# Patient Record
Sex: MALE | Race: WHITE | NOT HISPANIC OR LATINO | Employment: UNEMPLOYED | URBAN - NONMETROPOLITAN AREA
[De-identification: names, ages, dates, MRNs, and addresses within clinical notes are randomized per-mention and may not be internally consistent; named-entity substitution may affect disease eponyms.]

---

## 2019-10-23 ENCOUNTER — TRANSCRIBE ORDERS (OUTPATIENT)
Dept: ADMINISTRATIVE | Facility: HOSPITAL | Age: 34
End: 2019-10-23

## 2021-07-19 ENCOUNTER — APPOINTMENT (OUTPATIENT)
Dept: GENERAL RADIOLOGY | Facility: HOSPITAL | Age: 36
End: 2021-07-19

## 2021-07-19 ENCOUNTER — HOSPITAL ENCOUNTER (EMERGENCY)
Facility: HOSPITAL | Age: 36
Discharge: HOME | End: 2021-07-19
Attending: EMERGENCY MEDICINE | Admitting: EMERGENCY MEDICINE

## 2021-07-19 VITALS
TEMPERATURE: 99.2 F | HEART RATE: 89 BPM | WEIGHT: 165 LBS | RESPIRATION RATE: 18 BRPM | HEIGHT: 72 IN | DIASTOLIC BLOOD PRESSURE: 80 MMHG | OXYGEN SATURATION: 99 % | BODY MASS INDEX: 22.35 KG/M2 | SYSTOLIC BLOOD PRESSURE: 119 MMHG

## 2021-07-19 DIAGNOSIS — S81.011A KNEE LACERATION, RIGHT, INITIAL ENCOUNTER: ICD-10-CM

## 2021-07-19 DIAGNOSIS — S99.911A INJURY OF RIGHT ANKLE, INITIAL ENCOUNTER: ICD-10-CM

## 2021-07-19 DIAGNOSIS — S89.91XA INJURY OF RIGHT KNEE, INITIAL ENCOUNTER: Primary | ICD-10-CM

## 2021-07-19 DIAGNOSIS — S79.911A INJURY OF RIGHT HIP, INITIAL ENCOUNTER: ICD-10-CM

## 2021-07-19 PROCEDURE — 25010000002 FENTANYL CITRATE (PF) 50 MCG/ML SOLUTION: Performed by: EMERGENCY MEDICINE

## 2021-07-19 PROCEDURE — 73562 X-RAY EXAM OF KNEE 3: CPT

## 2021-07-19 PROCEDURE — 25010000002 MORPHINE PER 10 MG: Performed by: EMERGENCY MEDICINE

## 2021-07-19 PROCEDURE — 99284 EMERGENCY DEPT VISIT MOD MDM: CPT

## 2021-07-19 PROCEDURE — 99283 EMERGENCY DEPT VISIT LOW MDM: CPT

## 2021-07-19 PROCEDURE — 96374 THER/PROPH/DIAG INJ IV PUSH: CPT

## 2021-07-19 PROCEDURE — 25010000003 LIDOCAINE 1 % SOLUTION: Performed by: EMERGENCY MEDICINE

## 2021-07-19 PROCEDURE — 73610 X-RAY EXAM OF ANKLE: CPT

## 2021-07-19 PROCEDURE — 96375 TX/PRO/DX INJ NEW DRUG ADDON: CPT

## 2021-07-19 PROCEDURE — 73502 X-RAY EXAM HIP UNI 2-3 VIEWS: CPT

## 2021-07-19 RX ORDER — FENTANYL CITRATE 50 UG/ML
75 INJECTION, SOLUTION INTRAMUSCULAR; INTRAVENOUS
Status: DISCONTINUED | OUTPATIENT
Start: 2021-07-19 | End: 2021-07-19 | Stop reason: HOSPADM

## 2021-07-19 RX ORDER — OXYCODONE HYDROCHLORIDE AND ACETAMINOPHEN 5; 325 MG/1; MG/1
1 TABLET ORAL EVERY 4 HOURS PRN
Qty: 12 TABLET | Refills: 0 | Status: SHIPPED | OUTPATIENT
Start: 2021-07-19

## 2021-07-19 RX ORDER — IBUPROFEN 200 MG
1 TABLET ORAL ONCE
Status: COMPLETED | OUTPATIENT
Start: 2021-07-19 | End: 2021-07-19

## 2021-07-19 RX ORDER — OXYCODONE HYDROCHLORIDE AND ACETAMINOPHEN 5; 325 MG/1; MG/1
1 TABLET ORAL ONCE
Status: COMPLETED | OUTPATIENT
Start: 2021-07-19 | End: 2021-07-19

## 2021-07-19 RX ORDER — LIDOCAINE HYDROCHLORIDE 10 MG/ML
10 INJECTION, SOLUTION INFILTRATION; PERINEURAL ONCE
Status: COMPLETED | OUTPATIENT
Start: 2021-07-19 | End: 2021-07-19

## 2021-07-19 RX ORDER — MORPHINE SULFATE 4 MG/ML
4 INJECTION, SOLUTION INTRAMUSCULAR; INTRAVENOUS ONCE
Status: COMPLETED | OUTPATIENT
Start: 2021-07-19 | End: 2021-07-19

## 2021-07-19 RX ADMIN — OXYCODONE HYDROCHLORIDE AND ACETAMINOPHEN 1 TABLET: 5; 325 TABLET ORAL at 17:39

## 2021-07-19 RX ADMIN — MORPHINE SULFATE 4 MG: 4 INJECTION, SOLUTION INTRAMUSCULAR; INTRAVENOUS at 15:34

## 2021-07-19 RX ADMIN — FENTANYL CITRATE 75 MCG: 50 INJECTION, SOLUTION INTRAMUSCULAR; INTRAVENOUS at 16:23

## 2021-07-19 RX ADMIN — Medication 1 APPLICATION: at 17:40

## 2021-07-19 RX ADMIN — LIDOCAINE HYDROCHLORIDE 10 ML: 10 INJECTION, SOLUTION INFILTRATION; PERINEURAL at 17:00

## 2021-07-19 NOTE — DISCHARGE INSTRUCTIONS
Sutures out in 12 to 14 days.  This can be done here in the ER or at orthopedics or family doctor's office.
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
## 2023-05-01 ENCOUNTER — OFFICE VISIT (OUTPATIENT)
Dept: PSYCHIATRY | Facility: CLINIC | Age: 38
End: 2023-05-01
Payer: MEDICAID

## 2023-05-01 DIAGNOSIS — F11.20 OPIOID USE DISORDER, SEVERE, DEPENDENCE: Primary | ICD-10-CM

## 2023-05-01 NOTE — PROGRESS NOTES
IOP Initial Assessment   Assessment completed on 5/1/2023.  Date: 05/01/2023  Name: Taye Ahmadi    PCP: Unknown to clinician.   Referred by: Self.     Identifying Information:  Assessment completed on 5/1/2023.     Taye Ahmadi, is a 37 y.o. male presents for an initial IOP assessment with Ta Ambrose LCSW at [facility].     Patient reports that he currently lives in [facility]. Patient reports that he lives with his wife and his 4 children (ages 11, 9, 7, and 3). Patient reports that he just started a new job at [employer] which will be full time. Patient reports that he has license. Patient reports that he has transportation. Patient reports motivation for treatment is “get better for myself and kids”. Patient reports that his sober supports are his wife, mother, and father. Patient is seeking IOP treatment on his own accord.     History Present Illness, Onset, Duration, Course:   Patient during assessment discussed substance use history. Patient reports history of nicotine use. Patient reports age at first use was age 19. Patient reports he smokes 0.5 packs per day. Patient reports last nicotine use was today.     Patient reports history of alcohol use. Patient reports age at first use was age 19. Patient reports he would drink 1-2 times per month. Patient reports last alcohol use was last year.     Patient reports history of pain pill use. Patient reports he would use Percocet 10 mg. Patient reports age at first use was age 22. Patient reports he broke [employer] and was prescribed initially, but then began buying off the street. Patient reports he used daily for 15 years. Patient reports he would use 10-15 pills per day. Patient reports last pain pill use was March 17th.     Patient denied heroin, methamphetamine, methadone, cocaine, benzo, marijuana, or suboxone use.     Previous Psychiatric History:    History of prior treatment or hospitalization: Patient reports that he just  completed rehab program at [facility] on April 14th. Patient reports that this was his first rehab. Patient reports that he does not have a therapist. Patient reports he is not on any medications. Patient denied history of inpatient mental health hospitalizations.     History of use of psychotropics: Patient reports not on any medications currently.     History of suicide attempts: Patient during assessment denied current suicidal thoughts. Patient during assessment denied current plan or intent to hurt self. Patient during assessment denied current death wishes. Patient during assessment denied history of suicidal thoughts or plan or intent to hurt self. Patient during assessment denied history of suicide attempts or self-harm.     History of violence: Patient during assessment denied current or history of homicidal thoughts or plan or intent to hurt others. Patient during assessment denied history of violence.     Personal Assessment: 1-10 Scale (10 worst)    Anxiety:  2.    Depression:  1.      Suicidal Ideation: Patient during assessment denied current suicidal thoughts. Patient during assessment denied current plan or intent to hurt self. Patient during assessment denied current death wishes. Patient during assessment denied history of suicidal thoughts or plan or intent to hurt self. Patient during assessment denied history of suicide attempts or self-harm.     Patient during assessment denied current or history of homicidal thoughts or plan or intent to hurt others. Patient during assessment denied history of violence.     Patient during assessment denied history of hallucinations.       Family Psychiatric History:  Family history is significant for psychiatric issues: Patient denied.     Family history is significant for substance abuse issues:  Patient reports grandfather used alcohol and cousins used substances.     Life Events/Trauma History: Patient during assessment denied history of trauma or abuse.  "    Medical History:   I have reviewed this patient's past medical history.    Are there any significant health issues (current or past): In discussing medical history, patient reports no medical history other than having ADHD as a child.     History of seizures: Patient denied history of seizures.     No family history on file.    Current Medications:   Current Outpatient Medications   Medication Sig Dispense Refill   • nicotine (NICODERM CQ) 14 MG/24HR patch Place 1 patch on the skin as directed by provider Daily. 30 patch 2     No current facility-administered medications for this visit.       Relational History:  Difficulty getting along with peers: Patient reports \"not really\".   Difficulty making new friendships: Patient reports \"not really\".   Difficulty maintaining friendships: no  Close with family members: yes    Legal History:  Patient during assessment denied legal history.     Substance Abuse History: Patient during assessment discussed substance use history. Patient reports history of nicotine use. Patient reports age at first use was age 19. Patient reports he smokes 0.5 packs per day. Patient reports last nicotine use was today.     Patient reports history of alcohol use. Patient reports age at first use was age 19. Patient reports he would drink 1-2 times per month. Patient reports last alcohol use was last year.     Patient reports history of pain pill use. Patient reports he would use Percocet 10 mg. Patient reports age at first use was age 22. Patient reports he broke [employer] and was prescribed initially, but then began buying off the street. Patient reports he used daily for 15 years. Patient reports he would use 10-15 pills per day. Patient reports last pain pill use was March 17th.     Patient denied heroin, methamphetamine, methadone, cocaine, benzo, marijuana, or suboxone use.     History of DT's: Unknown to clinician.                       History of Seizures: Patient denied history of " seizures.     Withdrawal Symptoms: Patient reported historically having upset stomach and a little pain.       Cravings: Patient during assessment rated cravings as a 1 on a 1:10 scale (1-low).     Urine drug screen today was presumptively positive for: N/A.     Mental Status Exam:   Affect: Appropriate  Cooperation: Cooperative  Delusion: None  Eye Contact: Good  Hallucinations: None  Homicidal: None  Suicidal: None  Cognition: Good  Memory: Intact  Orientation: Person, Place, Time and Situation  Psychomotor Behaviors: Appropriate  Speech: Normal  Though Content: Normal  Thought Progress: Linear  Hopelessness: Patient during assessment rated feelings of hopelessness as a 1 on a 1:10 scale (1-low).   Reliability: fair  Insight: Good  Judgement: Fair  Impulse Control: Fair  Physical/Medical Issues: In discussing medical history, patient reports no medical history other than having ADHD as a child.     Patient resources/assets: Patient reported sober supports, has license, has transportation, and reported just starting a new job.     ASAM Dimensions:  I.    Intoxication/Withdrawal:  1  (Patient reported last substance use was on March 17th. Patient reports historic withdrawal symptoms included upset stomach and a little pain).  II.   Medical Conditions/Complications:  1 (Patient reports ADHD as a child but denied other medical conditions).  III.  Behavioral/Emotional/Cognitive: 1 (Patient during assessment reported anxiety was a 2, depression was a 1, and feelings of hopelessness as a 1 on a 1:10 scale (1-low). Patient reported having ADHD as a child).  IV.  Readiness to Change: 1 (Patient identified motivation for treatment).  V.   Relapse Risk: 2 (Due to patient reported substance use history).  VI:  Recovery Environment: 0 (Patient reported he has license, transportation, and identified sober supports).  Total ASAM Score = 06     Baseline Scores: 05/01/2023  CHECO-7   (03)                  PHQ-9   (02)        Impression/Formulation: Substance use disorder diagnostic criteria verbally reviewed with patient during assessment for opioid and alcohol use. Based on patient self-report during assessment, patient met 11 of 11 criteria for opioid use and 0 of 11 criteria for alcohol use. Therefore, diagnosis of opioid use disorder, severe, dependence listed.       DSM 5 Substance Use Disorder Checklist     Diagnostic Criteria   (Substance use disorder requires at least 2 criteria be met within 12 month period)   Meets Criteria          Yes / No  Notes/Supporting Information    1. Substance often taken in larger amounts or over a longer period than intended.  yes Opioid use.   2.  There is a persistent desire or unsuccessful effort to cut        down or control th substance use.  yes Opioid use.   3.  A great deal of time is spent in activities necessary to        obtain the substance, use the substance, or recover        from its effects.  yes Opioid use.   4.  Craving or a strong desire to use the substance.  yes Opioid use.   5.  Recurrent substance use resulting in failure to fulfill        major role obligations at work, school, or home.  yes Opioid use.   6.  Continued substance use despite having persistent or         recurrent social or interpersonal problems caused or         exacerbated by the effects of the substance.  yes Opioid use.   7.   Important social, occupational or recreational activities        are given up or reduced because of substance use.  yes Opioid use.   8.   Recurrent substance use in situations in which it is        physically hazardous.  yes Opioid use.   9.  Continued use despite knowledge of having a         persistent or recurrent physical or psychological         problem that is likely to have been caused or        exacerbated by the substance.  yes Opioid use.   10. * Tolerance, as defined by either of the following:          a. A need for markedly increased amounts of the               Substance to achieve intoxication or desired effect.          b. Markedly diminished effect with continued use of              The same substance.  yes Opioid use.   11.  * Withdrawal, as manifested by either of the following:         a. The characteristic withdrawal for the substance.          b. The same (or closely related) substance is taken to               Relieve or avoid withdrawal symptoms.  yes Opioid use.   **This criterion is not considered to be met for those individuals taking prescriptions opiates solely under medical supervision. **   Severity: Mild: 2-3 symptoms, Moderate: 4-5 symptoms, Severe: 6 or more symptoms.        VISIT DIAGNOSIS:     ICD-10-CM ICD-9-CM   1. Opioid use disorder, severe, dependence  F11.20 304.00        Patient appeared alert and oriented.     Plan:  Confidentiality and reporting requirements verbally explained during assessment and patient verbally agreed.     Safety plan of report to police or hospital, or call 911 if feeling unsafe, if having suicidal or homicidal thoughts, or if in emergency need of medications verbally reviewed with patient during assessment and suicide prevention hotline number verbally provided to patient during assessment, patient during assessment verbally agreed to safety plan. Patient signed safety plan which has been scanned into chart.     Patient agreed to CD-IOP start date of Wednesday 5/3/2023.    Ta Ambrose LCSW  5/3/2023  08:15 EDT

## 2023-05-02 ENCOUNTER — OFFICE VISIT (OUTPATIENT)
Dept: PSYCHIATRY | Facility: CLINIC | Age: 38
End: 2023-05-02
Payer: MEDICAID

## 2023-05-02 ENCOUNTER — LAB (OUTPATIENT)
Dept: LAB | Facility: HOSPITAL | Age: 38
End: 2023-05-02
Payer: MEDICAID

## 2023-05-02 VITALS
WEIGHT: 169 LBS | DIASTOLIC BLOOD PRESSURE: 80 MMHG | HEIGHT: 72 IN | SYSTOLIC BLOOD PRESSURE: 122 MMHG | HEART RATE: 73 BPM | BODY MASS INDEX: 22.89 KG/M2

## 2023-05-02 DIAGNOSIS — F17.210 CIGARETTE NICOTINE DEPENDENCE WITHOUT COMPLICATION: ICD-10-CM

## 2023-05-02 DIAGNOSIS — F11.20 OPIOID USE DISORDER, SEVERE, DEPENDENCE: ICD-10-CM

## 2023-05-02 DIAGNOSIS — F11.20 OPIOID USE DISORDER, SEVERE, DEPENDENCE: Primary | ICD-10-CM

## 2023-05-02 PROBLEM — G89.4 CHRONIC PAIN DISORDER: Status: ACTIVE | Noted: 2021-03-26

## 2023-05-02 PROBLEM — M46.1 INFLAMMATION OF SACROILIAC JOINT: Status: ACTIVE | Noted: 2021-03-26

## 2023-05-02 PROBLEM — M54.16 LUMBAR RADICULOPATHY: Status: ACTIVE | Noted: 2021-03-26

## 2023-05-02 LAB
AMPHET+METHAMPHET UR QL: NEGATIVE
AMPHETAMINES UR QL: NEGATIVE
BARBITURATES UR QL SCN: NEGATIVE
BENZODIAZ UR QL SCN: NEGATIVE
BUPRENORPHINE SERPL-MCNC: NEGATIVE NG/ML
CANNABINOIDS SERPL QL: NEGATIVE
COCAINE UR QL: NEGATIVE
METHADONE UR QL SCN: NEGATIVE
OPIATES UR QL: NEGATIVE
OXYCODONE UR QL SCN: NEGATIVE
PCP UR QL SCN: NEGATIVE
PROPOXYPH UR QL: NEGATIVE
TRICYCLICS UR QL SCN: NEGATIVE

## 2023-05-02 PROCEDURE — 80306 DRUG TEST PRSMV INSTRMNT: CPT

## 2023-05-02 PROCEDURE — 90792 PSYCH DIAG EVAL W/MED SRVCS: CPT | Performed by: NURSE PRACTITIONER

## 2023-05-02 PROCEDURE — 1160F RVW MEDS BY RX/DR IN RCRD: CPT | Performed by: NURSE PRACTITIONER

## 2023-05-02 PROCEDURE — 1159F MED LIST DOCD IN RCRD: CPT | Performed by: NURSE PRACTITIONER

## 2023-05-02 RX ORDER — NALOXONE HYDROCHLORIDE 4 MG/.1ML
SPRAY NASAL
COMMUNITY
Start: 2023-03-20 | End: 2023-05-02

## 2023-05-02 RX ORDER — NICOTINE 21 MG/24HR
1 PATCH, TRANSDERMAL 24 HOURS TRANSDERMAL EVERY 24 HOURS
Qty: 30 PATCH | Refills: 2 | Status: SHIPPED | OUTPATIENT
Start: 2023-05-02

## 2023-05-02 NOTE — PROGRESS NOTES
New Patient Office Visit        Patient Name: Taye Ahmadi  : 1985   MRN: [id number]     Referring Provider: Merry Fuentes MD    Chief Complaint: Substance use    History of Present Illness:   Taye Ahmadi is a 37 y.o. male who is here today for initial evaluation with provider related to substance use. Initial substance at age 19 with alcohol and used about 1-2 times per month with last use about a year ago.  At age 22 use of opioid pain medication began. Started with rx for broken collarbone and began buying off the street when rx ended. Used multiple times daily for about 15 year with last use 3/17/2023. Denies cravings or recent triggering events. Previous [facility] treatment includes recent residential stay at [facility] and participated in [facility] Device study.  Continues to meet with them for weekly drug screens.  Completed program 2023.  Reviewed DSM-V criteria with patient and meets requirements for opioid use disorder, severe.     Currently lives in [facility] with with his wife and his 4 children (ages 11, 9, 7, and 3). Identifies sober support system of in-laws, wife, mother, and father. Currently employed full-time by [employer]. License is active and has a vehicle.  Denies legal issues related to use.  Has psych history of ADHD diagnosed as a child.  Feels he is doing well without any pharmacological intervention.  Has some situational anxiety due to daily stressors but feels he malik well overall.  Uses meditation often. Denies prior psychiatric hospitalizations. Denies SI/HI, AVH. +FH of [facility] in uncle.  Denies significant past medical history.  Recently tested for HIV and hep C and believes these test were negative. Currently has a PCP ([facility]).    Triggers: Denies recent triggering events    Cravings: Denies    Relapse Prevention: IOP, peer support, recovery meetings 2 to 3 days a week encouraged    Urine Drug Screen (today's visit) discussed: ordered    UDS  Confirmation: n/a    KRUPA (PDMP) Reviewed for Current/Active Medications: No active medications (KRUPA [id number])    Past Surgical History:  Past Surgical History:   Procedure Laterality Date   • APPENDECTOMY     • KNEE ARTHROSCOPY W/ ACL RECONSTRUCTION Right    • KNEE SURGERY Right        Problem List:  Patient Active Problem List   Diagnosis   • Lumbar radiculopathy   • Inflammation of sacroiliac joint   • Chronic pain disorder       Allergy:   Allergies   Allergen Reactions   • Ibuprofen GI Bleeding        Current Medications:   Current Outpatient Medications   Medication Sig Dispense Refill   • nicotine (NICODERM CQ) 14 MG/24HR patch Place 1 patch on the skin as directed by provider Daily. 30 patch 2     No current facility-administered medications for this visit.       Past Medical History:  History reviewed. No pertinent past medical history.    Social History:  Social History     Socioeconomic History   • Marital status:    Tobacco Use   • Smoking status: Every Day     Packs/day: 0.50     Types: Cigarettes   • Smokeless tobacco: Never   Vaping Use   • Vaping Use: Never used   Substance and Sexual Activity   • Alcohol use: Not Currently   • Drug use: Not Currently   • Sexual activity: Defer       Family History:  History reviewed. No pertinent family history.      Subjective      Review of Systems:   Review of Systems   Constitutional: Negative for chills, fatigue and fever.   Respiratory: Negative for shortness of breath.    Cardiovascular: Negative for chest pain.   Gastrointestinal: Negative for abdominal pain.   Skin: Negative for skin lesions.   Neurological: Negative for seizures and confusion.   Psychiatric/Behavioral: Positive for stress. Negative for hallucinations, sleep disturbance, suicidal ideas and depressed mood. The patient is not nervous/anxious.        PHQ-9 Depression Screening  Little interest or pleasure in doing things? 0-->not at all   Feeling down, depressed, or hopeless?  0-->not at all   Trouble falling or staying asleep, or sleeping too much? 0-->not at all   Feeling tired or having little energy? 0-->not at all   Poor appetite or overeating? 1-->several days   Feeling bad about yourself - or that you are a failure or have let yourself or your family down? 1-->several days   Trouble concentrating on things, such as reading the newspaper or watching television? 0-->not at all   Moving or speaking so slowly that other people could have noticed? Or the opposite - being so fidgety or restless that you have been moving around a lot more than usual? 0-->not at all   Thoughts that you would be better off dead, or of hurting yourself in some way? 0-->not at all   PHQ-9 Total Score 2   If you checked off any problems, how difficult have these problems made it for you to do your work, take care of things at home, or get along with other people? somewhat difficult       CHECO-7 Score:   Feeling nervous, anxious or on edge: Several days  Not being able to stop or control worrying: Not at all  Worrying too much about different things: Several days  Trouble Relaxing: Not at all  Being so restless that it is hard to sit still: Several days  Feeling afraid as if something awful might happen: Not at all  Becoming easily annoyed or irritable: Several days  CHECO 7 Total Score: 4  If you checked any problems, how difficult have these problems made it for you to do your work, take care of things at home, or get along with other people: Not difficult at all    Patient History:   The following portions of the patient's history were reviewed and updated as appropriate: allergies, current medications, past family history, past medical history, past social history, past surgical history and problem list.     Social:  Social History     Socioeconomic History   • Marital status:    Tobacco Use   • Smoking status: Every Day     Packs/day: 0.50     Types: Cigarettes   • Smokeless tobacco: Never   Vaping Use  "  • Vaping Use: Never used   Substance and Sexual Activity   • Alcohol use: Not Currently   • Drug use: Not Currently   • Sexual activity: Defer       Medications:     Current Outpatient Medications:   •  nicotine (NICODERM CQ) 14 MG/24HR patch, Place 1 patch on the skin as directed by provider Daily., Disp: 30 patch, Rfl: 2    Objective     Physical Exam:  Physical Exam  Vitals reviewed.   Constitutional:       General: He is not in acute distress.     Appearance: He is well-developed. He is not ill-appearing.   Pulmonary:      Effort: No respiratory distress.   Neurological:      Mental Status: He is alert and oriented to person, place, and time.      Gait: Gait normal.   Psychiatric:         Attention and Perception: Attention normal.         Mood and Affect: Mood and affect normal.         Speech: Speech normal.         Behavior: Behavior normal. Behavior is cooperative.         Thought Content: Thought content is not paranoid or delusional. Thought content does not include homicidal or suicidal ideation. Thought content does not include homicidal or suicidal plan.         Cognition and Memory: Cognition and memory normal.         Judgment: Judgment normal.         Vital Signs:   Vitals:    05/02/23 1529   BP: 122/80   Pulse: 73   Weight: 76.7 kg (169 lb)   Height: 182.9 cm (72\")     Body mass index is 22.92 kg/m².     Mental Status Exam:   Hygiene:   good  Cooperation:  Cooperative  Eye Contact:  Good  Psychomotor Behavior:  Appropriate  Affect:  Full range  Mood: normal  Speech:  Normal  Thought Process:  Goal directed  Thought Content:  Normal  Suicidal:  None  Homicidal:  None  Hallucinations:  None  Delusion:  None  Memory:  Intact  Orientation:  Person, Place, Time and Situation  Reliability:  good  Insight:  Good  Judgement:  Fair  Impulse Control:  Fair    Assessment / Plan      -IOP screener completed and is agreeable to start tomorrow.  Will go to for UDS today.  -Advisement to take part in and remain " active in 12 Step Recovery Meetings, IOP, and/or 1:1 therapy/counseling and to establish/maintain an active relationship with a recovery sponsor.   -Continued monitoring for illicit substances for patient safety, medication compliance and future care.  -Narcan sample given to patient and OD education previously provided  -Discussed services available through behavioral health should he need them in the future  -Start nicotine 14 mg per 24-hour patch.  Do not smoke with patch on.    Assessment & Plan   Problems Addressed this Visit    None  Visit Diagnoses     Opioid use disorder, severe, dependence    -  Primary    Relevant Orders    Urine Drug Screen - Urine, Clean Catch    [facility] Tox - Urine, Clean Catch    Cigarette nicotine dependence without complication        Relevant Medications    nicotine (NICODERM CQ) 14 MG/24HR patch      Diagnoses       Codes Comments    Opioid use disorder, severe, dependence    -  Primary ICD-10-CM: F11.20  ICD-9-CM: 304.00     Cigarette nicotine dependence without complication     ICD-10-CM: F17.210  ICD-9-CM: 305.1           Visit Diagnoses:    ICD-10-CM ICD-9-CM   1. Opioid use disorder, severe, dependence  F11.20 304.00   2. Cigarette nicotine dependence without complication  F17.210 305.1       PLAN:  1. Safety: No acute safety concerns  2. Risk Assessment: Risk of self-harm acutely is low. Risk of self-harm chronically is also low, but could be further elevated in the event of treatment noncompliance and/or AODA.    TREATMENT PLAN: Continue supportive psychotherapy efforts and medications as indicated. Treatment and medication options discussed during today's visit. Patient acknowledged and verbally consented to continue with current treatment plan and was educated on the importance of compliance with treatment and follow-up appointments.    GOALS:  Short Term Goals: Patient will be compliant with medication, and patient will have no significant  medication related side effects.  Patient will be engaged in psychotherapy as indicated.  Patient will report subjective improvement of symptoms.  Long term goals: To stabilize mood and treat/improve subjective symptoms, the patient will stay out of the hospital, the patient will be at an optimal level of functioning, and the patient will take all medications as prescribed.  The patient/guardian verbalized understanding and agreement with goals that were mutually set.    MEDICATION ISSUES:  KRUPA reviewed as expected.  Discussed medication options and treatment plan of prescribed medication as well as the risks, benefits, and side effects including potential falls, possible impaired driving and metabolic adversities among others. Patient is agreeable to call the office with any worsening of symptoms or onset of side effects. Patient is agreeable to call 911 or go to the nearest ER should he/she begin having SI/HI. No medication side effects or related complaints today.       TOBACCO USE:  Current every day smoker less than 3 minutes spent counseling Will try to cut down    I advised Taye Ahmadi of the risks of tobacco use.     MEDS ORDERED DURING VISIT:  New Medications Ordered This Visit   Medications   • nicotine (NICODERM CQ) 14 MG/24HR patch     Sig: Place 1 patch on the skin as directed by provider Daily.     Dispense:  30 patch     Refill:  2       Return if symptoms worsen or fail to improve.           This document has been electronically signed by ELLIS Mane  May 2, 2023 15:55 EDT      Part of this note may be an electronic transcription/translation of spoken language to printed text using the Dragon Dictation System.

## 2023-05-03 ENCOUNTER — OFFICE VISIT (OUTPATIENT)
Dept: PSYCHIATRY | Facility: HOSPITAL | Age: 38
End: 2023-05-03
Payer: MEDICAID

## 2023-05-03 DIAGNOSIS — F11.20 OPIOID USE DISORDER, SEVERE, DEPENDENCE: Primary | ICD-10-CM

## 2023-05-03 PROCEDURE — H0015 ALCOHOL AND/OR DRUG SERVICES: HCPCS | Performed by: NURSE PRACTITIONER

## 2023-05-04 ENCOUNTER — OFFICE VISIT (OUTPATIENT)
Dept: PSYCHIATRY | Facility: HOSPITAL | Age: 38
End: 2023-05-04
Payer: MEDICAID

## 2023-05-04 DIAGNOSIS — F11.20 OPIOID USE DISORDER, SEVERE, DEPENDENCE: Primary | ICD-10-CM

## 2023-05-04 PROCEDURE — H0015 ALCOHOL AND/OR DRUG SERVICES: HCPCS | Performed by: NURSE PRACTITIONER

## 2023-05-08 ENCOUNTER — OFFICE VISIT (OUTPATIENT)
Dept: PSYCHIATRY | Facility: HOSPITAL | Age: 38
End: 2023-05-08
Payer: MEDICAID

## 2023-05-08 DIAGNOSIS — F11.20 OPIOID USE DISORDER, SEVERE, DEPENDENCE: Primary | ICD-10-CM

## 2023-05-08 LAB
1OH-MIDAZOLAM UR CFM-MCNC: NEGATIVE NG/ML
6MAM UR CFM-MCNC: NEGATIVE NG/ML
7AMINOCLONAZEPAM UR CFM-MCNC: NEGATIVE NG/ML
7AMINOCLONAZEPAM UR CFM-MCNC: NEGATIVE NG/ML
A-OH ALPRAZ UR CFM-MCNC: NEGATIVE NG/ML
ALPRAZ UR CFM-MCNC: NEGATIVE NG/ML
AMOBARBITAL UR CFM-MCNC: NEGATIVE NG/ML
AMPHET UR CFM-MCNC: NEGATIVE NG/ML
BUPRENORPHINE UR-MCNC: NEGATIVE NG/ML
BUTABARBITAL UR CFM-MCNC: NEGATIVE NG/ML
BUTALBITAL UR CFM-MCNC: NEGATIVE NG/ML
BZE UR CFM-MCNC: NEGATIVE NG/ML
CARBOXYTHC UR CFM-MCNC: NEGATIVE NG/ML
CARISOPRODOL UR CFM-MCNC: NEGATIVE NG/ML
CODEINE UR CFM-MCNC: NEGATIVE NG/ML
CREATININE: 185.7 MG/DL
DIAZEPAM UR CFM-MCNC: NEGATIVE NG/ML
EDDP UR CFM-MCNC: NEGATIVE NG/ML
ETHYL GLUCURONIDE UR CFM-MCNC: NEGATIVE NG/ML
FENTANYL UR-MCNC: NEGATIVE NG/ML
GABAPENTIN UR CFM-MCNC: NEGATIVE NG/ML
HYDROCODONE UR CFM-MCNC: NEGATIVE NG/ML
HYDROMORPHONE UR CFM-MCNC: NEGATIVE NG/ML
LORAZEPAM UR CFM-MCNC: NEGATIVE NG/ML
MDMA UR CFM-MCNC: NEGATIVE NG/ML
ME-PHENIDATE UR CFM-MCNC: NEGATIVE NG/ML
METHADONE UR CFM-MCNC: NEGATIVE NG/ML
METHAMPHET UR CFM-MCNC: NEGATIVE NG/ML
MIDAZOLAM UR CFM-MCNC: NEGATIVE NG/ML
MORPHINE UR CFM-MCNC: NEGATIVE NG/ML
NORBUPRENORPHINE UR CFM-MCNC: NEGATIVE NG/ML
NORDIAZEPAM UR CFM-MCNC: NEGATIVE NG/ML
NORFENTANYL UR CFM-MCNC: NEGATIVE NG/ML
NORHYDROCODONE UR CFM-MCNC: NEGATIVE NG/ML
NOROXYCODONE UR CFM-MCNC: NEGATIVE NG/ML
OXAZEPAM CTO UR CFM-MCNC: NEGATIVE NG/ML
OXYCODONE SERPL-MCNC: NEGATIVE NG/ML
OXYMORPHONE SERPL-MCNC: NEGATIVE NG/ML
PCP UR CFM-MCNC: NEGATIVE NG/ML
PH: 8.8 (ref 4.5–9)
PHENOBARB UR CFM-MCNC: NEGATIVE NG/ML
PHENTERMINE: NEGATIVE NG/ML
PPAA UR CFM-MCNC: NEGATIVE NG/ML
PREGABALIN UR CFM-MCNC: NEGATIVE NG/ML
SECOBARBITAL UR CFM-MCNC: NEGATIVE NG/ML
SPECIFIC GRAVITY: 1.02 (ref 1–1.03)
TAPENTADOL UR CFM-MCNC: NEGATIVE NG/ML
TEMAZEPAM UR CFM-MCNC: NEGATIVE NG/ML
TRAMADOL: NEGATIVE NG/ML
ZOLPIDEM PHENYL-4-CARB UR CFM-MCNC: NEGATIVE NG/ML
ZOLPIDEM UR CFM-MCNC: NEGATIVE NG/ML

## 2023-05-08 PROCEDURE — H0015 ALCOHOL AND/OR DRUG SERVICES: HCPCS | Performed by: NURSE PRACTITIONER

## 2023-05-09 ENCOUNTER — LAB (OUTPATIENT)
Dept: LAB | Facility: HOSPITAL | Age: 38
End: 2023-05-09
Payer: MEDICAID

## 2023-05-09 DIAGNOSIS — F11.20 OPIOID USE DISORDER, SEVERE, DEPENDENCE: ICD-10-CM

## 2023-05-09 PROCEDURE — 80306 DRUG TEST PRSMV INSTRMNT: CPT

## 2023-05-10 ENCOUNTER — OFFICE VISIT (OUTPATIENT)
Dept: PSYCHIATRY | Facility: HOSPITAL | Age: 38
End: 2023-05-10
Payer: MEDICAID

## 2023-05-10 DIAGNOSIS — F11.20 OPIOID USE DISORDER, SEVERE, DEPENDENCE: Primary | ICD-10-CM

## 2023-05-10 PROCEDURE — H0015 ALCOHOL AND/OR DRUG SERVICES: HCPCS | Performed by: NURSE PRACTITIONER

## 2023-05-11 ENCOUNTER — OFFICE VISIT (OUTPATIENT)
Dept: PSYCHIATRY | Facility: HOSPITAL | Age: 38
End: 2023-05-11
Payer: MEDICAID

## 2023-05-11 DIAGNOSIS — F11.20 OPIOID USE DISORDER, SEVERE, DEPENDENCE: Primary | ICD-10-CM

## 2023-05-11 PROCEDURE — H0015 ALCOHOL AND/OR DRUG SERVICES: HCPCS | Performed by: NURSE PRACTITIONER

## 2023-05-15 ENCOUNTER — OFFICE VISIT (OUTPATIENT)
Dept: PSYCHIATRY | Facility: HOSPITAL | Age: 38
End: 2023-05-15
Payer: MEDICAID

## 2023-05-15 DIAGNOSIS — F11.20 OPIOID USE DISORDER, SEVERE, DEPENDENCE: Primary | ICD-10-CM

## 2023-05-15 LAB
1OH-MIDAZOLAM UR CFM-MCNC: NEGATIVE NG/ML
6MAM UR CFM-MCNC: NEGATIVE NG/ML
7AMINOCLONAZEPAM UR CFM-MCNC: NEGATIVE NG/ML
7AMINOCLONAZEPAM UR CFM-MCNC: NEGATIVE NG/ML
A-OH ALPRAZ UR CFM-MCNC: NEGATIVE NG/ML
ALPRAZ UR CFM-MCNC: NEGATIVE NG/ML
AMOBARBITAL UR CFM-MCNC: NEGATIVE NG/ML
AMPHET UR CFM-MCNC: NEGATIVE NG/ML
BUPRENORPHINE UR-MCNC: NEGATIVE NG/ML
BUTABARBITAL UR CFM-MCNC: NEGATIVE NG/ML
BUTALBITAL UR CFM-MCNC: NEGATIVE NG/ML
BZE UR CFM-MCNC: NEGATIVE NG/ML
CARBOXYTHC UR CFM-MCNC: NEGATIVE NG/ML
CARISOPRODOL UR CFM-MCNC: NEGATIVE NG/ML
CODEINE UR CFM-MCNC: NEGATIVE NG/ML
CREATININE: 21.7 MG/DL
DIAZEPAM UR CFM-MCNC: NEGATIVE NG/ML
EDDP UR CFM-MCNC: NEGATIVE NG/ML
ETHYL GLUCURONIDE UR CFM-MCNC: NEGATIVE NG/ML
FENTANYL UR-MCNC: NEGATIVE NG/ML
GABAPENTIN UR CFM-MCNC: NEGATIVE NG/ML
HYDROCODONE UR CFM-MCNC: NEGATIVE NG/ML
HYDROMORPHONE UR CFM-MCNC: NEGATIVE NG/ML
LORAZEPAM UR CFM-MCNC: NEGATIVE NG/ML
MDMA UR CFM-MCNC: NEGATIVE NG/ML
ME-PHENIDATE UR CFM-MCNC: NEGATIVE NG/ML
METHADONE UR CFM-MCNC: NEGATIVE NG/ML
METHAMPHET UR CFM-MCNC: NEGATIVE NG/ML
MIDAZOLAM UR CFM-MCNC: NEGATIVE NG/ML
MORPHINE UR CFM-MCNC: NEGATIVE NG/ML
NORBUPRENORPHINE UR CFM-MCNC: NEGATIVE NG/ML
NORDIAZEPAM UR CFM-MCNC: NEGATIVE NG/ML
NORFENTANYL UR CFM-MCNC: NEGATIVE NG/ML
NORHYDROCODONE UR CFM-MCNC: NEGATIVE NG/ML
NOROXYCODONE UR CFM-MCNC: NEGATIVE NG/ML
OXAZEPAM CTO UR CFM-MCNC: NEGATIVE NG/ML
OXYCODONE SERPL-MCNC: NEGATIVE NG/ML
OXYMORPHONE SERPL-MCNC: NEGATIVE NG/ML
PCP UR CFM-MCNC: NEGATIVE NG/ML
PH: 8.3 (ref 4.5–9)
PHENOBARB UR CFM-MCNC: NEGATIVE NG/ML
PHENTERMINE: NEGATIVE NG/ML
PPAA UR CFM-MCNC: NEGATIVE NG/ML
PREGABALIN UR CFM-MCNC: NEGATIVE NG/ML
SECOBARBITAL UR CFM-MCNC: NEGATIVE NG/ML
SPECIFIC GRAVITY: 1 (ref 1–1.03)
TAPENTADOL UR CFM-MCNC: NEGATIVE NG/ML
TEMAZEPAM UR CFM-MCNC: NEGATIVE NG/ML
TRAMADOL: NEGATIVE NG/ML
ZOLPIDEM PHENYL-4-CARB UR CFM-MCNC: NEGATIVE NG/ML
ZOLPIDEM UR CFM-MCNC: NEGATIVE NG/ML

## 2023-05-15 PROCEDURE — H0015 ALCOHOL AND/OR DRUG SERVICES: HCPCS | Performed by: NURSE PRACTITIONER

## 2023-05-16 ENCOUNTER — LAB (OUTPATIENT)
Dept: LAB | Facility: HOSPITAL | Age: 38
End: 2023-05-16
Payer: MEDICAID

## 2023-05-16 DIAGNOSIS — F11.20 OPIOID USE DISORDER, SEVERE, DEPENDENCE: ICD-10-CM

## 2023-05-16 PROCEDURE — 80306 DRUG TEST PRSMV INSTRMNT: CPT

## 2023-05-17 ENCOUNTER — OFFICE VISIT (OUTPATIENT)
Dept: PSYCHIATRY | Facility: HOSPITAL | Age: 38
End: 2023-05-17
Payer: MEDICAID

## 2023-05-17 DIAGNOSIS — F11.20 OPIOID USE DISORDER, SEVERE, DEPENDENCE: Primary | ICD-10-CM

## 2023-05-17 PROCEDURE — H0015 ALCOHOL AND/OR DRUG SERVICES: HCPCS | Performed by: NURSE PRACTITIONER

## 2023-05-17 NOTE — PROGRESS NOTES
"[facility] IOP GROUP     Date: 05/03/2023  Name: Taye Ahmadi    Time In: 1800   Time Out: 2055     Number of participants: 8.    IOP GROUP NOTE     Data: 3 hour IOP group therapy session (Check-ins, Coping Skills, Relapse Prevention)     Check Ins: Therapist continued facilitation of rapport building strategies between group members. Therapist asked that each patient check in with home life and recovery efforts and identify triggers, cravings, and high risk situations that arise between group sessions. Therapist provided empathy and support during group session.     Session Content/Coping Skills: Darian from pharmacy presented on Narcan education. Check ins completed by group members. Living in Balance- Session 1 finished. YouTube video “Neurobiology of Addiction, Addiction 101 in [place]” viewed and discussed.      Response: Patient attended class in person. Patient participated in completion of check in form. Patient on check in form answered no to question \"currently or in the past 7 days, have you had any suicidal thoughts or plan or intent to hurt yourself”, and patient also answered no on check in form to question of \"currently or in the past 7 days, have you had any homicidal thoughts or plan or intent to hurt others\". Patient on check in form reported that he had utilized new supports in the past 7 days. Patient on check in form also reported a meeting with Brianna Spicer.     Personal Assessment 0-10 Scale (0-none, 10-high)    Anxiety:  1   Depression:  0   Cravings: 0     Assessment:     ..  Lab on 05/02/2023   Component Date Value Ref Range Status   • THC, Screen, Urine 05/02/2023 Negative  Negative Final   • Phencyclidine (PCP), Urine 05/02/2023 Negative  Negative Final   • Cocaine Screen, Urine 05/02/2023 Negative  Negative Final   • Methamphetamine, Ur 05/02/2023 Negative  Negative Final   • Opiate Screen 05/02/2023 Negative  Negative Final   • Amphetamine Screen, Urine 05/02/2023 Negative  Negative Final "   • Benzodiazepine Screen, Urine 05/02/2023 Negative  Negative Final   • Tricyclic Antidepressants Screen 05/02/2023 Negative  Negative Final   • Methadone Screen, Urine 05/02/2023 Negative  Negative Final   • Barbiturates Screen, Urine 05/02/2023 Negative  Negative Final   • Oxycodone Screen, Urine 05/02/2023 Negative  Negative Final   • Propoxyphene Screen 05/02/2023 Negative  Negative Final   • Buprenorphine, Screen, Urine 05/02/2023 Negative  Negative Final   • CODEINE 05/02/2023 Negative  50 ng/ml ng/ml Final   • HYDROCODONE 05/02/2023 Negative  50 ng/ml ng/ml Final   • NORHYDROCODONE 05/02/2023 Negative  50 ng/ml ng/ml Final   • HYDROMORPHONE 05/02/2023 Negative  50 ng/ml ng/ml Final   • MORPHINE 05/02/2023 Negative  50 ng/ml ng/ml Final   • FENTANYL 05/02/2023 Negative  2 ng/ml ng/ml Final   • NORFENTANYL 05/02/2023 Negative  10 ng/ml ng/ml Final   • METHADONE 05/02/2023 Negative  25 ng/ml ng/ml Final   • EDDP 05/02/2023 Negative  50 ng/ml ng/ml Final   • OXYCODONE 05/02/2023 Negative  50 ng/ml ng/ml Final   • NOROXYCODONE 05/02/2023 Negative  50 ng/ml ng/ml Final   • OXYMORPHONE 05/02/2023 Negative  50 ng/ml ng/ml Final   • TAPENTADOL 05/02/2023 Negative  50 ng/ml ng/ml Final   • TRAMADOL 05/02/2023 Negative  50 ng/ml ng/ml Final   • BUPRENORPHINE 05/02/2023 Negative  7.5 ng/ml ng/ml Final   • NORBUPRENORPHINE 05/02/2023 Negative  10 ng/ml ng/ml Final   • AMOBARBITAL 05/02/2023 Negative  100 ng/ml ng/ml Final   • BUTABARBITAL 05/02/2023 Negative  100 ng/ml ng/ml Final   • BUTALBITAL 05/02/2023 Negative  100 ng/ml ng/ml Final   • PHENOBARBITAL 05/02/2023 Negative  100 ng/ml ng/ml Final   • SECOBARBITAL 05/02/2023 Negative  100 ng/ml ng/ml Final   • ALPRAZOLAM 05/02/2023 Negative  50 ng/ml ng/ml Final   • ALPHA-HYDROXYALPRAZOLAM 05/02/2023 Negative  50 ng/ml ng/ml Final   • CLONAZEPAM 05/02/2023 Negative  50 ng/ml ng/ml Final   • 7- AMINOCLONAZEPAM 05/02/2023 Negative  50 ng/ml ng/ml Final   • DIAZEPAM  05/02/2023 Negative  50 ng/ml ng/ml Final   • NORDIAZEPAM 05/02/2023 Negative  50 ng/ml ng/ml Final   • LORAZEPAM 05/02/2023 Negative  100 ng/ml ng/ml Final   • MIDAZOLAM 05/02/2023 Negative  50 ng/ml ng/ml Final   • ALPHA-HYDROXYMIDAZOLAM 05/02/2023 Negative  50 ng/ml ng/ml Final   • OXAZEPAM 05/02/2023 Negative  50 ng/ml ng/ml Final   • TEMAZEPAM 05/02/2023 Negative  50 ng/ml ng/ml Final   • ETG 05/02/2023 Negative  200 ng/ml ng/ml Final   • BENZOYLECGONINE 05/02/2023 Negative  100 ng/ml ng/ml Final   • 6-JEREMIAS 05/02/2023 Negative  10 ng/ml ng/ml Final   • MDMA 05/02/2023 Negative  100 ng/ml ng/ml Final   • PCP 05/02/2023 Negative  20 ng/ml ng/ml Final   • DELTA-9-THC 05/02/2023 Negative  20 ng/ml ng/ml Final   • AMPHETAMINE 05/02/2023 Negative  250 ng/ml ng/ml Final   • METHAMPHETAMINE 05/02/2023 Negative  100 ng/ml ng/ml Final   • METHYLPHENIDATE 05/02/2023 Negative  10 ng/ml ng/ml Final   • PHENTERMINE 05/02/2023 Negative  100 ng/ml ng/ml Final   • RITALINIC ACID 05/02/2023 Negative  50 ng/ml ng/ml Final   • CARISOPRODOL 05/02/2023 Negative  100 ng/ml ng/ml Final   • GABAPENTIN 05/02/2023 Negative  500 ng/ml ng/ml Final   • PREGABALIN 05/02/2023 Negative  250 ng/ml ng/ml Final   • ZOLPIDEM 05/02/2023 Negative  2 ng/ml ng/ml Final   • CARBOXYZOLPIDEM 05/02/2023 Negative  10 ng/ml ng/ml Final   • PH 05/02/2023 8.8  4.5 - 9 Final   • CREATININE 05/02/2023 185.7  20 - 300 mg/dL mg/dL Final   • SPECIFIC GRAVITY 05/02/2023 1.021  1.003 - 1.035 Final       Mental Status Exam  Hygiene:  good  Dress: casual  Attitude: cooperative and agreeable   Motor Activity: appropriate  Eye Contact:  good  Speech: regular rate and rhythm   Mood:  calm and cooperative  Affect:  Appropriate  Thought Processes:  Linear  Thought Content:  Normal  Suicidal Thoughts:  denies  Homicidal Thoughts:  denies  Crisis Safety Plan: Safety plan has been discussed.   Hallucinations:  Unknown to clinician.   Reliability: fair  Insight:  fair  Judgement: fair  Impulse Control: fair    Recovery/spiritual support group attendance: Patient reported meeting with Brianna Spicer.      Progress toward goal: Not at goal    Prognosis: Fair with Ongoing Treatment     Self-reported number of days sober: Patient reported 47 days on check in form.     Patient will contact this office, call 911 or present to the nearest emergency room should suicidal or homicidal ideations occur.    Impression/Formulation:    ICD-10-CM ICD-9-CM   1. Opioid use disorder, severe, dependence  F11.20 304.00       Clinical Maneuvering/Interventions: Therapist utilized a person-centered approach to build rapport with group member. Therapist implemented motivational interviewing techniques to assist client with exploring and resolving ambivalence associated with commitment to change behaviors related to substance use and addiction. Therapist applied cognitive behavioral strategies to facilitate identification of maladaptive patterns of thinking and behavior that contribute to client's risk for continued substance use and relapse. Therapist employed group interaction activities to build rapport among group members, promote sobriety, and emphasize relapse prevention. Therapist promoted safe nonjudgmental environment by providing group members with unconditional positive regard and encouraging group members to comply with group rules and guidelines. Therapist assisted group member with identifying and implementing healthier coping strategies.      Plan:  Continue [facility] IOP Phase I   Aftercare:  [facility] Phase II  Program Assignments:  Personal recovery plan, relapse prevention plan, attendance of recovery support group meetings, exploration of sponsorship, drug/alcohol screens.     Ta Ambrose LCSW  5/17/2023  14:22 EDT

## 2023-05-17 NOTE — PROGRESS NOTES
"[facility] GROUP     Date: 05/04/2023  Name: Taye Ahmadi    Time In: 1800   Time Out: 2100     Number of participants: 4.    IOP GROUP NOTE     Data: 3 hour IOP group therapy session (Check-ins, Coping Skills, Relapse Prevention)     Check Ins: Therapist continued facilitation of rapport building strategies between group members. Therapist asked that each patient check in with home life and recovery efforts and identify triggers, cravings, and high risk situations that arise between group sessions. Therapist provided empathy and support during group session.     Session Content/Coping Skills: Check ins completed by group members. Individual check ins completed with group members. [place] Social Skills psychoeducational material reviewed and discussed. Clinician discussed with group members passive, aggressive, and assertive communication. [place] group activity completed by group members and communication during activity was discussed.      Response: Patient attended class in person. Patient participated in completion of check in form. Patient on check in form answered no to question \"currently or since last group meeting have you had any suicidal thoughts or plan or intent to hurt yourself”, and patient also answered no on check in form to question of \"currently or since your last group meeting, have you had any homicidal thoughts or plan or intent to hurt others\". Patient on check in form reported that he had utilized new supports since last group meeting.     Personal Assessment 0-10 Scale (0-none, 10-high)    Anxiety:  1   Depression:  0   Cravings: 0     Assessment:     ..  Lab on 05/02/2023   Component Date Value Ref Range Status   • THC, Screen, Urine 05/02/2023 Negative  Negative Final   • Phencyclidine (PCP), Urine 05/02/2023 Negative  Negative Final   • Cocaine Screen, Urine 05/02/2023 Negative  Negative Final   • Methamphetamine, Ur 05/02/2023 Negative  Negative Final   • Opiate Screen 05/02/2023 " Negative  Negative Final   • Amphetamine Screen, Urine 05/02/2023 Negative  Negative Final   • Benzodiazepine Screen, Urine 05/02/2023 Negative  Negative Final   • Tricyclic Antidepressants Screen 05/02/2023 Negative  Negative Final   • Methadone Screen, Urine 05/02/2023 Negative  Negative Final   • Barbiturates Screen, Urine 05/02/2023 Negative  Negative Final   • Oxycodone Screen, Urine 05/02/2023 Negative  Negative Final   • Propoxyphene Screen 05/02/2023 Negative  Negative Final   • Buprenorphine, Screen, Urine 05/02/2023 Negative  Negative Final   • CODEINE 05/02/2023 Negative  50 ng/ml ng/ml Final   • HYDROCODONE 05/02/2023 Negative  50 ng/ml ng/ml Final   • NORHYDROCODONE 05/02/2023 Negative  50 ng/ml ng/ml Final   • HYDROMORPHONE 05/02/2023 Negative  50 ng/ml ng/ml Final   • MORPHINE 05/02/2023 Negative  50 ng/ml ng/ml Final   • FENTANYL 05/02/2023 Negative  2 ng/ml ng/ml Final   • NORFENTANYL 05/02/2023 Negative  10 ng/ml ng/ml Final   • METHADONE 05/02/2023 Negative  25 ng/ml ng/ml Final   • EDDP 05/02/2023 Negative  50 ng/ml ng/ml Final   • OXYCODONE 05/02/2023 Negative  50 ng/ml ng/ml Final   • NOROXYCODONE 05/02/2023 Negative  50 ng/ml ng/ml Final   • OXYMORPHONE 05/02/2023 Negative  50 ng/ml ng/ml Final   • TAPENTADOL 05/02/2023 Negative  50 ng/ml ng/ml Final   • TRAMADOL 05/02/2023 Negative  50 ng/ml ng/ml Final   • BUPRENORPHINE 05/02/2023 Negative  7.5 ng/ml ng/ml Final   • NORBUPRENORPHINE 05/02/2023 Negative  10 ng/ml ng/ml Final   • AMOBARBITAL 05/02/2023 Negative  100 ng/ml ng/ml Final   • BUTABARBITAL 05/02/2023 Negative  100 ng/ml ng/ml Final   • BUTALBITAL 05/02/2023 Negative  100 ng/ml ng/ml Final   • PHENOBARBITAL 05/02/2023 Negative  100 ng/ml ng/ml Final   • SECOBARBITAL 05/02/2023 Negative  100 ng/ml ng/ml Final   • ALPRAZOLAM 05/02/2023 Negative  50 ng/ml ng/ml Final   • ALPHA-HYDROXYALPRAZOLAM 05/02/2023 Negative  50 ng/ml ng/ml Final   • CLONAZEPAM 05/02/2023 Negative  50 ng/ml ng/ml  Final   • 7- AMINOCLONAZEPAM 05/02/2023 Negative  50 ng/ml ng/ml Final   • DIAZEPAM 05/02/2023 Negative  50 ng/ml ng/ml Final   • NORDIAZEPAM 05/02/2023 Negative  50 ng/ml ng/ml Final   • LORAZEPAM 05/02/2023 Negative  100 ng/ml ng/ml Final   • MIDAZOLAM 05/02/2023 Negative  50 ng/ml ng/ml Final   • ALPHA-HYDROXYMIDAZOLAM 05/02/2023 Negative  50 ng/ml ng/ml Final   • OXAZEPAM 05/02/2023 Negative  50 ng/ml ng/ml Final   • TEMAZEPAM 05/02/2023 Negative  50 ng/ml ng/ml Final   • ETG 05/02/2023 Negative  200 ng/ml ng/ml Final   • BENZOYLECGONINE 05/02/2023 Negative  100 ng/ml ng/ml Final   • 6-JEREMIAS 05/02/2023 Negative  10 ng/ml ng/ml Final   • MDMA 05/02/2023 Negative  100 ng/ml ng/ml Final   • PCP 05/02/2023 Negative  20 ng/ml ng/ml Final   • DELTA-9-THC 05/02/2023 Negative  20 ng/ml ng/ml Final   • AMPHETAMINE 05/02/2023 Negative  250 ng/ml ng/ml Final   • METHAMPHETAMINE 05/02/2023 Negative  100 ng/ml ng/ml Final   • METHYLPHENIDATE 05/02/2023 Negative  10 ng/ml ng/ml Final   • PHENTERMINE 05/02/2023 Negative  100 ng/ml ng/ml Final   • RITALINIC ACID 05/02/2023 Negative  50 ng/ml ng/ml Final   • CARISOPRODOL 05/02/2023 Negative  100 ng/ml ng/ml Final   • GABAPENTIN 05/02/2023 Negative  500 ng/ml ng/ml Final   • PREGABALIN 05/02/2023 Negative  250 ng/ml ng/ml Final   • ZOLPIDEM 05/02/2023 Negative  2 ng/ml ng/ml Final   • CARBOXYZOLPIDEM 05/02/2023 Negative  10 ng/ml ng/ml Final   • PH 05/02/2023 8.8  4.5 - 9 Final   • CREATININE 05/02/2023 185.7  20 - 300 mg/dL mg/dL Final   • SPECIFIC GRAVITY 05/02/2023 1.021  1.003 - 1.035 Final       Mental Status Exam  Hygiene:  good  Dress: casual  Attitude: cooperative and agreeable   Motor Activity: appropriate  Eye Contact:  good  Speech: regular rate and rhythm   Mood:  calm and cooperative  Affect:  Appropriate  Thought Processes:  Linear  Thought Content:  Normal  Suicidal Thoughts:  denies  Homicidal Thoughts:  denies  Crisis Safety Plan: Safety plan has been discussed.    Hallucinations:  Unknown to clinician.   Reliability: fair  Insight: fair  Judgement: fair  Impulse Control: fair    Recovery/spiritual support group attendance: No.      Progress toward goal: Not at goal    Prognosis: Fair with Ongoing Treatment     Self-reported number of days sober: Patient reported 48 days on check in form.     Patient will contact this office, call 911 or present to the nearest emergency room should suicidal or homicidal ideations occur.    Impression/Formulation:    ICD-10-CM ICD-9-CM   1. Opioid use disorder, severe, dependence  F11.20 304.00       Clinical Maneuvering/Interventions: Therapist utilized a person-centered approach to build rapport with group member. Therapist implemented motivational interviewing techniques to assist client with exploring and resolving ambivalence associated with commitment to change behaviors related to substance use and addiction. Therapist applied cognitive behavioral strategies to facilitate identification of maladaptive patterns of thinking and behavior that contribute to client's risk for continued substance use and relapse. Therapist employed group interaction activities to build rapport among group members, promote sobriety, and emphasize relapse prevention. Therapist promoted safe nonjudgmental environment by providing group members with unconditional positive regard and encouraging group members to comply with group rules and guidelines. Therapist assisted group member with identifying and implementing healthier coping strategies.      Plan:  Continue [facility] IOP Phase I   Aftercare:  [facility] Phase II  Program Assignments:  Personal recovery plan, relapse prevention plan, attendance of recovery support group meetings, exploration of sponsorship, drug/alcohol screens.     Ta Ambrose LCSW  5/17/2023  14:39 EDT

## 2023-05-17 NOTE — PROGRESS NOTES
"[facility] GROUP     Date: 05/08/2023  Name: Taye Ahmadi    Time In: 1800   Time Out: 2100     Number of participants: 7.    IOP GROUP NOTE     Data: 3 hour IOP group therapy session (Check-ins, Coping Skills, Relapse Prevention)     Check Ins: Therapist continued facilitation of rapport building strategies between group members. Therapist asked that each patient check in with home life and recovery efforts and identify triggers, cravings, and high risk situations that arise between group sessions. Therapist provided empathy and support during group session.     Session Content/Coping Skills: Check ins completed by group members. , Annemarie, and  Gian joined for first part of session and Gian shared Just for Today reading and Annemarie also shared a reading. 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 3 Facts icebreaker activity completed. Living in Balance, Session 3 began. Group members began identifying their triggers.      Response: Patient attended class in person. Patient participated in completion of check in form. Patient on check in form answered no to question \"currently or since last group meeting have you had any suicidal thoughts or plan or intent to hurt yourself”, and patient also answered no on check in form to question of \"currently or since your last group meeting, have you had any homicidal thoughts or plan or intent to hurt others\". Patient on check in form reported that he had utilized new supports since last group meeting.     Personal Assessment 0-10 Scale (0-none, 10-high)    Anxiety:  1   Depression:  0   Cravings: 0     Assessment:     ..  Lab on 05/02/2023   Component Date Value Ref Range Status   • THC, Screen, Urine 05/02/2023 Negative  Negative Final   • Phencyclidine (PCP), Urine 05/02/2023 Negative  Negative Final   • Cocaine Screen, Urine 05/02/2023 Negative  Negative " Final   • Methamphetamine, Ur 05/02/2023 Negative  Negative Final   • Opiate Screen 05/02/2023 Negative  Negative Final   • Amphetamine Screen, Urine 05/02/2023 Negative  Negative Final   • Benzodiazepine Screen, Urine 05/02/2023 Negative  Negative Final   • Tricyclic Antidepressants Screen 05/02/2023 Negative  Negative Final   • Methadone Screen, Urine 05/02/2023 Negative  Negative Final   • Barbiturates Screen, Urine 05/02/2023 Negative  Negative Final   • Oxycodone Screen, Urine 05/02/2023 Negative  Negative Final   • Propoxyphene Screen 05/02/2023 Negative  Negative Final   • Buprenorphine, Screen, Urine 05/02/2023 Negative  Negative Final   • CODEINE 05/02/2023 Negative  50 ng/ml ng/ml Final   • HYDROCODONE 05/02/2023 Negative  50 ng/ml ng/ml Final   • NORHYDROCODONE 05/02/2023 Negative  50 ng/ml ng/ml Final   • HYDROMORPHONE 05/02/2023 Negative  50 ng/ml ng/ml Final   • MORPHINE 05/02/2023 Negative  50 ng/ml ng/ml Final   • FENTANYL 05/02/2023 Negative  2 ng/ml ng/ml Final   • NORFENTANYL 05/02/2023 Negative  10 ng/ml ng/ml Final   • METHADONE 05/02/2023 Negative  25 ng/ml ng/ml Final   • EDDP 05/02/2023 Negative  50 ng/ml ng/ml Final   • OXYCODONE 05/02/2023 Negative  50 ng/ml ng/ml Final   • NOROXYCODONE 05/02/2023 Negative  50 ng/ml ng/ml Final   • OXYMORPHONE 05/02/2023 Negative  50 ng/ml ng/ml Final   • TAPENTADOL 05/02/2023 Negative  50 ng/ml ng/ml Final   • TRAMADOL 05/02/2023 Negative  50 ng/ml ng/ml Final   • BUPRENORPHINE 05/02/2023 Negative  7.5 ng/ml ng/ml Final   • NORBUPRENORPHINE 05/02/2023 Negative  10 ng/ml ng/ml Final   • AMOBARBITAL 05/02/2023 Negative  100 ng/ml ng/ml Final   • BUTABARBITAL 05/02/2023 Negative  100 ng/ml ng/ml Final   • BUTALBITAL 05/02/2023 Negative  100 ng/ml ng/ml Final   • PHENOBARBITAL 05/02/2023 Negative  100 ng/ml ng/ml Final   • SECOBARBITAL 05/02/2023 Negative  100 ng/ml ng/ml Final   • ALPRAZOLAM 05/02/2023 Negative  50 ng/ml ng/ml Final   •  ALPHA-HYDROXYALPRAZOLAM 05/02/2023 Negative  50 ng/ml ng/ml Final   • CLONAZEPAM 05/02/2023 Negative  50 ng/ml ng/ml Final   • 7- AMINOCLONAZEPAM 05/02/2023 Negative  50 ng/ml ng/ml Final   • DIAZEPAM 05/02/2023 Negative  50 ng/ml ng/ml Final   • NORDIAZEPAM 05/02/2023 Negative  50 ng/ml ng/ml Final   • LORAZEPAM 05/02/2023 Negative  100 ng/ml ng/ml Final   • MIDAZOLAM 05/02/2023 Negative  50 ng/ml ng/ml Final   • ALPHA-HYDROXYMIDAZOLAM 05/02/2023 Negative  50 ng/ml ng/ml Final   • OXAZEPAM 05/02/2023 Negative  50 ng/ml ng/ml Final   • TEMAZEPAM 05/02/2023 Negative  50 ng/ml ng/ml Final   • ETG 05/02/2023 Negative  200 ng/ml ng/ml Final   • BENZOYLECGONINE 05/02/2023 Negative  100 ng/ml ng/ml Final   • 6-JEREMIAS 05/02/2023 Negative  10 ng/ml ng/ml Final   • MDMA 05/02/2023 Negative  100 ng/ml ng/ml Final   • PCP 05/02/2023 Negative  20 ng/ml ng/ml Final   • DELTA-9-THC 05/02/2023 Negative  20 ng/ml ng/ml Final   • AMPHETAMINE 05/02/2023 Negative  250 ng/ml ng/ml Final   • METHAMPHETAMINE 05/02/2023 Negative  100 ng/ml ng/ml Final   • METHYLPHENIDATE 05/02/2023 Negative  10 ng/ml ng/ml Final   • PHENTERMINE 05/02/2023 Negative  100 ng/ml ng/ml Final   • RITALINIC ACID 05/02/2023 Negative  50 ng/ml ng/ml Final   • CARISOPRODOL 05/02/2023 Negative  100 ng/ml ng/ml Final   • GABAPENTIN 05/02/2023 Negative  500 ng/ml ng/ml Final   • PREGABALIN 05/02/2023 Negative  250 ng/ml ng/ml Final   • ZOLPIDEM 05/02/2023 Negative  2 ng/ml ng/ml Final   • CARBOXYZOLPIDEM 05/02/2023 Negative  10 ng/ml ng/ml Final   • PH 05/02/2023 8.8  4.5 - 9 Final   • CREATININE 05/02/2023 185.7  20 - 300 mg/dL mg/dL Final   • SPECIFIC GRAVITY 05/02/2023 1.021  1.003 - 1.035 Final       Mental Status Exam  Hygiene:  good  Dress: casual  Attitude: cooperative and agreeable   Motor Activity: appropriate  Eye Contact:  good  Speech: regular rate and rhythm   Mood:  calm and cooperative  Affect:  Appropriate  Thought Processes:  Linear  Thought Content:   Normal  Suicidal Thoughts:  denies  Homicidal Thoughts:  denies  Crisis Safety Plan: Safety plan has been discussed.   Hallucinations:  Unknown to clinician.   Reliability: fair  Insight: fair  Judgement: fair  Impulse Control: fair    Recovery/spiritual support group attendance: No.      Progress toward goal: Not at goal    Prognosis: Fair with Ongoing Treatment     Self-reported number of days sober: 51.    Patient will contact this office, call 911 or present to the nearest emergency room should suicidal or homicidal ideations occur.    Impression/Formulation:    ICD-10-CM ICD-9-CM   1. Opioid use disorder, severe, dependence  F11.20 304.00       Clinical Maneuvering/Interventions: Therapist utilized a person-centered approach to build rapport with group member. Therapist implemented motivational interviewing techniques to assist client with exploring and resolving ambivalence associated with commitment to change behaviors related to substance use and addiction. Therapist applied cognitive behavioral strategies to facilitate identification of maladaptive patterns of thinking and behavior that contribute to client's risk for continued substance use and relapse. Therapist employed group interaction activities to build rapport among group members, promote sobriety, and emphasize relapse prevention. Therapist promoted safe nonjudgmental environment by providing group members with unconditional positive regard and encouraging group members to comply with group rules and guidelines. Therapist assisted group member with identifying and implementing healthier coping strategies.      Plan:  Continue [facility] IOP Phase I   Aftercare:  [facility] Phase II  Program Assignments:  Personal recovery plan, relapse prevention plan, attendance of recovery support group meetings, exploration of sponsorship, drug/alcohol screens.     Ta Ambrose LCSW  5/17/2023  16:18 EDT

## 2023-05-18 ENCOUNTER — OFFICE VISIT (OUTPATIENT)
Dept: PSYCHIATRY | Facility: HOSPITAL | Age: 38
End: 2023-05-18
Payer: MEDICAID

## 2023-05-18 DIAGNOSIS — F11.20 OPIOID USE DISORDER, SEVERE, DEPENDENCE: Primary | ICD-10-CM

## 2023-05-18 PROCEDURE — H0015 ALCOHOL AND/OR DRUG SERVICES: HCPCS | Performed by: NURSE PRACTITIONER

## 2023-05-18 NOTE — PROGRESS NOTES
"[facility] IOP GROUP     Date: 05/10/2023  Name: Taye Ahmadi    Time In: 1800   Time Out: 2055     Number of participants: 7.    IOP GROUP NOTE     Data: 3 hour IOP group therapy session (Check-ins, Coping Skills, Relapse Prevention)     Check Ins: Therapist continued facilitation of rapport building strategies between group members. Therapist asked that each patient check in with home life and recovery efforts and identify triggers, cravings, and high risk situations that arise between group sessions. Therapist provided empathy and support during group session.     Session Content/Coping Skills: Check ins completed by group members. Tiffany Strickland “Autobiography in Five Chapters” reviewed and discussed. Living in Balance, Session 3 finished. Cravings Basic Principals and Coping with Cravings Skills List psychoeducational material reviewed with group members (Retrieved from [employer] website). [address] Grounding Technique Psychoeducational Material reviewed. Coping Skills Addiction and Distress Tolerance psychoeducational material reviewed and discussed (Retrieved from [employer]). Relapse Prevention Plans assigned as homework. Outdoor 5 senses grounding activity completed by group members.      Response: Patient attended class in person. Patient participated in completion of check in form. Patient on check in form answered no to question \"currently or since last group meeting have you had any suicidal thoughts or plan or intent to hurt yourself”, and patient also answered no on check in form to question of \"currently or since your last group meeting, have you had any homicidal thoughts or plan or intent to hurt others\". Patient on check in form reported that he had utilized new supports since last group meeting.     Personal Assessment 0-10 Scale (0-none, 10-high)    Anxiety:  2   Depression:  0   Cravings: 0     Assessment:     ..  Lab on 05/09/2023   Component Date Value Ref Range Status   • THC, " Screen, Urine 05/09/2023 Negative  Negative Final   • Phencyclidine (PCP), Urine 05/09/2023 Negative  Negative Final   • Cocaine Screen, Urine 05/09/2023 Negative  Negative Final   • Methamphetamine, Ur 05/09/2023 Negative  Negative Final   • Opiate Screen 05/09/2023 Negative  Negative Final   • Amphetamine Screen, Urine 05/09/2023 Negative  Negative Final   • Benzodiazepine Screen, Urine 05/09/2023 Negative  Negative Final   • Tricyclic Antidepressants Screen 05/09/2023 Negative  Negative Final   • Methadone Screen, Urine 05/09/2023 Negative  Negative Final   • Barbiturates Screen, Urine 05/09/2023 Negative  Negative Final   • Oxycodone Screen, Urine 05/09/2023 Negative  Negative Final   • Propoxyphene Screen 05/09/2023 Negative  Negative Final   • Buprenorphine, Screen, Urine 05/09/2023 Negative  Negative Final   • CODEINE 05/09/2023 Negative  50 ng/ml ng/ml Final   • HYDROCODONE 05/09/2023 Negative  50 ng/ml ng/ml Final   • NORHYDROCODONE 05/09/2023 Negative  50 ng/ml ng/ml Final   • HYDROMORPHONE 05/09/2023 Negative  50 ng/ml ng/ml Final   • MORPHINE 05/09/2023 Negative  50 ng/ml ng/ml Final   • FENTANYL 05/09/2023 Negative  2 ng/ml ng/ml Final   • NORFENTANYL 05/09/2023 Negative  10 ng/ml ng/ml Final   • METHADONE 05/09/2023 Negative  25 ng/ml ng/ml Final   • EDDP 05/09/2023 Negative  50 ng/ml ng/ml Final   • OXYCODONE 05/09/2023 Negative  50 ng/ml ng/ml Final   • NOROXYCODONE 05/09/2023 Negative  50 ng/ml ng/ml Final   • OXYMORPHONE 05/09/2023 Negative  50 ng/ml ng/ml Final   • TAPENTADOL 05/09/2023 Negative  50 ng/ml ng/ml Final   • TRAMADOL 05/09/2023 Negative  50 ng/ml ng/ml Final   • BUPRENORPHINE 05/09/2023 Negative  7.5 ng/ml ng/ml Final   • NORBUPRENORPHINE 05/09/2023 Negative  10 ng/ml ng/ml Final   • AMOBARBITAL 05/09/2023 Negative  100 ng/ml ng/ml Final   • BUTABARBITAL 05/09/2023 Negative  100 ng/ml ng/ml Final   • BUTALBITAL 05/09/2023 Negative  100 ng/ml ng/ml Final   • PHENOBARBITAL 05/09/2023  Negative  100 ng/ml ng/ml Final   • SECOBARBITAL 05/09/2023 Negative  100 ng/ml ng/ml Final   • ALPRAZOLAM 05/09/2023 Negative  50 ng/ml ng/ml Final   • ALPHA-HYDROXYALPRAZOLAM 05/09/2023 Negative  50 ng/ml ng/ml Final   • CLONAZEPAM 05/09/2023 Negative  50 ng/ml ng/ml Final   • 7- AMINOCLONAZEPAM 05/09/2023 Negative  50 ng/ml ng/ml Final   • DIAZEPAM 05/09/2023 Negative  50 ng/ml ng/ml Final   • NORDIAZEPAM 05/09/2023 Negative  50 ng/ml ng/ml Final   • LORAZEPAM 05/09/2023 Negative  100 ng/ml ng/ml Final   • MIDAZOLAM 05/09/2023 Negative  50 ng/ml ng/ml Final   • ALPHA-HYDROXYMIDAZOLAM 05/09/2023 Negative  50 ng/ml ng/ml Final   • OXAZEPAM 05/09/2023 Negative  50 ng/ml ng/ml Final   • TEMAZEPAM 05/09/2023 Negative  50 ng/ml ng/ml Final   • ETG 05/09/2023 Negative  200 ng/ml ng/ml Final   • BENZOYLECGONINE 05/09/2023 Negative  100 ng/ml ng/ml Final   • 6-JEREMIAS 05/09/2023 Negative  10 ng/ml ng/ml Final   • MDMA 05/09/2023 Negative  100 ng/ml ng/ml Final   • PCP 05/09/2023 Negative  20 ng/ml ng/ml Final   • DELTA-9-THC 05/09/2023 Negative  20 ng/ml ng/ml Final   • AMPHETAMINE 05/09/2023 Negative  250 ng/ml ng/ml Final   • METHAMPHETAMINE 05/09/2023 Negative  100 ng/ml ng/ml Final   • METHYLPHENIDATE 05/09/2023 Negative  10 ng/ml ng/ml Final   • PHENTERMINE 05/09/2023 Negative  100 ng/ml ng/ml Final   • RITALINIC ACID 05/09/2023 Negative  50 ng/ml ng/ml Final   • CARISOPRODOL 05/09/2023 Negative  100 ng/ml ng/ml Final   • GABAPENTIN 05/09/2023 Negative  500 ng/ml ng/ml Final   • PREGABALIN 05/09/2023 Negative  250 ng/ml ng/ml Final   • ZOLPIDEM 05/09/2023 Negative  2 ng/ml ng/ml Final   • CARBOXYZOLPIDEM 05/09/2023 Negative  10 ng/ml ng/ml Final   • PH 05/09/2023 8.3  4.5 - 9 Final   • CREATININE 05/09/2023 21.7  20 - 300 mg/dL mg/dL Final   • SPECIFIC GRAVITY 05/09/2023 1.002  1.003 - 1.035 Final   Lab on 05/02/2023   Component Date Value Ref Range Status   • THC, Screen, Urine 05/02/2023 Negative  Negative Final   •  Phencyclidine (PCP), Urine 05/02/2023 Negative  Negative Final   • Cocaine Screen, Urine 05/02/2023 Negative  Negative Final   • Methamphetamine, Ur 05/02/2023 Negative  Negative Final   • Opiate Screen 05/02/2023 Negative  Negative Final   • Amphetamine Screen, Urine 05/02/2023 Negative  Negative Final   • Benzodiazepine Screen, Urine 05/02/2023 Negative  Negative Final   • Tricyclic Antidepressants Screen 05/02/2023 Negative  Negative Final   • Methadone Screen, Urine 05/02/2023 Negative  Negative Final   • Barbiturates Screen, Urine 05/02/2023 Negative  Negative Final   • Oxycodone Screen, Urine 05/02/2023 Negative  Negative Final   • Propoxyphene Screen 05/02/2023 Negative  Negative Final   • Buprenorphine, Screen, Urine 05/02/2023 Negative  Negative Final   • CODEINE 05/02/2023 Negative  50 ng/ml ng/ml Final   • HYDROCODONE 05/02/2023 Negative  50 ng/ml ng/ml Final   • NORHYDROCODONE 05/02/2023 Negative  50 ng/ml ng/ml Final   • HYDROMORPHONE 05/02/2023 Negative  50 ng/ml ng/ml Final   • MORPHINE 05/02/2023 Negative  50 ng/ml ng/ml Final   • FENTANYL 05/02/2023 Negative  2 ng/ml ng/ml Final   • NORFENTANYL 05/02/2023 Negative  10 ng/ml ng/ml Final   • METHADONE 05/02/2023 Negative  25 ng/ml ng/ml Final   • EDDP 05/02/2023 Negative  50 ng/ml ng/ml Final   • OXYCODONE 05/02/2023 Negative  50 ng/ml ng/ml Final   • NOROXYCODONE 05/02/2023 Negative  50 ng/ml ng/ml Final   • OXYMORPHONE 05/02/2023 Negative  50 ng/ml ng/ml Final   • TAPENTADOL 05/02/2023 Negative  50 ng/ml ng/ml Final   • TRAMADOL 05/02/2023 Negative  50 ng/ml ng/ml Final   • BUPRENORPHINE 05/02/2023 Negative  7.5 ng/ml ng/ml Final   • NORBUPRENORPHINE 05/02/2023 Negative  10 ng/ml ng/ml Final   • AMOBARBITAL 05/02/2023 Negative  100 ng/ml ng/ml Final   • BUTABARBITAL 05/02/2023 Negative  100 ng/ml ng/ml Final   • BUTALBITAL 05/02/2023 Negative  100 ng/ml ng/ml Final   • PHENOBARBITAL 05/02/2023 Negative  100 ng/ml ng/ml Final   • SECOBARBITAL  05/02/2023 Negative  100 ng/ml ng/ml Final   • ALPRAZOLAM 05/02/2023 Negative  50 ng/ml ng/ml Final   • ALPHA-HYDROXYALPRAZOLAM 05/02/2023 Negative  50 ng/ml ng/ml Final   • CLONAZEPAM 05/02/2023 Negative  50 ng/ml ng/ml Final   • 7- AMINOCLONAZEPAM 05/02/2023 Negative  50 ng/ml ng/ml Final   • DIAZEPAM 05/02/2023 Negative  50 ng/ml ng/ml Final   • NORDIAZEPAM 05/02/2023 Negative  50 ng/ml ng/ml Final   • LORAZEPAM 05/02/2023 Negative  100 ng/ml ng/ml Final   • MIDAZOLAM 05/02/2023 Negative  50 ng/ml ng/ml Final   • ALPHA-HYDROXYMIDAZOLAM 05/02/2023 Negative  50 ng/ml ng/ml Final   • OXAZEPAM 05/02/2023 Negative  50 ng/ml ng/ml Final   • TEMAZEPAM 05/02/2023 Negative  50 ng/ml ng/ml Final   • ETG 05/02/2023 Negative  200 ng/ml ng/ml Final   • BENZOYLECGONINE 05/02/2023 Negative  100 ng/ml ng/ml Final   • 6-JEREMIAS 05/02/2023 Negative  10 ng/ml ng/ml Final   • MDMA 05/02/2023 Negative  100 ng/ml ng/ml Final   • PCP 05/02/2023 Negative  20 ng/ml ng/ml Final   • DELTA-9-THC 05/02/2023 Negative  20 ng/ml ng/ml Final   • AMPHETAMINE 05/02/2023 Negative  250 ng/ml ng/ml Final   • METHAMPHETAMINE 05/02/2023 Negative  100 ng/ml ng/ml Final   • METHYLPHENIDATE 05/02/2023 Negative  10 ng/ml ng/ml Final   • PHENTERMINE 05/02/2023 Negative  100 ng/ml ng/ml Final   • RITALINIC ACID 05/02/2023 Negative  50 ng/ml ng/ml Final   • CARISOPRODOL 05/02/2023 Negative  100 ng/ml ng/ml Final   • GABAPENTIN 05/02/2023 Negative  500 ng/ml ng/ml Final   • PREGABALIN 05/02/2023 Negative  250 ng/ml ng/ml Final   • ZOLPIDEM 05/02/2023 Negative  2 ng/ml ng/ml Final   • CARBOXYZOLPIDEM 05/02/2023 Negative  10 ng/ml ng/ml Final   • PH 05/02/2023 8.8  4.5 - 9 Final   • CREATININE 05/02/2023 185.7  20 - 300 mg/dL mg/dL Final   • SPECIFIC GRAVITY 05/02/2023 1.021  1.003 - 1.035 Final       Mental Status Exam  Hygiene:  good  Dress: casual  Attitude: cooperative and agreeable   Motor Activity: appropriate  Eye Contact:  good  Speech: regular rate and  rhythm   Mood:  calm and cooperative  Affect:  Appropriate  Thought Processes:  Linear  Thought Content:  Normal  Suicidal Thoughts:  denies  Homicidal Thoughts:  denies  Crisis Safety Plan: Safety plan has been discussed.   Hallucinations:  Unknown to clinician.   Reliability: fair  Insight: fair  Judgement: fair  Impulse Control: fair    Recovery/spiritual support group attendance: No.      Progress toward goal: Not at goal    Prognosis: Fair with Ongoing Treatment     Self-reported number of days sober: Patient reported 53 on check in form.     Patient will contact this office, call 911 or present to the nearest emergency room should suicidal or homicidal ideations occur.    Impression/Formulation:    ICD-10-CM ICD-9-CM   1. Opioid use disorder, severe, dependence  F11.20 304.00       Clinical Maneuvering/Interventions: Therapist utilized a person-centered approach to build rapport with group member. Therapist implemented motivational interviewing techniques to assist client with exploring and resolving ambivalence associated with commitment to change behaviors related to substance use and addiction. Therapist applied cognitive behavioral strategies to facilitate identification of maladaptive patterns of thinking and behavior that contribute to client's risk for continued substance use and relapse. Therapist employed group interaction activities to build rapport among group members, promote sobriety, and emphasize relapse prevention. Therapist promoted safe nonjudgmental environment by providing group members with unconditional positive regard and encouraging group members to comply with group rules and guidelines. Therapist assisted group member with identifying and implementing healthier coping strategies.      Plan:  Continue [facility] IOP Phase I   Aftercare:  [facility] Phase II  Program Assignments:  Personal recovery plan, relapse prevention plan,  attendance of recovery support group meetings, exploration of sponsorship, drug/alcohol screens.     Ta Ambrose LCSW  5/18/2023  10:35 EDT

## 2023-05-18 NOTE — PROGRESS NOTES
"[facility] IOP GROUP     Date: 05/11/2023  Name: Taye Ahmadi    Time In: 1800   Time Out: 2100     Number of participants: 7.    IOP GROUP NOTE     Data: 3 hour IOP group therapy session (Check-ins, Coping Skills, Relapse Prevention)     Check Ins: Therapist continued facilitation of rapport building strategies between group members. Therapist asked that each patient check in with home life and recovery efforts and identify triggers, cravings, and high risk situations that arise between group sessions. Therapist provided empathy and support during group session.     Session Content/Coping Skills: Check ins completed by group members. Clinician discussed motivations for treatment with group members. Individual check ins completed with group members. Relapse Prevention Plans finalized and shared by group members. Coping Skills Bingo activity completed. Group members were assigned homework of completing one random act of kindness and to prepare to discuss the following group session.      Response: Patient attended class in person. Patient participated in completion of check in form. Patient on check in form answered no to question \"currently or since last group meeting have you had any suicidal thoughts or plan or intent to hurt yourself”, and patient also answered no on check in form to question of \"currently or since your last group meeting, have you had any homicidal thoughts or plan or intent to hurt others\".     Personal Assessment 0-10 Scale (0-none, 10-high)    Anxiety:  3   Depression:  1   Cravings: 0     Assessment:     ..  Lab on 05/09/2023   Component Date Value Ref Range Status   • THC, Screen, Urine 05/09/2023 Negative  Negative Final   • Phencyclidine (PCP), Urine 05/09/2023 Negative  Negative Final   • Cocaine Screen, Urine 05/09/2023 Negative  Negative Final   • Methamphetamine, Ur 05/09/2023 Negative  Negative Final   • Opiate Screen 05/09/2023 Negative  Negative Final   • Amphetamine Screen, Urine " 05/09/2023 Negative  Negative Final   • Benzodiazepine Screen, Urine 05/09/2023 Negative  Negative Final   • Tricyclic Antidepressants Screen 05/09/2023 Negative  Negative Final   • Methadone Screen, Urine 05/09/2023 Negative  Negative Final   • Barbiturates Screen, Urine 05/09/2023 Negative  Negative Final   • Oxycodone Screen, Urine 05/09/2023 Negative  Negative Final   • Propoxyphene Screen 05/09/2023 Negative  Negative Final   • Buprenorphine, Screen, Urine 05/09/2023 Negative  Negative Final   • CODEINE 05/09/2023 Negative  50 ng/ml ng/ml Final   • HYDROCODONE 05/09/2023 Negative  50 ng/ml ng/ml Final   • NORHYDROCODONE 05/09/2023 Negative  50 ng/ml ng/ml Final   • HYDROMORPHONE 05/09/2023 Negative  50 ng/ml ng/ml Final   • MORPHINE 05/09/2023 Negative  50 ng/ml ng/ml Final   • FENTANYL 05/09/2023 Negative  2 ng/ml ng/ml Final   • NORFENTANYL 05/09/2023 Negative  10 ng/ml ng/ml Final   • METHADONE 05/09/2023 Negative  25 ng/ml ng/ml Final   • EDDP 05/09/2023 Negative  50 ng/ml ng/ml Final   • OXYCODONE 05/09/2023 Negative  50 ng/ml ng/ml Final   • NOROXYCODONE 05/09/2023 Negative  50 ng/ml ng/ml Final   • OXYMORPHONE 05/09/2023 Negative  50 ng/ml ng/ml Final   • TAPENTADOL 05/09/2023 Negative  50 ng/ml ng/ml Final   • TRAMADOL 05/09/2023 Negative  50 ng/ml ng/ml Final   • BUPRENORPHINE 05/09/2023 Negative  7.5 ng/ml ng/ml Final   • NORBUPRENORPHINE 05/09/2023 Negative  10 ng/ml ng/ml Final   • AMOBARBITAL 05/09/2023 Negative  100 ng/ml ng/ml Final   • BUTABARBITAL 05/09/2023 Negative  100 ng/ml ng/ml Final   • BUTALBITAL 05/09/2023 Negative  100 ng/ml ng/ml Final   • PHENOBARBITAL 05/09/2023 Negative  100 ng/ml ng/ml Final   • SECOBARBITAL 05/09/2023 Negative  100 ng/ml ng/ml Final   • ALPRAZOLAM 05/09/2023 Negative  50 ng/ml ng/ml Final   • ALPHA-HYDROXYALPRAZOLAM 05/09/2023 Negative  50 ng/ml ng/ml Final   • CLONAZEPAM 05/09/2023 Negative  50 ng/ml ng/ml Final   • 7- AMINOCLONAZEPAM 05/09/2023 Negative  50  ng/ml ng/ml Final   • DIAZEPAM 05/09/2023 Negative  50 ng/ml ng/ml Final   • NORDIAZEPAM 05/09/2023 Negative  50 ng/ml ng/ml Final   • LORAZEPAM 05/09/2023 Negative  100 ng/ml ng/ml Final   • MIDAZOLAM 05/09/2023 Negative  50 ng/ml ng/ml Final   • ALPHA-HYDROXYMIDAZOLAM 05/09/2023 Negative  50 ng/ml ng/ml Final   • OXAZEPAM 05/09/2023 Negative  50 ng/ml ng/ml Final   • TEMAZEPAM 05/09/2023 Negative  50 ng/ml ng/ml Final   • ETG 05/09/2023 Negative  200 ng/ml ng/ml Final   • BENZOYLECGONINE 05/09/2023 Negative  100 ng/ml ng/ml Final   • 6-JEREMIAS 05/09/2023 Negative  10 ng/ml ng/ml Final   • MDMA 05/09/2023 Negative  100 ng/ml ng/ml Final   • PCP 05/09/2023 Negative  20 ng/ml ng/ml Final   • DELTA-9-THC 05/09/2023 Negative  20 ng/ml ng/ml Final   • AMPHETAMINE 05/09/2023 Negative  250 ng/ml ng/ml Final   • METHAMPHETAMINE 05/09/2023 Negative  100 ng/ml ng/ml Final   • METHYLPHENIDATE 05/09/2023 Negative  10 ng/ml ng/ml Final   • PHENTERMINE 05/09/2023 Negative  100 ng/ml ng/ml Final   • RITALINIC ACID 05/09/2023 Negative  50 ng/ml ng/ml Final   • CARISOPRODOL 05/09/2023 Negative  100 ng/ml ng/ml Final   • GABAPENTIN 05/09/2023 Negative  500 ng/ml ng/ml Final   • PREGABALIN 05/09/2023 Negative  250 ng/ml ng/ml Final   • ZOLPIDEM 05/09/2023 Negative  2 ng/ml ng/ml Final   • CARBOXYZOLPIDEM 05/09/2023 Negative  10 ng/ml ng/ml Final   • PH 05/09/2023 8.3  4.5 - 9 Final   • CREATININE 05/09/2023 21.7  20 - 300 mg/dL mg/dL Final   • SPECIFIC GRAVITY 05/09/2023 1.002  1.003 - 1.035 Final   Lab on 05/02/2023   Component Date Value Ref Range Status   • THC, Screen, Urine 05/02/2023 Negative  Negative Final   • Phencyclidine (PCP), Urine 05/02/2023 Negative  Negative Final   • Cocaine Screen, Urine 05/02/2023 Negative  Negative Final   • Methamphetamine, Ur 05/02/2023 Negative  Negative Final   • Opiate Screen 05/02/2023 Negative  Negative Final   • Amphetamine Screen, Urine 05/02/2023 Negative  Negative Final   • Benzodiazepine  Screen, Urine 05/02/2023 Negative  Negative Final   • Tricyclic Antidepressants Screen 05/02/2023 Negative  Negative Final   • Methadone Screen, Urine 05/02/2023 Negative  Negative Final   • Barbiturates Screen, Urine 05/02/2023 Negative  Negative Final   • Oxycodone Screen, Urine 05/02/2023 Negative  Negative Final   • Propoxyphene Screen 05/02/2023 Negative  Negative Final   • Buprenorphine, Screen, Urine 05/02/2023 Negative  Negative Final   • CODEINE 05/02/2023 Negative  50 ng/ml ng/ml Final   • HYDROCODONE 05/02/2023 Negative  50 ng/ml ng/ml Final   • NORHYDROCODONE 05/02/2023 Negative  50 ng/ml ng/ml Final   • HYDROMORPHONE 05/02/2023 Negative  50 ng/ml ng/ml Final   • MORPHINE 05/02/2023 Negative  50 ng/ml ng/ml Final   • FENTANYL 05/02/2023 Negative  2 ng/ml ng/ml Final   • NORFENTANYL 05/02/2023 Negative  10 ng/ml ng/ml Final   • METHADONE 05/02/2023 Negative  25 ng/ml ng/ml Final   • EDDP 05/02/2023 Negative  50 ng/ml ng/ml Final   • OXYCODONE 05/02/2023 Negative  50 ng/ml ng/ml Final   • NOROXYCODONE 05/02/2023 Negative  50 ng/ml ng/ml Final   • OXYMORPHONE 05/02/2023 Negative  50 ng/ml ng/ml Final   • TAPENTADOL 05/02/2023 Negative  50 ng/ml ng/ml Final   • TRAMADOL 05/02/2023 Negative  50 ng/ml ng/ml Final   • BUPRENORPHINE 05/02/2023 Negative  7.5 ng/ml ng/ml Final   • NORBUPRENORPHINE 05/02/2023 Negative  10 ng/ml ng/ml Final   • AMOBARBITAL 05/02/2023 Negative  100 ng/ml ng/ml Final   • BUTABARBITAL 05/02/2023 Negative  100 ng/ml ng/ml Final   • BUTALBITAL 05/02/2023 Negative  100 ng/ml ng/ml Final   • PHENOBARBITAL 05/02/2023 Negative  100 ng/ml ng/ml Final   • SECOBARBITAL 05/02/2023 Negative  100 ng/ml ng/ml Final   • ALPRAZOLAM 05/02/2023 Negative  50 ng/ml ng/ml Final   • ALPHA-HYDROXYALPRAZOLAM 05/02/2023 Negative  50 ng/ml ng/ml Final   • CLONAZEPAM 05/02/2023 Negative  50 ng/ml ng/ml Final   • 7- AMINOCLONAZEPAM 05/02/2023 Negative  50 ng/ml ng/ml Final   • DIAZEPAM 05/02/2023 Negative  50  ng/ml ng/ml Final   • NORDIAZEPAM 05/02/2023 Negative  50 ng/ml ng/ml Final   • LORAZEPAM 05/02/2023 Negative  100 ng/ml ng/ml Final   • MIDAZOLAM 05/02/2023 Negative  50 ng/ml ng/ml Final   • ALPHA-HYDROXYMIDAZOLAM 05/02/2023 Negative  50 ng/ml ng/ml Final   • OXAZEPAM 05/02/2023 Negative  50 ng/ml ng/ml Final   • TEMAZEPAM 05/02/2023 Negative  50 ng/ml ng/ml Final   • ETG 05/02/2023 Negative  200 ng/ml ng/ml Final   • BENZOYLECGONINE 05/02/2023 Negative  100 ng/ml ng/ml Final   • 6-JEREMIAS 05/02/2023 Negative  10 ng/ml ng/ml Final   • MDMA 05/02/2023 Negative  100 ng/ml ng/ml Final   • PCP 05/02/2023 Negative  20 ng/ml ng/ml Final   • DELTA-9-THC 05/02/2023 Negative  20 ng/ml ng/ml Final   • AMPHETAMINE 05/02/2023 Negative  250 ng/ml ng/ml Final   • METHAMPHETAMINE 05/02/2023 Negative  100 ng/ml ng/ml Final   • METHYLPHENIDATE 05/02/2023 Negative  10 ng/ml ng/ml Final   • PHENTERMINE 05/02/2023 Negative  100 ng/ml ng/ml Final   • RITALINIC ACID 05/02/2023 Negative  50 ng/ml ng/ml Final   • CARISOPRODOL 05/02/2023 Negative  100 ng/ml ng/ml Final   • GABAPENTIN 05/02/2023 Negative  500 ng/ml ng/ml Final   • PREGABALIN 05/02/2023 Negative  250 ng/ml ng/ml Final   • ZOLPIDEM 05/02/2023 Negative  2 ng/ml ng/ml Final   • CARBOXYZOLPIDEM 05/02/2023 Negative  10 ng/ml ng/ml Final   • PH 05/02/2023 8.8  4.5 - 9 Final   • CREATININE 05/02/2023 185.7  20 - 300 mg/dL mg/dL Final   • SPECIFIC GRAVITY 05/02/2023 1.021  1.003 - 1.035 Final       Mental Status Exam  Hygiene:  good  Dress: casual  Attitude: cooperative and agreeable   Motor Activity: appropriate  Eye Contact:  good  Speech: regular rate and rhythm   Mood:  calm and cooperative  Affect:  Appropriate  Thought Processes:  Linear  Thought Content:  Normal  Suicidal Thoughts:  denies  Homicidal Thoughts:  denies  Crisis Safety Plan: Safety plan has been discussed.   Hallucinations:  Unknown to clinician.   Reliability: fair  Insight: fair  Judgement: fair  Impulse Control:  fair    Recovery/spiritual support group attendance: No.      Progress toward goal: Not at goal    Prognosis: Fair with Ongoing Treatment     Self-reported number of days sober: Patient reported 54 days on check in form.     Patient will contact this office, call 911 or present to the nearest emergency room should suicidal or homicidal ideations occur.    Impression/Formulation:    ICD-10-CM ICD-9-CM   1. Opioid use disorder, severe, dependence  F11.20 304.00       Clinical Maneuvering/Interventions: Therapist utilized a person-centered approach to build rapport with group member. Therapist implemented motivational interviewing techniques to assist client with exploring and resolving ambivalence associated with commitment to change behaviors related to substance use and addiction. Therapist applied cognitive behavioral strategies to facilitate identification of maladaptive patterns of thinking and behavior that contribute to client's risk for continued substance use and relapse. Therapist employed group interaction activities to build rapport among group members, promote sobriety, and emphasize relapse prevention. Therapist promoted safe nonjudgmental environment by providing group members with unconditional positive regard and encouraging group members to comply with group rules and guidelines. Therapist assisted group member with identifying and implementing healthier coping strategies.      Plan:  Continue [facility] IOP Phase I   Aftercare:  [facility] Phase II  Program Assignments:  Personal recovery plan, relapse prevention plan, attendance of recovery support group meetings, exploration of sponsorship, drug/alcohol screens.     Ta Ambrose LCSW  5/18/2023  16:35 EDT

## 2023-05-22 ENCOUNTER — OFFICE VISIT (OUTPATIENT)
Dept: PSYCHIATRY | Facility: HOSPITAL | Age: 38
End: 2023-05-22
Payer: MEDICAID

## 2023-05-22 DIAGNOSIS — F11.20 OPIOID USE DISORDER, SEVERE, DEPENDENCE: Primary | ICD-10-CM

## 2023-05-22 PROCEDURE — H0015 ALCOHOL AND/OR DRUG SERVICES: HCPCS | Performed by: NURSE PRACTITIONER

## 2023-05-22 NOTE — PROGRESS NOTES
"[facility]     Date: 05/15/2023  Name: Taye Ahmadi    Time In: 1800   Time Out: 2040     Number of participants: 7.    IOP GROUP NOTE     Data: 3 hour IOP group therapy session (Check-ins, Coping Skills, Relapse Prevention)     Check Ins: Therapist continued facilitation of rapport building strategies between group members. Therapist asked that each patient check in with home life and recovery efforts and identify triggers, cravings, and high risk situations that arise between group sessions. Therapist provided empathy and support during group session.     Session Content/Coping Skills: Check ins completed by group members. Clinician discussed with group internal and external risk factors as well as internal and external strengths. Group members identified their top strengths and listed on their “recovery shield”. Group members shared their recovery shield with the group. Article “The 5 Rules of Recovery” began (retrieved from www.addictionsandrecovery.org/five-rules-of-recovery.htm).      Response: Patient attended class in person. Patient participated in completion of check in form. Patient on check in form answered no to question \"currently or since last group meeting have you had any suicidal thoughts or plan or intent to hurt yourself”, and patient also answered no on check in form to question of \"currently or since your last group meeting, have you had any homicidal thoughts or plan or intent to hurt others\".     Personal Assessment 0-10 Scale (0-none, 10-high)    Anxiety:  2   Depression:  1   Cravings: 1     Assessment:     ..  Lab on 05/09/2023   Component Date Value Ref Range Status   • THC, Screen, Urine 05/09/2023 Negative  Negative Final   • Phencyclidine (PCP), Urine 05/09/2023 Negative  Negative Final   • Cocaine Screen, Urine 05/09/2023 Negative  Negative Final   • Methamphetamine, Ur 05/09/2023 Negative  Negative Final   • Opiate Screen 05/09/2023 Negative  Negative Final   • Amphetamine " Screen, Urine 05/09/2023 Negative  Negative Final   • Benzodiazepine Screen, Urine 05/09/2023 Negative  Negative Final   • Tricyclic Antidepressants Screen 05/09/2023 Negative  Negative Final   • Methadone Screen, Urine 05/09/2023 Negative  Negative Final   • Barbiturates Screen, Urine 05/09/2023 Negative  Negative Final   • Oxycodone Screen, Urine 05/09/2023 Negative  Negative Final   • Propoxyphene Screen 05/09/2023 Negative  Negative Final   • Buprenorphine, Screen, Urine 05/09/2023 Negative  Negative Final   • CODEINE 05/09/2023 Negative  50 ng/ml ng/ml Final   • HYDROCODONE 05/09/2023 Negative  50 ng/ml ng/ml Final   • NORHYDROCODONE 05/09/2023 Negative  50 ng/ml ng/ml Final   • HYDROMORPHONE 05/09/2023 Negative  50 ng/ml ng/ml Final   • MORPHINE 05/09/2023 Negative  50 ng/ml ng/ml Final   • FENTANYL 05/09/2023 Negative  2 ng/ml ng/ml Final   • NORFENTANYL 05/09/2023 Negative  10 ng/ml ng/ml Final   • METHADONE 05/09/2023 Negative  25 ng/ml ng/ml Final   • EDDP 05/09/2023 Negative  50 ng/ml ng/ml Final   • OXYCODONE 05/09/2023 Negative  50 ng/ml ng/ml Final   • NOROXYCODONE 05/09/2023 Negative  50 ng/ml ng/ml Final   • OXYMORPHONE 05/09/2023 Negative  50 ng/ml ng/ml Final   • TAPENTADOL 05/09/2023 Negative  50 ng/ml ng/ml Final   • TRAMADOL 05/09/2023 Negative  50 ng/ml ng/ml Final   • BUPRENORPHINE 05/09/2023 Negative  7.5 ng/ml ng/ml Final   • NORBUPRENORPHINE 05/09/2023 Negative  10 ng/ml ng/ml Final   • AMOBARBITAL 05/09/2023 Negative  100 ng/ml ng/ml Final   • BUTABARBITAL 05/09/2023 Negative  100 ng/ml ng/ml Final   • BUTALBITAL 05/09/2023 Negative  100 ng/ml ng/ml Final   • PHENOBARBITAL 05/09/2023 Negative  100 ng/ml ng/ml Final   • SECOBARBITAL 05/09/2023 Negative  100 ng/ml ng/ml Final   • ALPRAZOLAM 05/09/2023 Negative  50 ng/ml ng/ml Final   • ALPHA-HYDROXYALPRAZOLAM 05/09/2023 Negative  50 ng/ml ng/ml Final   • CLONAZEPAM 05/09/2023 Negative  50 ng/ml ng/ml Final   • 7- AMINOCLONAZEPAM 05/09/2023  Negative  50 ng/ml ng/ml Final   • DIAZEPAM 05/09/2023 Negative  50 ng/ml ng/ml Final   • NORDIAZEPAM 05/09/2023 Negative  50 ng/ml ng/ml Final   • LORAZEPAM 05/09/2023 Negative  100 ng/ml ng/ml Final   • MIDAZOLAM 05/09/2023 Negative  50 ng/ml ng/ml Final   • ALPHA-HYDROXYMIDAZOLAM 05/09/2023 Negative  50 ng/ml ng/ml Final   • OXAZEPAM 05/09/2023 Negative  50 ng/ml ng/ml Final   • TEMAZEPAM 05/09/2023 Negative  50 ng/ml ng/ml Final   • ETG 05/09/2023 Negative  200 ng/ml ng/ml Final   • BENZOYLECGONINE 05/09/2023 Negative  100 ng/ml ng/ml Final   • 6-JEREMIAS 05/09/2023 Negative  10 ng/ml ng/ml Final   • MDMA 05/09/2023 Negative  100 ng/ml ng/ml Final   • PCP 05/09/2023 Negative  20 ng/ml ng/ml Final   • DELTA-9-THC 05/09/2023 Negative  20 ng/ml ng/ml Final   • AMPHETAMINE 05/09/2023 Negative  250 ng/ml ng/ml Final   • METHAMPHETAMINE 05/09/2023 Negative  100 ng/ml ng/ml Final   • METHYLPHENIDATE 05/09/2023 Negative  10 ng/ml ng/ml Final   • PHENTERMINE 05/09/2023 Negative  100 ng/ml ng/ml Final   • RITALINIC ACID 05/09/2023 Negative  50 ng/ml ng/ml Final   • CARISOPRODOL 05/09/2023 Negative  100 ng/ml ng/ml Final   • GABAPENTIN 05/09/2023 Negative  500 ng/ml ng/ml Final   • PREGABALIN 05/09/2023 Negative  250 ng/ml ng/ml Final   • ZOLPIDEM 05/09/2023 Negative  2 ng/ml ng/ml Final   • CARBOXYZOLPIDEM 05/09/2023 Negative  10 ng/ml ng/ml Final   • PH 05/09/2023 8.3  4.5 - 9 Final   • CREATININE 05/09/2023 21.7  20 - 300 mg/dL mg/dL Final   • SPECIFIC GRAVITY 05/09/2023 1.002  1.003 - 1.035 Final   Lab on 05/02/2023   Component Date Value Ref Range Status   • THC, Screen, Urine 05/02/2023 Negative  Negative Final   • Phencyclidine (PCP), Urine 05/02/2023 Negative  Negative Final   • Cocaine Screen, Urine 05/02/2023 Negative  Negative Final   • Methamphetamine, Ur 05/02/2023 Negative  Negative Final   • Opiate Screen 05/02/2023 Negative  Negative Final   • Amphetamine Screen, Urine 05/02/2023 Negative  Negative Final   •  Benzodiazepine Screen, Urine 05/02/2023 Negative  Negative Final   • Tricyclic Antidepressants Screen 05/02/2023 Negative  Negative Final   • Methadone Screen, Urine 05/02/2023 Negative  Negative Final   • Barbiturates Screen, Urine 05/02/2023 Negative  Negative Final   • Oxycodone Screen, Urine 05/02/2023 Negative  Negative Final   • Propoxyphene Screen 05/02/2023 Negative  Negative Final   • Buprenorphine, Screen, Urine 05/02/2023 Negative  Negative Final   • CODEINE 05/02/2023 Negative  50 ng/ml ng/ml Final   • HYDROCODONE 05/02/2023 Negative  50 ng/ml ng/ml Final   • NORHYDROCODONE 05/02/2023 Negative  50 ng/ml ng/ml Final   • HYDROMORPHONE 05/02/2023 Negative  50 ng/ml ng/ml Final   • MORPHINE 05/02/2023 Negative  50 ng/ml ng/ml Final   • FENTANYL 05/02/2023 Negative  2 ng/ml ng/ml Final   • NORFENTANYL 05/02/2023 Negative  10 ng/ml ng/ml Final   • METHADONE 05/02/2023 Negative  25 ng/ml ng/ml Final   • EDDP 05/02/2023 Negative  50 ng/ml ng/ml Final   • OXYCODONE 05/02/2023 Negative  50 ng/ml ng/ml Final   • NOROXYCODONE 05/02/2023 Negative  50 ng/ml ng/ml Final   • OXYMORPHONE 05/02/2023 Negative  50 ng/ml ng/ml Final   • TAPENTADOL 05/02/2023 Negative  50 ng/ml ng/ml Final   • TRAMADOL 05/02/2023 Negative  50 ng/ml ng/ml Final   • BUPRENORPHINE 05/02/2023 Negative  7.5 ng/ml ng/ml Final   • NORBUPRENORPHINE 05/02/2023 Negative  10 ng/ml ng/ml Final   • AMOBARBITAL 05/02/2023 Negative  100 ng/ml ng/ml Final   • BUTABARBITAL 05/02/2023 Negative  100 ng/ml ng/ml Final   • BUTALBITAL 05/02/2023 Negative  100 ng/ml ng/ml Final   • PHENOBARBITAL 05/02/2023 Negative  100 ng/ml ng/ml Final   • SECOBARBITAL 05/02/2023 Negative  100 ng/ml ng/ml Final   • ALPRAZOLAM 05/02/2023 Negative  50 ng/ml ng/ml Final   • ALPHA-HYDROXYALPRAZOLAM 05/02/2023 Negative  50 ng/ml ng/ml Final   • CLONAZEPAM 05/02/2023 Negative  50 ng/ml ng/ml Final   • 7- AMINOCLONAZEPAM 05/02/2023 Negative  50 ng/ml ng/ml Final   • DIAZEPAM 05/02/2023  Negative  50 ng/ml ng/ml Final   • NORDIAZEPAM 05/02/2023 Negative  50 ng/ml ng/ml Final   • LORAZEPAM 05/02/2023 Negative  100 ng/ml ng/ml Final   • MIDAZOLAM 05/02/2023 Negative  50 ng/ml ng/ml Final   • ALPHA-HYDROXYMIDAZOLAM 05/02/2023 Negative  50 ng/ml ng/ml Final   • OXAZEPAM 05/02/2023 Negative  50 ng/ml ng/ml Final   • TEMAZEPAM 05/02/2023 Negative  50 ng/ml ng/ml Final   • ETG 05/02/2023 Negative  200 ng/ml ng/ml Final   • BENZOYLECGONINE 05/02/2023 Negative  100 ng/ml ng/ml Final   • 6-JEREMIAS 05/02/2023 Negative  10 ng/ml ng/ml Final   • MDMA 05/02/2023 Negative  100 ng/ml ng/ml Final   • PCP 05/02/2023 Negative  20 ng/ml ng/ml Final   • DELTA-9-THC 05/02/2023 Negative  20 ng/ml ng/ml Final   • AMPHETAMINE 05/02/2023 Negative  250 ng/ml ng/ml Final   • METHAMPHETAMINE 05/02/2023 Negative  100 ng/ml ng/ml Final   • METHYLPHENIDATE 05/02/2023 Negative  10 ng/ml ng/ml Final   • PHENTERMINE 05/02/2023 Negative  100 ng/ml ng/ml Final   • RITALINIC ACID 05/02/2023 Negative  50 ng/ml ng/ml Final   • CARISOPRODOL 05/02/2023 Negative  100 ng/ml ng/ml Final   • GABAPENTIN 05/02/2023 Negative  500 ng/ml ng/ml Final   • PREGABALIN 05/02/2023 Negative  250 ng/ml ng/ml Final   • ZOLPIDEM 05/02/2023 Negative  2 ng/ml ng/ml Final   • CARBOXYZOLPIDEM 05/02/2023 Negative  10 ng/ml ng/ml Final   • PH 05/02/2023 8.8  4.5 - 9 Final   • CREATININE 05/02/2023 185.7  20 - 300 mg/dL mg/dL Final   • SPECIFIC GRAVITY 05/02/2023 1.021  1.003 - 1.035 Final       Mental Status Exam  Hygiene:  good  Dress: casual  Attitude: cooperative and agreeable   Motor Activity: appropriate  Eye Contact:  good  Speech: regular rate and rhythm   Mood:  calm and cooperative  Affect:  Appropriate  Thought Processes:  Linear  Thought Content:  Normal  Suicidal Thoughts:  denies  Homicidal Thoughts:  denies  Crisis Safety Plan: Safety plan has been discussed.   Hallucinations:  Unknown to clinician.   Reliability: fair  Insight: fair  Judgement:  fair  Impulse Control: fair    Recovery/spiritual support group attendance: No.      Progress toward goal: Not at goal    Prognosis: Fair with Ongoing Treatment     Self-reported number of days sober: Patient reported 59 days on check in form.     Patient will contact this office, call 911 or present to the nearest emergency room should suicidal or homicidal ideations occur.    Impression/Formulation:    ICD-10-CM ICD-9-CM   1. Opioid use disorder, severe, dependence  F11.20 304.00       Clinical Maneuvering/Interventions: Therapist utilized a person-centered approach to build rapport with group member. Therapist implemented motivational interviewing techniques to assist client with exploring and resolving ambivalence associated with commitment to change behaviors related to substance use and addiction. Therapist applied cognitive behavioral strategies to facilitate identification of maladaptive patterns of thinking and behavior that contribute to client's risk for continued substance use and relapse. Therapist employed group interaction activities to build rapport among group members, promote sobriety, and emphasize relapse prevention. Therapist promoted safe nonjudgmental environment by providing group members with unconditional positive regard and encouraging group members to comply with group rules and guidelines. Therapist assisted group member with identifying and implementing healthier coping strategies.      Plan:  Continue [facility] IOP Phase I   Aftercare:  [facility] Phase II  Program Assignments:  Personal recovery plan, relapse prevention plan, attendance of recovery support group meetings, exploration of sponsorship, drug/alcohol screens.     Ta Ambrose LCSW  5/22/2023  17:03 EDT

## 2023-05-23 ENCOUNTER — LAB (OUTPATIENT)
Dept: LAB | Facility: HOSPITAL | Age: 38
End: 2023-05-23
Payer: MEDICAID

## 2023-05-23 DIAGNOSIS — F11.20 OPIOID USE DISORDER, SEVERE, DEPENDENCE: ICD-10-CM

## 2023-05-23 LAB
1OH-MIDAZOLAM UR CFM-MCNC: NEGATIVE NG/ML
6MAM UR CFM-MCNC: NEGATIVE NG/ML
7AMINOCLONAZEPAM UR CFM-MCNC: NEGATIVE NG/ML
7AMINOCLONAZEPAM UR CFM-MCNC: NEGATIVE NG/ML
A-OH ALPRAZ UR CFM-MCNC: NEGATIVE NG/ML
ALPRAZ UR CFM-MCNC: NEGATIVE NG/ML
AMOBARBITAL UR CFM-MCNC: NEGATIVE NG/ML
AMPHET UR CFM-MCNC: NEGATIVE NG/ML
AMPHET+METHAMPHET UR QL: NEGATIVE
AMPHETAMINES UR QL: NEGATIVE
BARBITURATES UR QL SCN: NEGATIVE
BENZODIAZ UR QL SCN: NEGATIVE
BUPRENORPHINE SERPL-MCNC: NEGATIVE NG/ML
BUPRENORPHINE UR-MCNC: NEGATIVE NG/ML
BUTABARBITAL UR CFM-MCNC: NEGATIVE NG/ML
BUTALBITAL UR CFM-MCNC: NEGATIVE NG/ML
BZE UR CFM-MCNC: NEGATIVE NG/ML
CANNABINOIDS SERPL QL: NEGATIVE
CARBOXYTHC UR CFM-MCNC: NEGATIVE NG/ML
CARISOPRODOL UR CFM-MCNC: NEGATIVE NG/ML
COCAINE UR QL: NEGATIVE
CODEINE UR CFM-MCNC: NEGATIVE NG/ML
CREATININE: 93.5 MG/DL
DIAZEPAM UR CFM-MCNC: NEGATIVE NG/ML
EDDP UR CFM-MCNC: NEGATIVE NG/ML
ETHYL GLUCURONIDE UR CFM-MCNC: NEGATIVE NG/ML
FENTANYL UR-MCNC: NEGATIVE NG/ML
GABAPENTIN UR CFM-MCNC: NEGATIVE NG/ML
HYDROCODONE UR CFM-MCNC: NEGATIVE NG/ML
HYDROMORPHONE UR CFM-MCNC: NEGATIVE NG/ML
LORAZEPAM UR CFM-MCNC: NEGATIVE NG/ML
MDMA UR CFM-MCNC: NEGATIVE NG/ML
ME-PHENIDATE UR CFM-MCNC: NEGATIVE NG/ML
METHADONE UR CFM-MCNC: NEGATIVE NG/ML
METHADONE UR QL SCN: NEGATIVE
METHAMPHET UR CFM-MCNC: NEGATIVE NG/ML
MIDAZOLAM UR CFM-MCNC: NEGATIVE NG/ML
MORPHINE UR CFM-MCNC: NEGATIVE NG/ML
NORBUPRENORPHINE UR CFM-MCNC: NEGATIVE NG/ML
NORDIAZEPAM UR CFM-MCNC: NEGATIVE NG/ML
NORFENTANYL UR CFM-MCNC: NEGATIVE NG/ML
NORHYDROCODONE UR CFM-MCNC: NEGATIVE NG/ML
NOROXYCODONE UR CFM-MCNC: NEGATIVE NG/ML
OPIATES UR QL: NEGATIVE
OXAZEPAM CTO UR CFM-MCNC: NEGATIVE NG/ML
OXYCODONE SERPL-MCNC: NEGATIVE NG/ML
OXYCODONE UR QL SCN: NEGATIVE
OXYMORPHONE SERPL-MCNC: NEGATIVE NG/ML
PCP UR CFM-MCNC: NEGATIVE NG/ML
PCP UR QL SCN: NEGATIVE
PH: 8.7 (ref 4.5–9)
PHENOBARB UR CFM-MCNC: NEGATIVE NG/ML
PHENTERMINE: NEGATIVE NG/ML
PPAA UR CFM-MCNC: NEGATIVE NG/ML
PREGABALIN UR CFM-MCNC: NEGATIVE NG/ML
PROPOXYPH UR QL: NEGATIVE
SECOBARBITAL UR CFM-MCNC: NEGATIVE NG/ML
SPECIFIC GRAVITY: 1.01 (ref 1–1.03)
TAPENTADOL UR CFM-MCNC: NEGATIVE NG/ML
TEMAZEPAM UR CFM-MCNC: NEGATIVE NG/ML
TRAMADOL: NEGATIVE NG/ML
TRICYCLICS UR QL SCN: NEGATIVE
ZOLPIDEM PHENYL-4-CARB UR CFM-MCNC: NEGATIVE NG/ML
ZOLPIDEM UR CFM-MCNC: NEGATIVE NG/ML

## 2023-05-23 PROCEDURE — 80306 DRUG TEST PRSMV INSTRMNT: CPT

## 2023-05-24 ENCOUNTER — OFFICE VISIT (OUTPATIENT)
Dept: PSYCHIATRY | Facility: HOSPITAL | Age: 38
End: 2023-05-24
Payer: MEDICAID

## 2023-05-24 DIAGNOSIS — F11.20 OPIOID USE DISORDER, SEVERE, DEPENDENCE: Primary | ICD-10-CM

## 2023-05-24 PROCEDURE — H0015 ALCOHOL AND/OR DRUG SERVICES: HCPCS | Performed by: NURSE PRACTITIONER

## 2023-05-24 NOTE — PROGRESS NOTES
"[facility] IOP GROUP     Date: 05/22/2023  Name: Taye Ahmadi    Time In: 1800   Time Out: 2100     Number of participants: 9.    IOP GROUP NOTE     Data: 3 hour IOP group therapy session (Check-ins, Coping Skills, Relapse Prevention)     Check Ins: Therapist continued facilitation of rapport building strategies between group members. Therapist asked that each patient check in with home life and recovery efforts and identify triggers, cravings, and high risk situations that arise between group sessions. Therapist provided empathy and support during group session.     Session Content/Coping Skills: Check ins completed by group members. Laquita “Facts about Addiction” psychoeducational material reviewed and discussed. Clinician discussed with group members biological, psychological, and social factors that may contribute to development of addiction. Clinician reviewed with group members substance use disorder diagnostic criteria and group members completed a self-assessment. YouTube video “The Neurobiology of Addiction Addiction 101 in [facility]” viewed ([facility]). Group members answered discussion questions related to video.      Response: Patient attended class in person. Patient participated in completion of check in form. Patient on check in form answered no to question \"currently or since last group meeting have you had any suicidal thoughts or plan or intent to hurt yourself”, and patient also answered no on check in form to question of \"currently or since your last group meeting, have you had any homicidal thoughts or plan or intent to hurt others\".     Personal Assessment 0-10 Scale (0-none, 10-high)    Anxiety:  2   Depression:  0   Cravings: 1     Assessment:     ..  Lab on 05/16/2023   Component Date Value Ref Range Status   • THC, Screen, Urine 05/16/2023 Negative  Negative Final   • Phencyclidine (PCP), Urine 05/16/2023 Negative  Negative Final   • Cocaine Screen, Urine 05/16/2023 Negative  " Negative Final   • Methamphetamine, Ur 05/16/2023 Negative  Negative Final   • Opiate Screen 05/16/2023 Negative  Negative Final   • Amphetamine Screen, Urine 05/16/2023 Negative  Negative Final   • Benzodiazepine Screen, Urine 05/16/2023 Negative  Negative Final   • Tricyclic Antidepressants Screen 05/16/2023 Negative  Negative Final   • Methadone Screen, Urine 05/16/2023 Negative  Negative Final   • Barbiturates Screen, Urine 05/16/2023 Negative  Negative Final   • Oxycodone Screen, Urine 05/16/2023 Negative  Negative Final   • Propoxyphene Screen 05/16/2023 Negative  Negative Final   • Buprenorphine, Screen, Urine 05/16/2023 Negative  Negative Final   • PH 05/16/2023 8.7  4.5 - 9 Final   • CREATININE 05/16/2023 93.5  20 - 300 mg/dL mg/dL Final   • SPECIFIC GRAVITY 05/16/2023 1.014  1.003 - 1.035 Final   • CODEINE 05/16/2023 Negative  50 ng/ml ng/ml Final   • HYDROCODONE 05/16/2023 Negative  50 ng/ml ng/ml Final   • NORHYDROCODONE 05/16/2023 Negative  50 ng/ml ng/ml Final   • HYDROMORPHONE 05/16/2023 Negative  50 ng/ml ng/ml Final   • MORPHINE 05/16/2023 Negative  50 ng/ml ng/ml Final   • FENTANYL 05/16/2023 Negative  2 ng/ml ng/ml Final   • NORFENTANYL 05/16/2023 Negative  10 ng/ml ng/ml Final   • METHADONE 05/16/2023 Negative  25 ng/ml ng/ml Final   • EDDP 05/16/2023 Negative  50 ng/ml ng/ml Final   • OXYCODONE 05/16/2023 Negative  50 ng/ml ng/ml Final   • NOROXYCODONE 05/16/2023 Negative  50 ng/ml ng/ml Final   • OXYMORPHONE 05/16/2023 Negative  50 ng/ml ng/ml Final   • TAPENTADOL 05/16/2023 Negative  50 ng/ml ng/ml Final   • TRAMADOL 05/16/2023 Negative  50 ng/ml ng/ml Final   • BUPRENORPHINE 05/16/2023 Negative  7.5 ng/ml ng/ml Final   • NORBUPRENORPHINE 05/16/2023 Negative  10 ng/ml ng/ml Final   • AMOBARBITAL 05/16/2023 Negative  100 ng/ml ng/ml Final   • BUTABARBITAL 05/16/2023 Negative  100 ng/ml ng/ml Final   • BUTALBITAL 05/16/2023 Negative  100 ng/ml ng/ml Final   • PHENOBARBITAL 05/16/2023 Negative   100 ng/ml ng/ml Final   • SECOBARBITAL 05/16/2023 Negative  100 ng/ml ng/ml Final   • ALPRAZOLAM 05/16/2023 Negative  50 ng/ml ng/ml Final   • ALPHA-HYDROXYALPRAZOLAM 05/16/2023 Negative  50 ng/ml ng/ml Final   • CLONAZEPAM 05/16/2023 Negative  50 ng/ml ng/ml Final   • 7- AMINOCLONAZEPAM 05/16/2023 Negative  50 ng/ml ng/ml Final   • DIAZEPAM 05/16/2023 Negative  50 ng/ml ng/ml Final   • NORDIAZEPAM 05/16/2023 Negative  50 ng/ml ng/ml Final   • LORAZEPAM 05/16/2023 Negative  100 ng/ml ng/ml Final   • MIDAZOLAM 05/16/2023 Negative  50 ng/ml ng/ml Final   • ALPHA-HYDROXYMIDAZOLAM 05/16/2023 Negative  50 ng/ml ng/ml Final   • OXAZEPAM 05/16/2023 Negative  50 ng/ml ng/ml Final   • TEMAZEPAM 05/16/2023 Negative  50 ng/ml ng/ml Final   • ETG 05/16/2023 Negative  200 ng/ml ng/ml Final   • BENZOYLECGONINE 05/16/2023 Negative  100 ng/ml ng/ml Final   • 6-JEREMIAS 05/16/2023 Negative  10 ng/ml ng/ml Final   • MDMA 05/16/2023 Negative  100 ng/ml ng/ml Final   • PCP 05/16/2023 Negative  20 ng/ml ng/ml Final   • DELTA-9-THC 05/16/2023 Negative  20 ng/ml ng/ml Final   • AMPHETAMINE 05/16/2023 Negative  250 ng/ml ng/ml Final   • METHAMPHETAMINE 05/16/2023 Negative  100 ng/ml ng/ml Final   • METHYLPHENIDATE 05/16/2023 Negative  10 ng/ml ng/ml Final   • PHENTERMINE 05/16/2023 Negative  100 ng/ml ng/ml Final   • RITALINIC ACID 05/16/2023 Negative  50 ng/ml ng/ml Final   • CARISOPRODOL 05/16/2023 Negative  100 ng/ml ng/ml Final   • GABAPENTIN 05/16/2023 Negative  500 ng/ml ng/ml Final   • PREGABALIN 05/16/2023 Negative  250 ng/ml ng/ml Final   • ZOLPIDEM 05/16/2023 Negative  2 ng/ml ng/ml Final   • CARBOXYZOLPIDEM 05/16/2023 Negative  10 ng/ml ng/ml Final   Lab on 05/09/2023   Component Date Value Ref Range Status   • THC, Screen, Urine 05/09/2023 Negative  Negative Final   • Phencyclidine (PCP), Urine 05/09/2023 Negative  Negative Final   • Cocaine Screen, Urine 05/09/2023 Negative  Negative Final   • Methamphetamine, Ur 05/09/2023  Negative  Negative Final   • Opiate Screen 05/09/2023 Negative  Negative Final   • Amphetamine Screen, Urine 05/09/2023 Negative  Negative Final   • Benzodiazepine Screen, Urine 05/09/2023 Negative  Negative Final   • Tricyclic Antidepressants Screen 05/09/2023 Negative  Negative Final   • Methadone Screen, Urine 05/09/2023 Negative  Negative Final   • Barbiturates Screen, Urine 05/09/2023 Negative  Negative Final   • Oxycodone Screen, Urine 05/09/2023 Negative  Negative Final   • Propoxyphene Screen 05/09/2023 Negative  Negative Final   • Buprenorphine, Screen, Urine 05/09/2023 Negative  Negative Final   • CODEINE 05/09/2023 Negative  50 ng/ml ng/ml Final   • HYDROCODONE 05/09/2023 Negative  50 ng/ml ng/ml Final   • NORHYDROCODONE 05/09/2023 Negative  50 ng/ml ng/ml Final   • HYDROMORPHONE 05/09/2023 Negative  50 ng/ml ng/ml Final   • MORPHINE 05/09/2023 Negative  50 ng/ml ng/ml Final   • FENTANYL 05/09/2023 Negative  2 ng/ml ng/ml Final   • NORFENTANYL 05/09/2023 Negative  10 ng/ml ng/ml Final   • METHADONE 05/09/2023 Negative  25 ng/ml ng/ml Final   • EDDP 05/09/2023 Negative  50 ng/ml ng/ml Final   • OXYCODONE 05/09/2023 Negative  50 ng/ml ng/ml Final   • NOROXYCODONE 05/09/2023 Negative  50 ng/ml ng/ml Final   • OXYMORPHONE 05/09/2023 Negative  50 ng/ml ng/ml Final   • TAPENTADOL 05/09/2023 Negative  50 ng/ml ng/ml Final   • TRAMADOL 05/09/2023 Negative  50 ng/ml ng/ml Final   • BUPRENORPHINE 05/09/2023 Negative  7.5 ng/ml ng/ml Final   • NORBUPRENORPHINE 05/09/2023 Negative  10 ng/ml ng/ml Final   • AMOBARBITAL 05/09/2023 Negative  100 ng/ml ng/ml Final   • BUTABARBITAL 05/09/2023 Negative  100 ng/ml ng/ml Final   • BUTALBITAL 05/09/2023 Negative  100 ng/ml ng/ml Final   • PHENOBARBITAL 05/09/2023 Negative  100 ng/ml ng/ml Final   • SECOBARBITAL 05/09/2023 Negative  100 ng/ml ng/ml Final   • ALPRAZOLAM 05/09/2023 Negative  50 ng/ml ng/ml Final   • ALPHA-HYDROXYALPRAZOLAM 05/09/2023 Negative  50 ng/ml ng/ml  Final   • CLONAZEPAM 05/09/2023 Negative  50 ng/ml ng/ml Final   • 7- AMINOCLONAZEPAM 05/09/2023 Negative  50 ng/ml ng/ml Final   • DIAZEPAM 05/09/2023 Negative  50 ng/ml ng/ml Final   • NORDIAZEPAM 05/09/2023 Negative  50 ng/ml ng/ml Final   • LORAZEPAM 05/09/2023 Negative  100 ng/ml ng/ml Final   • MIDAZOLAM 05/09/2023 Negative  50 ng/ml ng/ml Final   • ALPHA-HYDROXYMIDAZOLAM 05/09/2023 Negative  50 ng/ml ng/ml Final   • OXAZEPAM 05/09/2023 Negative  50 ng/ml ng/ml Final   • TEMAZEPAM 05/09/2023 Negative  50 ng/ml ng/ml Final   • ETG 05/09/2023 Negative  200 ng/ml ng/ml Final   • BENZOYLECGONINE 05/09/2023 Negative  100 ng/ml ng/ml Final   • 6-JEREMIAS 05/09/2023 Negative  10 ng/ml ng/ml Final   • MDMA 05/09/2023 Negative  100 ng/ml ng/ml Final   • PCP 05/09/2023 Negative  20 ng/ml ng/ml Final   • DELTA-9-THC 05/09/2023 Negative  20 ng/ml ng/ml Final   • AMPHETAMINE 05/09/2023 Negative  250 ng/ml ng/ml Final   • METHAMPHETAMINE 05/09/2023 Negative  100 ng/ml ng/ml Final   • METHYLPHENIDATE 05/09/2023 Negative  10 ng/ml ng/ml Final   • PHENTERMINE 05/09/2023 Negative  100 ng/ml ng/ml Final   • RITALINIC ACID 05/09/2023 Negative  50 ng/ml ng/ml Final   • CARISOPRODOL 05/09/2023 Negative  100 ng/ml ng/ml Final   • GABAPENTIN 05/09/2023 Negative  500 ng/ml ng/ml Final   • PREGABALIN 05/09/2023 Negative  250 ng/ml ng/ml Final   • ZOLPIDEM 05/09/2023 Negative  2 ng/ml ng/ml Final   • CARBOXYZOLPIDEM 05/09/2023 Negative  10 ng/ml ng/ml Final   • PH 05/09/2023 8.3  4.5 - 9 Final   • CREATININE 05/09/2023 21.7  20 - 300 mg/dL mg/dL Final   • SPECIFIC GRAVITY 05/09/2023 1.002  1.003 - 1.035 Final   Lab on 05/02/2023   Component Date Value Ref Range Status   • THC, Screen, Urine 05/02/2023 Negative  Negative Final   • Phencyclidine (PCP), Urine 05/02/2023 Negative  Negative Final   • Cocaine Screen, Urine 05/02/2023 Negative  Negative Final   • Methamphetamine, Ur 05/02/2023 Negative  Negative Final   • Opiate Screen 05/02/2023  Negative  Negative Final   • Amphetamine Screen, Urine 05/02/2023 Negative  Negative Final   • Benzodiazepine Screen, Urine 05/02/2023 Negative  Negative Final   • Tricyclic Antidepressants Screen 05/02/2023 Negative  Negative Final   • Methadone Screen, Urine 05/02/2023 Negative  Negative Final   • Barbiturates Screen, Urine 05/02/2023 Negative  Negative Final   • Oxycodone Screen, Urine 05/02/2023 Negative  Negative Final   • Propoxyphene Screen 05/02/2023 Negative  Negative Final   • Buprenorphine, Screen, Urine 05/02/2023 Negative  Negative Final   • CODEINE 05/02/2023 Negative  50 ng/ml ng/ml Final   • HYDROCODONE 05/02/2023 Negative  50 ng/ml ng/ml Final   • NORHYDROCODONE 05/02/2023 Negative  50 ng/ml ng/ml Final   • HYDROMORPHONE 05/02/2023 Negative  50 ng/ml ng/ml Final   • MORPHINE 05/02/2023 Negative  50 ng/ml ng/ml Final   • FENTANYL 05/02/2023 Negative  2 ng/ml ng/ml Final   • NORFENTANYL 05/02/2023 Negative  10 ng/ml ng/ml Final   • METHADONE 05/02/2023 Negative  25 ng/ml ng/ml Final   • EDDP 05/02/2023 Negative  50 ng/ml ng/ml Final   • OXYCODONE 05/02/2023 Negative  50 ng/ml ng/ml Final   • NOROXYCODONE 05/02/2023 Negative  50 ng/ml ng/ml Final   • OXYMORPHONE 05/02/2023 Negative  50 ng/ml ng/ml Final   • TAPENTADOL 05/02/2023 Negative  50 ng/ml ng/ml Final   • TRAMADOL 05/02/2023 Negative  50 ng/ml ng/ml Final   • BUPRENORPHINE 05/02/2023 Negative  7.5 ng/ml ng/ml Final   • NORBUPRENORPHINE 05/02/2023 Negative  10 ng/ml ng/ml Final   • AMOBARBITAL 05/02/2023 Negative  100 ng/ml ng/ml Final   • BUTABARBITAL 05/02/2023 Negative  100 ng/ml ng/ml Final   • BUTALBITAL 05/02/2023 Negative  100 ng/ml ng/ml Final   • PHENOBARBITAL 05/02/2023 Negative  100 ng/ml ng/ml Final   • SECOBARBITAL 05/02/2023 Negative  100 ng/ml ng/ml Final   • ALPRAZOLAM 05/02/2023 Negative  50 ng/ml ng/ml Final   • ALPHA-HYDROXYALPRAZOLAM 05/02/2023 Negative  50 ng/ml ng/ml Final   • CLONAZEPAM 05/02/2023 Negative  50 ng/ml ng/ml  Final   • 7- AMINOCLONAZEPAM 05/02/2023 Negative  50 ng/ml ng/ml Final   • DIAZEPAM 05/02/2023 Negative  50 ng/ml ng/ml Final   • NORDIAZEPAM 05/02/2023 Negative  50 ng/ml ng/ml Final   • LORAZEPAM 05/02/2023 Negative  100 ng/ml ng/ml Final   • MIDAZOLAM 05/02/2023 Negative  50 ng/ml ng/ml Final   • ALPHA-HYDROXYMIDAZOLAM 05/02/2023 Negative  50 ng/ml ng/ml Final   • OXAZEPAM 05/02/2023 Negative  50 ng/ml ng/ml Final   • TEMAZEPAM 05/02/2023 Negative  50 ng/ml ng/ml Final   • ETG 05/02/2023 Negative  200 ng/ml ng/ml Final   • BENZOYLECGONINE 05/02/2023 Negative  100 ng/ml ng/ml Final   • 6-JEREMIAS 05/02/2023 Negative  10 ng/ml ng/ml Final   • MDMA 05/02/2023 Negative  100 ng/ml ng/ml Final   • PCP 05/02/2023 Negative  20 ng/ml ng/ml Final   • DELTA-9-THC 05/02/2023 Negative  20 ng/ml ng/ml Final   • AMPHETAMINE 05/02/2023 Negative  250 ng/ml ng/ml Final   • METHAMPHETAMINE 05/02/2023 Negative  100 ng/ml ng/ml Final   • METHYLPHENIDATE 05/02/2023 Negative  10 ng/ml ng/ml Final   • PHENTERMINE 05/02/2023 Negative  100 ng/ml ng/ml Final   • RITALINIC ACID 05/02/2023 Negative  50 ng/ml ng/ml Final   • CARISOPRODOL 05/02/2023 Negative  100 ng/ml ng/ml Final   • GABAPENTIN 05/02/2023 Negative  500 ng/ml ng/ml Final   • PREGABALIN 05/02/2023 Negative  250 ng/ml ng/ml Final   • ZOLPIDEM 05/02/2023 Negative  2 ng/ml ng/ml Final   • CARBOXYZOLPIDEM 05/02/2023 Negative  10 ng/ml ng/ml Final   • PH 05/02/2023 8.8  4.5 - 9 Final   • CREATININE 05/02/2023 185.7  20 - 300 mg/dL mg/dL Final   • SPECIFIC GRAVITY 05/02/2023 1.021  1.003 - 1.035 Final       Mental Status Exam  Hygiene:  good  Dress: casual  Attitude: cooperative and agreeable   Motor Activity: appropriate  Eye Contact:  good  Speech: regular rate and rhythm   Mood:  calm and cooperative  Affect:  Appropriate  Thought Processes:  Linear  Thought Content:  Normal  Suicidal Thoughts:  denies  Homicidal Thoughts:  denies  Crisis Safety Plan: Safety plan has been discussed.    Hallucinations:  Unknown to clinician.   Reliability: fair  Insight: fair  Judgement: fair  Impulse Control: fair    Recovery/spiritual support group attendance: No.      Progress toward goal: Not at goal    Prognosis: Fair with Ongoing Treatment     Self-reported number of days sober: Patient reported 67 on check in form.     Patient will contact this office, call 911 or present to the nearest emergency room should suicidal or homicidal ideations occur.    Impression/Formulation:    ICD-10-CM ICD-9-CM   1. Opioid use disorder, severe, dependence  F11.20 304.00       Clinical Maneuvering/Interventions: Therapist utilized a person-centered approach to build rapport with group member. Therapist implemented motivational interviewing techniques to assist client with exploring and resolving ambivalence associated with commitment to change behaviors related to substance use and addiction. Therapist applied cognitive behavioral strategies to facilitate identification of maladaptive patterns of thinking and behavior that contribute to client's risk for continued substance use and relapse. Therapist employed group interaction activities to build rapport among group members, promote sobriety, and emphasize relapse prevention. Therapist promoted safe nonjudgmental environment by providing group members with unconditional positive regard and encouraging group members to comply with group rules and guidelines. Therapist assisted group member with identifying and implementing healthier coping strategies.      Plan:  Continue [facility] IOP Phase I   Aftercare:  [facility] Phase II  Program Assignments:  Personal recovery plan, relapse prevention plan, attendance of recovery support group meetings, exploration of sponsorship, drug/alcohol screens.     Ta Ambrose LCSW  5/24/2023  15:44 EDT

## 2023-05-24 NOTE — PROGRESS NOTES
"[facility] GROUP     Date: 05/18/2023  Name: Taye Ahmadi    Time In: 1800   Time Out: 2052     Number of participants: 11.    IOP GROUP NOTE     Data: 3 hour IOP group therapy session (Check-ins, Coping Skills, Relapse Prevention)     Check Ins: Therapist continued facilitation of rapport building strategies between group members. Therapist asked that each patient check in with home life and recovery efforts and identify triggers, cravings, and high risk situations that arise between group sessions. Therapist provided empathy and support during group session.     Session Content/Coping Skills: Annemarie, joined for first part of meeting. Check ins completed by group members. Clinician met with group members for individual check ins. Living in Balance- Session 4- Parts 1 and 2 reviewed and discussed.      Response: Patient attended class in person. Patient participated in completion of check in form. Patient on check in form answered no to question \"currently or since last group meeting have you had any suicidal thoughts or plan or intent to hurt yourself”, and patient also answered no on check in form to question of \"currently or since your last group meeting, have you had any homicidal thoughts or plan or intent to hurt others\".     Personal Assessment 0-10 Scale (0-none, 10-high)    Anxiety:  2   Depression:  1   Cravings: 1     Assessment:     ..  Lab on 05/16/2023   Component Date Value Ref Range Status   • THC, Screen, Urine 05/16/2023 Negative  Negative Final   • Phencyclidine (PCP), Urine 05/16/2023 Negative  Negative Final   • Cocaine Screen, Urine 05/16/2023 Negative  Negative Final   • Methamphetamine, Ur 05/16/2023 Negative  Negative Final   • Opiate Screen 05/16/2023 Negative  Negative Final   • Amphetamine Screen, Urine 05/16/2023 Negative  Negative Final   • Benzodiazepine Screen, Urine 05/16/2023 Negative  Negative Final   • Tricyclic Antidepressants Screen 05/16/2023 " Negative  Negative Final   • Methadone Screen, Urine 05/16/2023 Negative  Negative Final   • Barbiturates Screen, Urine 05/16/2023 Negative  Negative Final   • Oxycodone Screen, Urine 05/16/2023 Negative  Negative Final   • Propoxyphene Screen 05/16/2023 Negative  Negative Final   • Buprenorphine, Screen, Urine 05/16/2023 Negative  Negative Final   • PH 05/16/2023 8.7  4.5 - 9 Final   • CREATININE 05/16/2023 93.5  20 - 300 mg/dL mg/dL Final   • SPECIFIC GRAVITY 05/16/2023 1.014  1.003 - 1.035 Final   • CODEINE 05/16/2023 Negative  50 ng/ml ng/ml Final   • HYDROCODONE 05/16/2023 Negative  50 ng/ml ng/ml Final   • NORHYDROCODONE 05/16/2023 Negative  50 ng/ml ng/ml Final   • HYDROMORPHONE 05/16/2023 Negative  50 ng/ml ng/ml Final   • MORPHINE 05/16/2023 Negative  50 ng/ml ng/ml Final   • FENTANYL 05/16/2023 Negative  2 ng/ml ng/ml Final   • NORFENTANYL 05/16/2023 Negative  10 ng/ml ng/ml Final   • METHADONE 05/16/2023 Negative  25 ng/ml ng/ml Final   • EDDP 05/16/2023 Negative  50 ng/ml ng/ml Final   • OXYCODONE 05/16/2023 Negative  50 ng/ml ng/ml Final   • NOROXYCODONE 05/16/2023 Negative  50 ng/ml ng/ml Final   • OXYMORPHONE 05/16/2023 Negative  50 ng/ml ng/ml Final   • TAPENTADOL 05/16/2023 Negative  50 ng/ml ng/ml Final   • TRAMADOL 05/16/2023 Negative  50 ng/ml ng/ml Final   • BUPRENORPHINE 05/16/2023 Negative  7.5 ng/ml ng/ml Final   • NORBUPRENORPHINE 05/16/2023 Negative  10 ng/ml ng/ml Final   • AMOBARBITAL 05/16/2023 Negative  100 ng/ml ng/ml Final   • BUTABARBITAL 05/16/2023 Negative  100 ng/ml ng/ml Final   • BUTALBITAL 05/16/2023 Negative  100 ng/ml ng/ml Final   • PHENOBARBITAL 05/16/2023 Negative  100 ng/ml ng/ml Final   • SECOBARBITAL 05/16/2023 Negative  100 ng/ml ng/ml Final   • ALPRAZOLAM 05/16/2023 Negative  50 ng/ml ng/ml Final   • ALPHA-HYDROXYALPRAZOLAM 05/16/2023 Negative  50 ng/ml ng/ml Final   • CLONAZEPAM 05/16/2023 Negative  50 ng/ml ng/ml Final   • 7- AMINOCLONAZEPAM 05/16/2023 Negative  50  ng/ml ng/ml Final   • DIAZEPAM 05/16/2023 Negative  50 ng/ml ng/ml Final   • NORDIAZEPAM 05/16/2023 Negative  50 ng/ml ng/ml Final   • LORAZEPAM 05/16/2023 Negative  100 ng/ml ng/ml Final   • MIDAZOLAM 05/16/2023 Negative  50 ng/ml ng/ml Final   • ALPHA-HYDROXYMIDAZOLAM 05/16/2023 Negative  50 ng/ml ng/ml Final   • OXAZEPAM 05/16/2023 Negative  50 ng/ml ng/ml Final   • TEMAZEPAM 05/16/2023 Negative  50 ng/ml ng/ml Final   • ETG 05/16/2023 Negative  200 ng/ml ng/ml Final   • BENZOYLECGONINE 05/16/2023 Negative  100 ng/ml ng/ml Final   • 6-JEREMIAS 05/16/2023 Negative  10 ng/ml ng/ml Final   • MDMA 05/16/2023 Negative  100 ng/ml ng/ml Final   • PCP 05/16/2023 Negative  20 ng/ml ng/ml Final   • DELTA-9-THC 05/16/2023 Negative  20 ng/ml ng/ml Final   • AMPHETAMINE 05/16/2023 Negative  250 ng/ml ng/ml Final   • METHAMPHETAMINE 05/16/2023 Negative  100 ng/ml ng/ml Final   • METHYLPHENIDATE 05/16/2023 Negative  10 ng/ml ng/ml Final   • PHENTERMINE 05/16/2023 Negative  100 ng/ml ng/ml Final   • RITALINIC ACID 05/16/2023 Negative  50 ng/ml ng/ml Final   • CARISOPRODOL 05/16/2023 Negative  100 ng/ml ng/ml Final   • GABAPENTIN 05/16/2023 Negative  500 ng/ml ng/ml Final   • PREGABALIN 05/16/2023 Negative  250 ng/ml ng/ml Final   • ZOLPIDEM 05/16/2023 Negative  2 ng/ml ng/ml Final   • CARBOXYZOLPIDEM 05/16/2023 Negative  10 ng/ml ng/ml Final   Lab on 05/09/2023   Component Date Value Ref Range Status   • THC, Screen, Urine 05/09/2023 Negative  Negative Final   • Phencyclidine (PCP), Urine 05/09/2023 Negative  Negative Final   • Cocaine Screen, Urine 05/09/2023 Negative  Negative Final   • Methamphetamine, Ur 05/09/2023 Negative  Negative Final   • Opiate Screen 05/09/2023 Negative  Negative Final   • Amphetamine Screen, Urine 05/09/2023 Negative  Negative Final   • Benzodiazepine Screen, Urine 05/09/2023 Negative  Negative Final   • Tricyclic Antidepressants Screen 05/09/2023 Negative  Negative Final   • Methadone Screen, Urine  05/09/2023 Negative  Negative Final   • Barbiturates Screen, Urine 05/09/2023 Negative  Negative Final   • Oxycodone Screen, Urine 05/09/2023 Negative  Negative Final   • Propoxyphene Screen 05/09/2023 Negative  Negative Final   • Buprenorphine, Screen, Urine 05/09/2023 Negative  Negative Final   • CODEINE 05/09/2023 Negative  50 ng/ml ng/ml Final   • HYDROCODONE 05/09/2023 Negative  50 ng/ml ng/ml Final   • NORHYDROCODONE 05/09/2023 Negative  50 ng/ml ng/ml Final   • HYDROMORPHONE 05/09/2023 Negative  50 ng/ml ng/ml Final   • MORPHINE 05/09/2023 Negative  50 ng/ml ng/ml Final   • FENTANYL 05/09/2023 Negative  2 ng/ml ng/ml Final   • NORFENTANYL 05/09/2023 Negative  10 ng/ml ng/ml Final   • METHADONE 05/09/2023 Negative  25 ng/ml ng/ml Final   • EDDP 05/09/2023 Negative  50 ng/ml ng/ml Final   • OXYCODONE 05/09/2023 Negative  50 ng/ml ng/ml Final   • NOROXYCODONE 05/09/2023 Negative  50 ng/ml ng/ml Final   • OXYMORPHONE 05/09/2023 Negative  50 ng/ml ng/ml Final   • TAPENTADOL 05/09/2023 Negative  50 ng/ml ng/ml Final   • TRAMADOL 05/09/2023 Negative  50 ng/ml ng/ml Final   • BUPRENORPHINE 05/09/2023 Negative  7.5 ng/ml ng/ml Final   • NORBUPRENORPHINE 05/09/2023 Negative  10 ng/ml ng/ml Final   • AMOBARBITAL 05/09/2023 Negative  100 ng/ml ng/ml Final   • BUTABARBITAL 05/09/2023 Negative  100 ng/ml ng/ml Final   • BUTALBITAL 05/09/2023 Negative  100 ng/ml ng/ml Final   • PHENOBARBITAL 05/09/2023 Negative  100 ng/ml ng/ml Final   • SECOBARBITAL 05/09/2023 Negative  100 ng/ml ng/ml Final   • ALPRAZOLAM 05/09/2023 Negative  50 ng/ml ng/ml Final   • ALPHA-HYDROXYALPRAZOLAM 05/09/2023 Negative  50 ng/ml ng/ml Final   • CLONAZEPAM 05/09/2023 Negative  50 ng/ml ng/ml Final   • 7- AMINOCLONAZEPAM 05/09/2023 Negative  50 ng/ml ng/ml Final   • DIAZEPAM 05/09/2023 Negative  50 ng/ml ng/ml Final   • NORDIAZEPAM 05/09/2023 Negative  50 ng/ml ng/ml Final   • LORAZEPAM 05/09/2023 Negative  100 ng/ml ng/ml Final   • MIDAZOLAM  05/09/2023 Negative  50 ng/ml ng/ml Final   • ALPHA-HYDROXYMIDAZOLAM 05/09/2023 Negative  50 ng/ml ng/ml Final   • OXAZEPAM 05/09/2023 Negative  50 ng/ml ng/ml Final   • TEMAZEPAM 05/09/2023 Negative  50 ng/ml ng/ml Final   • ETG 05/09/2023 Negative  200 ng/ml ng/ml Final   • BENZOYLECGONINE 05/09/2023 Negative  100 ng/ml ng/ml Final   • 6-JEREMIAS 05/09/2023 Negative  10 ng/ml ng/ml Final   • MDMA 05/09/2023 Negative  100 ng/ml ng/ml Final   • PCP 05/09/2023 Negative  20 ng/ml ng/ml Final   • DELTA-9-THC 05/09/2023 Negative  20 ng/ml ng/ml Final   • AMPHETAMINE 05/09/2023 Negative  250 ng/ml ng/ml Final   • METHAMPHETAMINE 05/09/2023 Negative  100 ng/ml ng/ml Final   • METHYLPHENIDATE 05/09/2023 Negative  10 ng/ml ng/ml Final   • PHENTERMINE 05/09/2023 Negative  100 ng/ml ng/ml Final   • RITALINIC ACID 05/09/2023 Negative  50 ng/ml ng/ml Final   • CARISOPRODOL 05/09/2023 Negative  100 ng/ml ng/ml Final   • GABAPENTIN 05/09/2023 Negative  500 ng/ml ng/ml Final   • PREGABALIN 05/09/2023 Negative  250 ng/ml ng/ml Final   • ZOLPIDEM 05/09/2023 Negative  2 ng/ml ng/ml Final   • CARBOXYZOLPIDEM 05/09/2023 Negative  10 ng/ml ng/ml Final   • PH 05/09/2023 8.3  4.5 - 9 Final   • CREATININE 05/09/2023 21.7  20 - 300 mg/dL mg/dL Final   • SPECIFIC GRAVITY 05/09/2023 1.002  1.003 - 1.035 Final   Lab on 05/02/2023   Component Date Value Ref Range Status   • THC, Screen, Urine 05/02/2023 Negative  Negative Final   • Phencyclidine (PCP), Urine 05/02/2023 Negative  Negative Final   • Cocaine Screen, Urine 05/02/2023 Negative  Negative Final   • Methamphetamine, Ur 05/02/2023 Negative  Negative Final   • Opiate Screen 05/02/2023 Negative  Negative Final   • Amphetamine Screen, Urine 05/02/2023 Negative  Negative Final   • Benzodiazepine Screen, Urine 05/02/2023 Negative  Negative Final   • Tricyclic Antidepressants Screen 05/02/2023 Negative  Negative Final   • Methadone Screen, Urine 05/02/2023 Negative  Negative Final   • Barbiturates  Screen, Urine 05/02/2023 Negative  Negative Final   • Oxycodone Screen, Urine 05/02/2023 Negative  Negative Final   • Propoxyphene Screen 05/02/2023 Negative  Negative Final   • Buprenorphine, Screen, Urine 05/02/2023 Negative  Negative Final   • CODEINE 05/02/2023 Negative  50 ng/ml ng/ml Final   • HYDROCODONE 05/02/2023 Negative  50 ng/ml ng/ml Final   • NORHYDROCODONE 05/02/2023 Negative  50 ng/ml ng/ml Final   • HYDROMORPHONE 05/02/2023 Negative  50 ng/ml ng/ml Final   • MORPHINE 05/02/2023 Negative  50 ng/ml ng/ml Final   • FENTANYL 05/02/2023 Negative  2 ng/ml ng/ml Final   • NORFENTANYL 05/02/2023 Negative  10 ng/ml ng/ml Final   • METHADONE 05/02/2023 Negative  25 ng/ml ng/ml Final   • EDDP 05/02/2023 Negative  50 ng/ml ng/ml Final   • OXYCODONE 05/02/2023 Negative  50 ng/ml ng/ml Final   • NOROXYCODONE 05/02/2023 Negative  50 ng/ml ng/ml Final   • OXYMORPHONE 05/02/2023 Negative  50 ng/ml ng/ml Final   • TAPENTADOL 05/02/2023 Negative  50 ng/ml ng/ml Final   • TRAMADOL 05/02/2023 Negative  50 ng/ml ng/ml Final   • BUPRENORPHINE 05/02/2023 Negative  7.5 ng/ml ng/ml Final   • NORBUPRENORPHINE 05/02/2023 Negative  10 ng/ml ng/ml Final   • AMOBARBITAL 05/02/2023 Negative  100 ng/ml ng/ml Final   • BUTABARBITAL 05/02/2023 Negative  100 ng/ml ng/ml Final   • BUTALBITAL 05/02/2023 Negative  100 ng/ml ng/ml Final   • PHENOBARBITAL 05/02/2023 Negative  100 ng/ml ng/ml Final   • SECOBARBITAL 05/02/2023 Negative  100 ng/ml ng/ml Final   • ALPRAZOLAM 05/02/2023 Negative  50 ng/ml ng/ml Final   • ALPHA-HYDROXYALPRAZOLAM 05/02/2023 Negative  50 ng/ml ng/ml Final   • CLONAZEPAM 05/02/2023 Negative  50 ng/ml ng/ml Final   • 7- AMINOCLONAZEPAM 05/02/2023 Negative  50 ng/ml ng/ml Final   • DIAZEPAM 05/02/2023 Negative  50 ng/ml ng/ml Final   • NORDIAZEPAM 05/02/2023 Negative  50 ng/ml ng/ml Final   • LORAZEPAM 05/02/2023 Negative  100 ng/ml ng/ml Final   • MIDAZOLAM 05/02/2023 Negative  50 ng/ml ng/ml Final   •  ALPHA-HYDROXYMIDAZOLAM 05/02/2023 Negative  50 ng/ml ng/ml Final   • OXAZEPAM 05/02/2023 Negative  50 ng/ml ng/ml Final   • TEMAZEPAM 05/02/2023 Negative  50 ng/ml ng/ml Final   • ETG 05/02/2023 Negative  200 ng/ml ng/ml Final   • BENZOYLECGONINE 05/02/2023 Negative  100 ng/ml ng/ml Final   • 6-JEREMIAS 05/02/2023 Negative  10 ng/ml ng/ml Final   • MDMA 05/02/2023 Negative  100 ng/ml ng/ml Final   • PCP 05/02/2023 Negative  20 ng/ml ng/ml Final   • DELTA-9-THC 05/02/2023 Negative  20 ng/ml ng/ml Final   • AMPHETAMINE 05/02/2023 Negative  250 ng/ml ng/ml Final   • METHAMPHETAMINE 05/02/2023 Negative  100 ng/ml ng/ml Final   • METHYLPHENIDATE 05/02/2023 Negative  10 ng/ml ng/ml Final   • PHENTERMINE 05/02/2023 Negative  100 ng/ml ng/ml Final   • RITALINIC ACID 05/02/2023 Negative  50 ng/ml ng/ml Final   • CARISOPRODOL 05/02/2023 Negative  100 ng/ml ng/ml Final   • GABAPENTIN 05/02/2023 Negative  500 ng/ml ng/ml Final   • PREGABALIN 05/02/2023 Negative  250 ng/ml ng/ml Final   • ZOLPIDEM 05/02/2023 Negative  2 ng/ml ng/ml Final   • CARBOXYZOLPIDEM 05/02/2023 Negative  10 ng/ml ng/ml Final   • PH 05/02/2023 8.8  4.5 - 9 Final   • CREATININE 05/02/2023 185.7  20 - 300 mg/dL mg/dL Final   • SPECIFIC GRAVITY 05/02/2023 1.021  1.003 - 1.035 Final       Mental Status Exam  Hygiene:  good  Dress: casual  Attitude: cooperative and agreeable   Motor Activity: appropriate  Eye Contact:  good  Speech: regular rate and rhythm   Mood:  calm and cooperative  Affect:  Appropriate  Thought Processes:  Linear  Thought Content:  Normal  Suicidal Thoughts:  denies  Homicidal Thoughts:  denies  Crisis Safety Plan: Safety plan has been discussed.   Hallucinations:  Unknown to clinician.   Reliability: fair  Insight: fair  Judgement: fair  Impulse Control: fair    Recovery/spiritual support group attendance: No.      Progress toward goal: Not at goal    Prognosis: Fair with Ongoing Treatment     Self-reported number of days sober: 62.    Patient  will contact this office, call 911 or present to the nearest emergency room should suicidal or homicidal ideations occur.    Impression/Formulation:    ICD-10-CM ICD-9-CM   1. Opioid use disorder, severe, dependence  F11.20 304.00       Clinical Maneuvering/Interventions: Therapist utilized a person-centered approach to build rapport with group member. Therapist implemented motivational interviewing techniques to assist client with exploring and resolving ambivalence associated with commitment to change behaviors related to substance use and addiction. Therapist applied cognitive behavioral strategies to facilitate identification of maladaptive patterns of thinking and behavior that contribute to client's risk for continued substance use and relapse. Therapist employed group interaction activities to build rapport among group members, promote sobriety, and emphasize relapse prevention. Therapist promoted safe nonjudgmental environment by providing group members with unconditional positive regard and encouraging group members to comply with group rules and guidelines. Therapist assisted group member with identifying and implementing healthier coping strategies.      Plan:  Continue [facility] IOP Phase I   Aftercare:  [facility] Phase II  Program Assignments:  Personal recovery plan, relapse prevention plan, attendance of recovery support group meetings, exploration of sponsorship, drug/alcohol screens.     Ta Ambrose LCSW  5/24/2023  13:58 EDT

## 2023-05-24 NOTE — PROGRESS NOTES
"[facility] GROUP     Date: 05/17/2023  Name: Taye Ahmadi    Time In: 1800   Time Out: 2050     Number of participants: 7.    IOP GROUP NOTE     Data: 3 hour IOP group therapy session (Check-ins, Coping Skills, Relapse Prevention)     Check Ins: Therapist continued facilitation of rapport building strategies between group members. Therapist asked that each patient check in with home life and recovery efforts and identify triggers, cravings, and high risk situations that arise between group sessions. Therapist provided empathy and support during group session.     Session Content/Coping Skills: , Annemarie, joined for first part of meeting and shared Just for Today reading. Character defects discussed with group. Check ins completed by group members. Article “The 5 Rules of Recovery” finished (retrieved from www.addictionsandrecovery.org/five-rules-of-recovery.htm). Group members completed RAPID scale in article. YouTube video “The Road to Recovery” viewed and discussed (ME[employer] Real-Time).      Response: Patient attended class in person. Patient participated in completion of check in form. Patient on check in form answered no to question \"currently or since last group meeting have you had any suicidal thoughts or plan or intent to hurt yourself”, and patient also answered no on check in form to question of \"currently or since your last group meeting, have you had any homicidal thoughts or plan or intent to hurt others\".     Personal Assessment 0-10 Scale (0-none, 10-high)    Anxiety:  3   Depression:  1   Cravings: 1     Assessment:     ..  Lab on 05/16/2023   Component Date Value Ref Range Status   • THC, Screen, Urine 05/16/2023 Negative  Negative Final   • Phencyclidine (PCP), Urine 05/16/2023 Negative  Negative Final   • Cocaine Screen, Urine 05/16/2023 Negative  Negative Final   • Methamphetamine, Ur 05/16/2023 Negative  Negative Final   • Opiate Screen 05/16/2023 Negative  Negative Final "   • Amphetamine Screen, Urine 05/16/2023 Negative  Negative Final   • Benzodiazepine Screen, Urine 05/16/2023 Negative  Negative Final   • Tricyclic Antidepressants Screen 05/16/2023 Negative  Negative Final   • Methadone Screen, Urine 05/16/2023 Negative  Negative Final   • Barbiturates Screen, Urine 05/16/2023 Negative  Negative Final   • Oxycodone Screen, Urine 05/16/2023 Negative  Negative Final   • Propoxyphene Screen 05/16/2023 Negative  Negative Final   • Buprenorphine, Screen, Urine 05/16/2023 Negative  Negative Final   • PH 05/16/2023 8.7  4.5 - 9 Final   • CREATININE 05/16/2023 93.5  20 - 300 mg/dL mg/dL Final   • SPECIFIC GRAVITY 05/16/2023 1.014  1.003 - 1.035 Final   • CODEINE 05/16/2023 Negative  50 ng/ml ng/ml Final   • HYDROCODONE 05/16/2023 Negative  50 ng/ml ng/ml Final   • NORHYDROCODONE 05/16/2023 Negative  50 ng/ml ng/ml Final   • HYDROMORPHONE 05/16/2023 Negative  50 ng/ml ng/ml Final   • MORPHINE 05/16/2023 Negative  50 ng/ml ng/ml Final   • FENTANYL 05/16/2023 Negative  2 ng/ml ng/ml Final   • NORFENTANYL 05/16/2023 Negative  10 ng/ml ng/ml Final   • METHADONE 05/16/2023 Negative  25 ng/ml ng/ml Final   • EDDP 05/16/2023 Negative  50 ng/ml ng/ml Final   • OXYCODONE 05/16/2023 Negative  50 ng/ml ng/ml Final   • NOROXYCODONE 05/16/2023 Negative  50 ng/ml ng/ml Final   • OXYMORPHONE 05/16/2023 Negative  50 ng/ml ng/ml Final   • TAPENTADOL 05/16/2023 Negative  50 ng/ml ng/ml Final   • TRAMADOL 05/16/2023 Negative  50 ng/ml ng/ml Final   • BUPRENORPHINE 05/16/2023 Negative  7.5 ng/ml ng/ml Final   • NORBUPRENORPHINE 05/16/2023 Negative  10 ng/ml ng/ml Final   • AMOBARBITAL 05/16/2023 Negative  100 ng/ml ng/ml Final   • BUTABARBITAL 05/16/2023 Negative  100 ng/ml ng/ml Final   • BUTALBITAL 05/16/2023 Negative  100 ng/ml ng/ml Final   • PHENOBARBITAL 05/16/2023 Negative  100 ng/ml ng/ml Final   • SECOBARBITAL 05/16/2023 Negative  100 ng/ml ng/ml Final   • ALPRAZOLAM 05/16/2023 Negative  50 ng/ml  ng/ml Final   • ALPHA-HYDROXYALPRAZOLAM 05/16/2023 Negative  50 ng/ml ng/ml Final   • CLONAZEPAM 05/16/2023 Negative  50 ng/ml ng/ml Final   • 7- AMINOCLONAZEPAM 05/16/2023 Negative  50 ng/ml ng/ml Final   • DIAZEPAM 05/16/2023 Negative  50 ng/ml ng/ml Final   • NORDIAZEPAM 05/16/2023 Negative  50 ng/ml ng/ml Final   • LORAZEPAM 05/16/2023 Negative  100 ng/ml ng/ml Final   • MIDAZOLAM 05/16/2023 Negative  50 ng/ml ng/ml Final   • ALPHA-HYDROXYMIDAZOLAM 05/16/2023 Negative  50 ng/ml ng/ml Final   • OXAZEPAM 05/16/2023 Negative  50 ng/ml ng/ml Final   • TEMAZEPAM 05/16/2023 Negative  50 ng/ml ng/ml Final   • ETG 05/16/2023 Negative  200 ng/ml ng/ml Final   • BENZOYLECGONINE 05/16/2023 Negative  100 ng/ml ng/ml Final   • 6-JEREMIAS 05/16/2023 Negative  10 ng/ml ng/ml Final   • MDMA 05/16/2023 Negative  100 ng/ml ng/ml Final   • PCP 05/16/2023 Negative  20 ng/ml ng/ml Final   • DELTA-9-THC 05/16/2023 Negative  20 ng/ml ng/ml Final   • AMPHETAMINE 05/16/2023 Negative  250 ng/ml ng/ml Final   • METHAMPHETAMINE 05/16/2023 Negative  100 ng/ml ng/ml Final   • METHYLPHENIDATE 05/16/2023 Negative  10 ng/ml ng/ml Final   • PHENTERMINE 05/16/2023 Negative  100 ng/ml ng/ml Final   • RITALINIC ACID 05/16/2023 Negative  50 ng/ml ng/ml Final   • CARISOPRODOL 05/16/2023 Negative  100 ng/ml ng/ml Final   • GABAPENTIN 05/16/2023 Negative  500 ng/ml ng/ml Final   • PREGABALIN 05/16/2023 Negative  250 ng/ml ng/ml Final   • ZOLPIDEM 05/16/2023 Negative  2 ng/ml ng/ml Final   • CARBOXYZOLPIDEM 05/16/2023 Negative  10 ng/ml ng/ml Final   Lab on 05/09/2023   Component Date Value Ref Range Status   • THC, Screen, Urine 05/09/2023 Negative  Negative Final   • Phencyclidine (PCP), Urine 05/09/2023 Negative  Negative Final   • Cocaine Screen, Urine 05/09/2023 Negative  Negative Final   • Methamphetamine, Ur 05/09/2023 Negative  Negative Final   • Opiate Screen 05/09/2023 Negative  Negative Final   • Amphetamine Screen, Urine 05/09/2023 Negative   Negative Final   • Benzodiazepine Screen, Urine 05/09/2023 Negative  Negative Final   • Tricyclic Antidepressants Screen 05/09/2023 Negative  Negative Final   • Methadone Screen, Urine 05/09/2023 Negative  Negative Final   • Barbiturates Screen, Urine 05/09/2023 Negative  Negative Final   • Oxycodone Screen, Urine 05/09/2023 Negative  Negative Final   • Propoxyphene Screen 05/09/2023 Negative  Negative Final   • Buprenorphine, Screen, Urine 05/09/2023 Negative  Negative Final   • CODEINE 05/09/2023 Negative  50 ng/ml ng/ml Final   • HYDROCODONE 05/09/2023 Negative  50 ng/ml ng/ml Final   • NORHYDROCODONE 05/09/2023 Negative  50 ng/ml ng/ml Final   • HYDROMORPHONE 05/09/2023 Negative  50 ng/ml ng/ml Final   • MORPHINE 05/09/2023 Negative  50 ng/ml ng/ml Final   • FENTANYL 05/09/2023 Negative  2 ng/ml ng/ml Final   • NORFENTANYL 05/09/2023 Negative  10 ng/ml ng/ml Final   • METHADONE 05/09/2023 Negative  25 ng/ml ng/ml Final   • EDDP 05/09/2023 Negative  50 ng/ml ng/ml Final   • OXYCODONE 05/09/2023 Negative  50 ng/ml ng/ml Final   • NOROXYCODONE 05/09/2023 Negative  50 ng/ml ng/ml Final   • OXYMORPHONE 05/09/2023 Negative  50 ng/ml ng/ml Final   • TAPENTADOL 05/09/2023 Negative  50 ng/ml ng/ml Final   • TRAMADOL 05/09/2023 Negative  50 ng/ml ng/ml Final   • BUPRENORPHINE 05/09/2023 Negative  7.5 ng/ml ng/ml Final   • NORBUPRENORPHINE 05/09/2023 Negative  10 ng/ml ng/ml Final   • AMOBARBITAL 05/09/2023 Negative  100 ng/ml ng/ml Final   • BUTABARBITAL 05/09/2023 Negative  100 ng/ml ng/ml Final   • BUTALBITAL 05/09/2023 Negative  100 ng/ml ng/ml Final   • PHENOBARBITAL 05/09/2023 Negative  100 ng/ml ng/ml Final   • SECOBARBITAL 05/09/2023 Negative  100 ng/ml ng/ml Final   • ALPRAZOLAM 05/09/2023 Negative  50 ng/ml ng/ml Final   • ALPHA-HYDROXYALPRAZOLAM 05/09/2023 Negative  50 ng/ml ng/ml Final   • CLONAZEPAM 05/09/2023 Negative  50 ng/ml ng/ml Final   • 7- AMINOCLONAZEPAM 05/09/2023 Negative  50 ng/ml ng/ml Final   •  DIAZEPAM 05/09/2023 Negative  50 ng/ml ng/ml Final   • NORDIAZEPAM 05/09/2023 Negative  50 ng/ml ng/ml Final   • LORAZEPAM 05/09/2023 Negative  100 ng/ml ng/ml Final   • MIDAZOLAM 05/09/2023 Negative  50 ng/ml ng/ml Final   • ALPHA-HYDROXYMIDAZOLAM 05/09/2023 Negative  50 ng/ml ng/ml Final   • OXAZEPAM 05/09/2023 Negative  50 ng/ml ng/ml Final   • TEMAZEPAM 05/09/2023 Negative  50 ng/ml ng/ml Final   • ETG 05/09/2023 Negative  200 ng/ml ng/ml Final   • BENZOYLECGONINE 05/09/2023 Negative  100 ng/ml ng/ml Final   • 6-JEREMIAS 05/09/2023 Negative  10 ng/ml ng/ml Final   • MDMA 05/09/2023 Negative  100 ng/ml ng/ml Final   • PCP 05/09/2023 Negative  20 ng/ml ng/ml Final   • DELTA-9-THC 05/09/2023 Negative  20 ng/ml ng/ml Final   • AMPHETAMINE 05/09/2023 Negative  250 ng/ml ng/ml Final   • METHAMPHETAMINE 05/09/2023 Negative  100 ng/ml ng/ml Final   • METHYLPHENIDATE 05/09/2023 Negative  10 ng/ml ng/ml Final   • PHENTERMINE 05/09/2023 Negative  100 ng/ml ng/ml Final   • RITALINIC ACID 05/09/2023 Negative  50 ng/ml ng/ml Final   • CARISOPRODOL 05/09/2023 Negative  100 ng/ml ng/ml Final   • GABAPENTIN 05/09/2023 Negative  500 ng/ml ng/ml Final   • PREGABALIN 05/09/2023 Negative  250 ng/ml ng/ml Final   • ZOLPIDEM 05/09/2023 Negative  2 ng/ml ng/ml Final   • CARBOXYZOLPIDEM 05/09/2023 Negative  10 ng/ml ng/ml Final   • PH 05/09/2023 8.3  4.5 - 9 Final   • CREATININE 05/09/2023 21.7  20 - 300 mg/dL mg/dL Final   • SPECIFIC GRAVITY 05/09/2023 1.002  1.003 - 1.035 Final   Lab on 05/02/2023   Component Date Value Ref Range Status   • THC, Screen, Urine 05/02/2023 Negative  Negative Final   • Phencyclidine (PCP), Urine 05/02/2023 Negative  Negative Final   • Cocaine Screen, Urine 05/02/2023 Negative  Negative Final   • Methamphetamine, Ur 05/02/2023 Negative  Negative Final   • Opiate Screen 05/02/2023 Negative  Negative Final   • Amphetamine Screen, Urine 05/02/2023 Negative  Negative Final   • Benzodiazepine Screen, Urine  05/02/2023 Negative  Negative Final   • Tricyclic Antidepressants Screen 05/02/2023 Negative  Negative Final   • Methadone Screen, Urine 05/02/2023 Negative  Negative Final   • Barbiturates Screen, Urine 05/02/2023 Negative  Negative Final   • Oxycodone Screen, Urine 05/02/2023 Negative  Negative Final   • Propoxyphene Screen 05/02/2023 Negative  Negative Final   • Buprenorphine, Screen, Urine 05/02/2023 Negative  Negative Final   • CODEINE 05/02/2023 Negative  50 ng/ml ng/ml Final   • HYDROCODONE 05/02/2023 Negative  50 ng/ml ng/ml Final   • NORHYDROCODONE 05/02/2023 Negative  50 ng/ml ng/ml Final   • HYDROMORPHONE 05/02/2023 Negative  50 ng/ml ng/ml Final   • MORPHINE 05/02/2023 Negative  50 ng/ml ng/ml Final   • FENTANYL 05/02/2023 Negative  2 ng/ml ng/ml Final   • NORFENTANYL 05/02/2023 Negative  10 ng/ml ng/ml Final   • METHADONE 05/02/2023 Negative  25 ng/ml ng/ml Final   • EDDP 05/02/2023 Negative  50 ng/ml ng/ml Final   • OXYCODONE 05/02/2023 Negative  50 ng/ml ng/ml Final   • NOROXYCODONE 05/02/2023 Negative  50 ng/ml ng/ml Final   • OXYMORPHONE 05/02/2023 Negative  50 ng/ml ng/ml Final   • TAPENTADOL 05/02/2023 Negative  50 ng/ml ng/ml Final   • TRAMADOL 05/02/2023 Negative  50 ng/ml ng/ml Final   • BUPRENORPHINE 05/02/2023 Negative  7.5 ng/ml ng/ml Final   • NORBUPRENORPHINE 05/02/2023 Negative  10 ng/ml ng/ml Final   • AMOBARBITAL 05/02/2023 Negative  100 ng/ml ng/ml Final   • BUTABARBITAL 05/02/2023 Negative  100 ng/ml ng/ml Final   • BUTALBITAL 05/02/2023 Negative  100 ng/ml ng/ml Final   • PHENOBARBITAL 05/02/2023 Negative  100 ng/ml ng/ml Final   • SECOBARBITAL 05/02/2023 Negative  100 ng/ml ng/ml Final   • ALPRAZOLAM 05/02/2023 Negative  50 ng/ml ng/ml Final   • ALPHA-HYDROXYALPRAZOLAM 05/02/2023 Negative  50 ng/ml ng/ml Final   • CLONAZEPAM 05/02/2023 Negative  50 ng/ml ng/ml Final   • 7- AMINOCLONAZEPAM 05/02/2023 Negative  50 ng/ml ng/ml Final   • DIAZEPAM 05/02/2023 Negative  50 ng/ml ng/ml  Final   • NORDIAZEPAM 05/02/2023 Negative  50 ng/ml ng/ml Final   • LORAZEPAM 05/02/2023 Negative  100 ng/ml ng/ml Final   • MIDAZOLAM 05/02/2023 Negative  50 ng/ml ng/ml Final   • ALPHA-HYDROXYMIDAZOLAM 05/02/2023 Negative  50 ng/ml ng/ml Final   • OXAZEPAM 05/02/2023 Negative  50 ng/ml ng/ml Final   • TEMAZEPAM 05/02/2023 Negative  50 ng/ml ng/ml Final   • ETG 05/02/2023 Negative  200 ng/ml ng/ml Final   • BENZOYLECGONINE 05/02/2023 Negative  100 ng/ml ng/ml Final   • 6-JEREMIAS 05/02/2023 Negative  10 ng/ml ng/ml Final   • MDMA 05/02/2023 Negative  100 ng/ml ng/ml Final   • PCP 05/02/2023 Negative  20 ng/ml ng/ml Final   • DELTA-9-THC 05/02/2023 Negative  20 ng/ml ng/ml Final   • AMPHETAMINE 05/02/2023 Negative  250 ng/ml ng/ml Final   • METHAMPHETAMINE 05/02/2023 Negative  100 ng/ml ng/ml Final   • METHYLPHENIDATE 05/02/2023 Negative  10 ng/ml ng/ml Final   • PHENTERMINE 05/02/2023 Negative  100 ng/ml ng/ml Final   • RITALINIC ACID 05/02/2023 Negative  50 ng/ml ng/ml Final   • CARISOPRODOL 05/02/2023 Negative  100 ng/ml ng/ml Final   • GABAPENTIN 05/02/2023 Negative  500 ng/ml ng/ml Final   • PREGABALIN 05/02/2023 Negative  250 ng/ml ng/ml Final   • ZOLPIDEM 05/02/2023 Negative  2 ng/ml ng/ml Final   • CARBOXYZOLPIDEM 05/02/2023 Negative  10 ng/ml ng/ml Final   • PH 05/02/2023 8.8  4.5 - 9 Final   • CREATININE 05/02/2023 185.7  20 - 300 mg/dL mg/dL Final   • SPECIFIC GRAVITY 05/02/2023 1.021  1.003 - 1.035 Final       Mental Status Exam  Hygiene:  good  Dress: casual  Attitude: cooperative and agreeable   Motor Activity: appropriate  Eye Contact:  good  Speech: regular rate and rhythm   Mood:  calm and cooperative  Affect:  Appropriate  Thought Processes:  Linear  Thought Content:  Normal  Suicidal Thoughts:  denies  Homicidal Thoughts:  denies  Crisis Safety Plan: Safety plan has been discussed.   Hallucinations:  Unknown to clinician.   Reliability: fair  Insight: fair  Judgement: fair  Impulse Control:  fair    Recovery/spiritual support group attendance: No.      Progress toward goal: Not at goal    Prognosis: Fair with Ongoing Treatment     Self-reported number of days sober: 61.    Patient will contact this office, call 911 or present to the nearest emergency room should suicidal or homicidal ideations occur.    Impression/Formulation:    ICD-10-CM ICD-9-CM   1. Opioid use disorder, severe, dependence  F11.20 304.00       Clinical Maneuvering/Interventions: Therapist utilized a person-centered approach to build rapport with group member. Therapist implemented motivational interviewing techniques to assist client with exploring and resolving ambivalence associated with commitment to change behaviors related to substance use and addiction. Therapist applied cognitive behavioral strategies to facilitate identification of maladaptive patterns of thinking and behavior that contribute to client's risk for continued substance use and relapse. Therapist employed group interaction activities to build rapport among group members, promote sobriety, and emphasize relapse prevention. Therapist promoted safe nonjudgmental environment by providing group members with unconditional positive regard and encouraging group members to comply with group rules and guidelines. Therapist assisted group member with identifying and implementing healthier coping strategies.      Plan:  Continue [facility] IOP Phase I   Aftercare:  [facility] Phase II  Program Assignments:  Personal recovery plan, relapse prevention plan, attendance of recovery support group meetings, exploration of sponsorship, drug/alcohol screens.     Ta Ambrose LCSW  5/24/2023  12:31 EDT

## 2023-05-25 ENCOUNTER — OFFICE VISIT (OUTPATIENT)
Dept: PSYCHIATRY | Facility: HOSPITAL | Age: 38
End: 2023-05-25
Payer: MEDICAID

## 2023-05-25 DIAGNOSIS — F11.20 OPIOID USE DISORDER, SEVERE, DEPENDENCE: Primary | ICD-10-CM

## 2023-05-25 PROCEDURE — H0015 ALCOHOL AND/OR DRUG SERVICES: HCPCS | Performed by: NURSE PRACTITIONER

## 2023-05-26 LAB
1OH-MIDAZOLAM UR CFM-MCNC: NEGATIVE NG/ML
6MAM UR CFM-MCNC: NEGATIVE NG/ML
7AMINOCLONAZEPAM UR CFM-MCNC: NEGATIVE NG/ML
7AMINOCLONAZEPAM UR CFM-MCNC: NEGATIVE NG/ML
A-OH ALPRAZ UR CFM-MCNC: NEGATIVE NG/ML
ALPRAZ UR CFM-MCNC: NEGATIVE NG/ML
AMOBARBITAL UR CFM-MCNC: NEGATIVE NG/ML
AMPHET UR CFM-MCNC: NEGATIVE NG/ML
BUPRENORPHINE UR-MCNC: NEGATIVE NG/ML
BUTABARBITAL UR CFM-MCNC: NEGATIVE NG/ML
BUTALBITAL UR CFM-MCNC: NEGATIVE NG/ML
BZE UR CFM-MCNC: NEGATIVE NG/ML
CARBOXYTHC UR CFM-MCNC: NEGATIVE NG/ML
CARISOPRODOL UR CFM-MCNC: NEGATIVE NG/ML
CODEINE UR CFM-MCNC: NEGATIVE NG/ML
CREATININE: 42.9 MG/DL
DIAZEPAM UR CFM-MCNC: NEGATIVE NG/ML
EDDP UR CFM-MCNC: NEGATIVE NG/ML
ETHYL GLUCURONIDE UR CFM-MCNC: NEGATIVE NG/ML
FENTANYL UR-MCNC: NEGATIVE NG/ML
GABAPENTIN UR CFM-MCNC: NEGATIVE NG/ML
HYDROCODONE UR CFM-MCNC: NEGATIVE NG/ML
HYDROMORPHONE UR CFM-MCNC: NEGATIVE NG/ML
LORAZEPAM UR CFM-MCNC: NEGATIVE NG/ML
MDMA UR CFM-MCNC: NEGATIVE NG/ML
ME-PHENIDATE UR CFM-MCNC: NEGATIVE NG/ML
METHADONE UR CFM-MCNC: NEGATIVE NG/ML
METHAMPHET UR CFM-MCNC: NEGATIVE NG/ML
MIDAZOLAM UR CFM-MCNC: NEGATIVE NG/ML
MORPHINE UR CFM-MCNC: NEGATIVE NG/ML
NORBUPRENORPHINE UR CFM-MCNC: NEGATIVE NG/ML
NORDIAZEPAM UR CFM-MCNC: NEGATIVE NG/ML
NORFENTANYL UR CFM-MCNC: NEGATIVE NG/ML
NORHYDROCODONE UR CFM-MCNC: NEGATIVE NG/ML
NOROXYCODONE UR CFM-MCNC: NEGATIVE NG/ML
OXAZEPAM CTO UR CFM-MCNC: NEGATIVE NG/ML
OXYCODONE SERPL-MCNC: NEGATIVE NG/ML
OXYMORPHONE SERPL-MCNC: NEGATIVE NG/ML
PCP UR CFM-MCNC: NEGATIVE NG/ML
PH: 8.1 (ref 4.5–9)
PHENOBARB UR CFM-MCNC: NEGATIVE NG/ML
PHENTERMINE: NEGATIVE NG/ML
PPAA UR CFM-MCNC: NEGATIVE NG/ML
PREGABALIN UR CFM-MCNC: NEGATIVE NG/ML
SECOBARBITAL UR CFM-MCNC: NEGATIVE NG/ML
SPECIFIC GRAVITY: 1.01 (ref 1–1.03)
TAPENTADOL UR CFM-MCNC: NEGATIVE NG/ML
TEMAZEPAM UR CFM-MCNC: NEGATIVE NG/ML
TRAMADOL: NEGATIVE NG/ML
ZOLPIDEM PHENYL-4-CARB UR CFM-MCNC: NEGATIVE NG/ML
ZOLPIDEM UR CFM-MCNC: NEGATIVE NG/ML

## 2023-05-29 ENCOUNTER — OFFICE VISIT (OUTPATIENT)
Dept: PSYCHIATRY | Facility: HOSPITAL | Age: 38
End: 2023-05-29
Payer: MEDICAID

## 2023-05-29 DIAGNOSIS — F11.20 OPIOID USE DISORDER, SEVERE, DEPENDENCE: Primary | ICD-10-CM

## 2023-05-29 PROCEDURE — H0015 ALCOHOL AND/OR DRUG SERVICES: HCPCS | Performed by: NURSE PRACTITIONER

## 2023-05-29 NOTE — PROGRESS NOTES
"[facility] IOP GROUP     Date: 05/25/2023  Name: Taye Ahmadi    Time In: 1800   Time Out: 2100     Number of participants: 9.    IOP GROUP NOTE     Data: 3 hour IOP group therapy session (Check-ins, Coping Skills, Relapse Prevention)     Check Ins: Therapist continued facilitation of rapport building strategies between group members. Therapist asked that each patient check in with home life and recovery efforts and identify triggers, cravings, and high risk situations that arise between group sessions. Therapist provided empathy and support during group session.     Session Content/Coping Skills: Clinician reviewed topics from Wednesday’s class. , Annemarie, joined for first part of meeting and shared reading from the [employer]. Check ins completed by group members. Icebreaker activity completed where each group member was asked to interview a classmates and share facts learned from each group member. Article “8 types of denial in addiction” reviewed and discussed ([employer]). [employer] video “Addiction: Tomorrow Is Going To Be Better Juan R Nava’s Story” viewed ([employer]). Group members were asked to identify addictive thinking displayed in the video and how they felt this contributed to Juan R’s addiction.      Response: Patient attended class in person. Patient participated in completion of check in form. Patient on check in form answered no to question \"currently or since last group meeting have you had any suicidal thoughts or plan or intent to hurt yourself”, and patient also answered no on check in form to question of \"currently or since your last group meeting, have you had any homicidal thoughts or plan or intent to hurt others\".     Personal Assessment 0-10 Scale (0-none, 10-high)    Anxiety:  3   Depression:  1   Cravings: 1     Assessment:     ..  Lab on 05/23/2023   Component Date Value Ref Range Status   • THC, Screen, Urine 05/23/2023 Negative  Negative Final   • " Phencyclidine (PCP), Urine 05/23/2023 Negative  Negative Final   • Cocaine Screen, Urine 05/23/2023 Negative  Negative Final   • Methamphetamine, Ur 05/23/2023 Negative  Negative Final   • Opiate Screen 05/23/2023 Negative  Negative Final   • Amphetamine Screen, Urine 05/23/2023 Negative  Negative Final   • Benzodiazepine Screen, Urine 05/23/2023 Negative  Negative Final   • Tricyclic Antidepressants Screen 05/23/2023 Negative  Negative Final   • Methadone Screen, Urine 05/23/2023 Negative  Negative Final   • Barbiturates Screen, Urine 05/23/2023 Negative  Negative Final   • Oxycodone Screen, Urine 05/23/2023 Negative  Negative Final   • Propoxyphene Screen 05/23/2023 Negative  Negative Final   • Buprenorphine, Screen, Urine 05/23/2023 Negative  Negative Final   • PH 05/23/2023 8.1  4.5 - 9 Final   • CREATININE 05/23/2023 42.9  20 - 300 mg/dL mg/dL Final   • SPECIFIC GRAVITY 05/23/2023 1.006  1.003 - 1.035 Final   • CODEINE 05/23/2023 Negative  50 ng/ml ng/ml Final   • HYDROCODONE 05/23/2023 Negative  50 ng/ml ng/ml Final   • NORHYDROCODONE 05/23/2023 Negative  50 ng/ml ng/ml Final   • HYDROMORPHONE 05/23/2023 Negative  50 ng/ml ng/ml Final   • MORPHINE 05/23/2023 Negative  50 ng/ml ng/ml Final   • FENTANYL 05/23/2023 Negative  2 ng/ml ng/ml Final   • NORFENTANYL 05/23/2023 Negative  10 ng/ml ng/ml Final   • METHADONE 05/23/2023 Negative  25 ng/ml ng/ml Final   • EDDP 05/23/2023 Negative  50 ng/ml ng/ml Final   • OXYCODONE 05/23/2023 Negative  50 ng/ml ng/ml Final   • NOROXYCODONE 05/23/2023 Negative  50 ng/ml ng/ml Final   • OXYMORPHONE 05/23/2023 Negative  50 ng/ml ng/ml Final   • TAPENTADOL 05/23/2023 Negative  50 ng/ml ng/ml Final   • TRAMADOL 05/23/2023 Negative  50 ng/ml ng/ml Final   • BUPRENORPHINE 05/23/2023 Negative  7.5 ng/ml ng/ml Final   • NORBUPRENORPHINE 05/23/2023 Negative  10 ng/ml ng/ml Final   • AMOBARBITAL 05/23/2023 Negative  100 ng/ml ng/ml Final   • BUTABARBITAL 05/23/2023 Negative  100 ng/ml  ng/ml Final   • BUTALBITAL 05/23/2023 Negative  100 ng/ml ng/ml Final   • PHENOBARBITAL 05/23/2023 Negative  100 ng/ml ng/ml Final   • SECOBARBITAL 05/23/2023 Negative  100 ng/ml ng/ml Final   • ALPRAZOLAM 05/23/2023 Negative  50 ng/ml ng/ml Final   • ALPHA-HYDROXYALPRAZOLAM 05/23/2023 Negative  50 ng/ml ng/ml Final   • CLONAZEPAM 05/23/2023 Negative  50 ng/ml ng/ml Final   • 7- AMINOCLONAZEPAM 05/23/2023 Negative  50 ng/ml ng/ml Final   • DIAZEPAM 05/23/2023 Negative  50 ng/ml ng/ml Final   • NORDIAZEPAM 05/23/2023 Negative  50 ng/ml ng/ml Final   • LORAZEPAM 05/23/2023 Negative  100 ng/ml ng/ml Final   • MIDAZOLAM 05/23/2023 Negative  50 ng/ml ng/ml Final   • ALPHA-HYDROXYMIDAZOLAM 05/23/2023 Negative  50 ng/ml ng/ml Final   • OXAZEPAM 05/23/2023 Negative  50 ng/ml ng/ml Final   • TEMAZEPAM 05/23/2023 Negative  50 ng/ml ng/ml Final   • ETG 05/23/2023 Negative  200 ng/ml ng/ml Final   • BENZOYLECGONINE 05/23/2023 Negative  100 ng/ml ng/ml Final   • 6-JEREMIAS 05/23/2023 Negative  10 ng/ml ng/ml Final   • MDMA 05/23/2023 Negative  100 ng/ml ng/ml Final   • PCP 05/23/2023 Negative  20 ng/ml ng/ml Final   • DELTA-9-THC 05/23/2023 Negative  20 ng/ml ng/ml Final   • AMPHETAMINE 05/23/2023 Negative  250 ng/ml ng/ml Final   • METHAMPHETAMINE 05/23/2023 Negative  100 ng/ml ng/ml Final   • METHYLPHENIDATE 05/23/2023 Negative  10 ng/ml ng/ml Final   • PHENTERMINE 05/23/2023 Negative  100 ng/ml ng/ml Final   • RITALINIC ACID 05/23/2023 Negative  50 ng/ml ng/ml Final   • CARISOPRODOL 05/23/2023 Negative  100 ng/ml ng/ml Final   • GABAPENTIN 05/23/2023 Negative  500 ng/ml ng/ml Final   • PREGABALIN 05/23/2023 Negative  250 ng/ml ng/ml Final   • ZOLPIDEM 05/23/2023 Negative  2 ng/ml ng/ml Final   • CARBOXYZOLPIDEM 05/23/2023 Negative  10 ng/ml ng/ml Final   Lab on 05/16/2023   Component Date Value Ref Range Status   • THC, Screen, Urine 05/16/2023 Negative  Negative Final   • Phencyclidine (PCP), Urine 05/16/2023 Negative  Negative  Final   • Cocaine Screen, Urine 05/16/2023 Negative  Negative Final   • Methamphetamine, Ur 05/16/2023 Negative  Negative Final   • Opiate Screen 05/16/2023 Negative  Negative Final   • Amphetamine Screen, Urine 05/16/2023 Negative  Negative Final   • Benzodiazepine Screen, Urine 05/16/2023 Negative  Negative Final   • Tricyclic Antidepressants Screen 05/16/2023 Negative  Negative Final   • Methadone Screen, Urine 05/16/2023 Negative  Negative Final   • Barbiturates Screen, Urine 05/16/2023 Negative  Negative Final   • Oxycodone Screen, Urine 05/16/2023 Negative  Negative Final   • Propoxyphene Screen 05/16/2023 Negative  Negative Final   • Buprenorphine, Screen, Urine 05/16/2023 Negative  Negative Final   • PH 05/16/2023 8.7  4.5 - 9 Final   • CREATININE 05/16/2023 93.5  20 - 300 mg/dL mg/dL Final   • SPECIFIC GRAVITY 05/16/2023 1.014  1.003 - 1.035 Final   • CODEINE 05/16/2023 Negative  50 ng/ml ng/ml Final   • HYDROCODONE 05/16/2023 Negative  50 ng/ml ng/ml Final   • NORHYDROCODONE 05/16/2023 Negative  50 ng/ml ng/ml Final   • HYDROMORPHONE 05/16/2023 Negative  50 ng/ml ng/ml Final   • MORPHINE 05/16/2023 Negative  50 ng/ml ng/ml Final   • FENTANYL 05/16/2023 Negative  2 ng/ml ng/ml Final   • NORFENTANYL 05/16/2023 Negative  10 ng/ml ng/ml Final   • METHADONE 05/16/2023 Negative  25 ng/ml ng/ml Final   • EDDP 05/16/2023 Negative  50 ng/ml ng/ml Final   • OXYCODONE 05/16/2023 Negative  50 ng/ml ng/ml Final   • NOROXYCODONE 05/16/2023 Negative  50 ng/ml ng/ml Final   • OXYMORPHONE 05/16/2023 Negative  50 ng/ml ng/ml Final   • TAPENTADOL 05/16/2023 Negative  50 ng/ml ng/ml Final   • TRAMADOL 05/16/2023 Negative  50 ng/ml ng/ml Final   • BUPRENORPHINE 05/16/2023 Negative  7.5 ng/ml ng/ml Final   • NORBUPRENORPHINE 05/16/2023 Negative  10 ng/ml ng/ml Final   • AMOBARBITAL 05/16/2023 Negative  100 ng/ml ng/ml Final   • BUTABARBITAL 05/16/2023 Negative  100 ng/ml ng/ml Final   • BUTALBITAL 05/16/2023 Negative  100 ng/ml  ng/ml Final   • PHENOBARBITAL 05/16/2023 Negative  100 ng/ml ng/ml Final   • SECOBARBITAL 05/16/2023 Negative  100 ng/ml ng/ml Final   • ALPRAZOLAM 05/16/2023 Negative  50 ng/ml ng/ml Final   • ALPHA-HYDROXYALPRAZOLAM 05/16/2023 Negative  50 ng/ml ng/ml Final   • CLONAZEPAM 05/16/2023 Negative  50 ng/ml ng/ml Final   • 7- AMINOCLONAZEPAM 05/16/2023 Negative  50 ng/ml ng/ml Final   • DIAZEPAM 05/16/2023 Negative  50 ng/ml ng/ml Final   • NORDIAZEPAM 05/16/2023 Negative  50 ng/ml ng/ml Final   • LORAZEPAM 05/16/2023 Negative  100 ng/ml ng/ml Final   • MIDAZOLAM 05/16/2023 Negative  50 ng/ml ng/ml Final   • ALPHA-HYDROXYMIDAZOLAM 05/16/2023 Negative  50 ng/ml ng/ml Final   • OXAZEPAM 05/16/2023 Negative  50 ng/ml ng/ml Final   • TEMAZEPAM 05/16/2023 Negative  50 ng/ml ng/ml Final   • ETG 05/16/2023 Negative  200 ng/ml ng/ml Final   • BENZOYLECGONINE 05/16/2023 Negative  100 ng/ml ng/ml Final   • 6-JEREMIAS 05/16/2023 Negative  10 ng/ml ng/ml Final   • MDMA 05/16/2023 Negative  100 ng/ml ng/ml Final   • PCP 05/16/2023 Negative  20 ng/ml ng/ml Final   • DELTA-9-THC 05/16/2023 Negative  20 ng/ml ng/ml Final   • AMPHETAMINE 05/16/2023 Negative  250 ng/ml ng/ml Final   • METHAMPHETAMINE 05/16/2023 Negative  100 ng/ml ng/ml Final   • METHYLPHENIDATE 05/16/2023 Negative  10 ng/ml ng/ml Final   • PHENTERMINE 05/16/2023 Negative  100 ng/ml ng/ml Final   • RITALINIC ACID 05/16/2023 Negative  50 ng/ml ng/ml Final   • CARISOPRODOL 05/16/2023 Negative  100 ng/ml ng/ml Final   • GABAPENTIN 05/16/2023 Negative  500 ng/ml ng/ml Final   • PREGABALIN 05/16/2023 Negative  250 ng/ml ng/ml Final   • ZOLPIDEM 05/16/2023 Negative  2 ng/ml ng/ml Final   • CARBOXYZOLPIDEM 05/16/2023 Negative  10 ng/ml ng/ml Final   Lab on 05/09/2023   Component Date Value Ref Range Status   • THC, Screen, Urine 05/09/2023 Negative  Negative Final   • Phencyclidine (PCP), Urine 05/09/2023 Negative  Negative Final   • Cocaine Screen, Urine 05/09/2023 Negative   Negative Final   • Methamphetamine, Ur 05/09/2023 Negative  Negative Final   • Opiate Screen 05/09/2023 Negative  Negative Final   • Amphetamine Screen, Urine 05/09/2023 Negative  Negative Final   • Benzodiazepine Screen, Urine 05/09/2023 Negative  Negative Final   • Tricyclic Antidepressants Screen 05/09/2023 Negative  Negative Final   • Methadone Screen, Urine 05/09/2023 Negative  Negative Final   • Barbiturates Screen, Urine 05/09/2023 Negative  Negative Final   • Oxycodone Screen, Urine 05/09/2023 Negative  Negative Final   • Propoxyphene Screen 05/09/2023 Negative  Negative Final   • Buprenorphine, Screen, Urine 05/09/2023 Negative  Negative Final   • CODEINE 05/09/2023 Negative  50 ng/ml ng/ml Final   • HYDROCODONE 05/09/2023 Negative  50 ng/ml ng/ml Final   • NORHYDROCODONE 05/09/2023 Negative  50 ng/ml ng/ml Final   • HYDROMORPHONE 05/09/2023 Negative  50 ng/ml ng/ml Final   • MORPHINE 05/09/2023 Negative  50 ng/ml ng/ml Final   • FENTANYL 05/09/2023 Negative  2 ng/ml ng/ml Final   • NORFENTANYL 05/09/2023 Negative  10 ng/ml ng/ml Final   • METHADONE 05/09/2023 Negative  25 ng/ml ng/ml Final   • EDDP 05/09/2023 Negative  50 ng/ml ng/ml Final   • OXYCODONE 05/09/2023 Negative  50 ng/ml ng/ml Final   • NOROXYCODONE 05/09/2023 Negative  50 ng/ml ng/ml Final   • OXYMORPHONE 05/09/2023 Negative  50 ng/ml ng/ml Final   • TAPENTADOL 05/09/2023 Negative  50 ng/ml ng/ml Final   • TRAMADOL 05/09/2023 Negative  50 ng/ml ng/ml Final   • BUPRENORPHINE 05/09/2023 Negative  7.5 ng/ml ng/ml Final   • NORBUPRENORPHINE 05/09/2023 Negative  10 ng/ml ng/ml Final   • AMOBARBITAL 05/09/2023 Negative  100 ng/ml ng/ml Final   • BUTABARBITAL 05/09/2023 Negative  100 ng/ml ng/ml Final   • BUTALBITAL 05/09/2023 Negative  100 ng/ml ng/ml Final   • PHENOBARBITAL 05/09/2023 Negative  100 ng/ml ng/ml Final   • SECOBARBITAL 05/09/2023 Negative  100 ng/ml ng/ml Final   • ALPRAZOLAM 05/09/2023 Negative  50 ng/ml ng/ml Final   •  ALPHA-HYDROXYALPRAZOLAM 05/09/2023 Negative  50 ng/ml ng/ml Final   • CLONAZEPAM 05/09/2023 Negative  50 ng/ml ng/ml Final   • 7- AMINOCLONAZEPAM 05/09/2023 Negative  50 ng/ml ng/ml Final   • DIAZEPAM 05/09/2023 Negative  50 ng/ml ng/ml Final   • NORDIAZEPAM 05/09/2023 Negative  50 ng/ml ng/ml Final   • LORAZEPAM 05/09/2023 Negative  100 ng/ml ng/ml Final   • MIDAZOLAM 05/09/2023 Negative  50 ng/ml ng/ml Final   • ALPHA-HYDROXYMIDAZOLAM 05/09/2023 Negative  50 ng/ml ng/ml Final   • OXAZEPAM 05/09/2023 Negative  50 ng/ml ng/ml Final   • TEMAZEPAM 05/09/2023 Negative  50 ng/ml ng/ml Final   • ETG 05/09/2023 Negative  200 ng/ml ng/ml Final   • BENZOYLECGONINE 05/09/2023 Negative  100 ng/ml ng/ml Final   • 6-JEREMIAS 05/09/2023 Negative  10 ng/ml ng/ml Final   • MDMA 05/09/2023 Negative  100 ng/ml ng/ml Final   • PCP 05/09/2023 Negative  20 ng/ml ng/ml Final   • DELTA-9-THC 05/09/2023 Negative  20 ng/ml ng/ml Final   • AMPHETAMINE 05/09/2023 Negative  250 ng/ml ng/ml Final   • METHAMPHETAMINE 05/09/2023 Negative  100 ng/ml ng/ml Final   • METHYLPHENIDATE 05/09/2023 Negative  10 ng/ml ng/ml Final   • PHENTERMINE 05/09/2023 Negative  100 ng/ml ng/ml Final   • RITALINIC ACID 05/09/2023 Negative  50 ng/ml ng/ml Final   • CARISOPRODOL 05/09/2023 Negative  100 ng/ml ng/ml Final   • GABAPENTIN 05/09/2023 Negative  500 ng/ml ng/ml Final   • PREGABALIN 05/09/2023 Negative  250 ng/ml ng/ml Final   • ZOLPIDEM 05/09/2023 Negative  2 ng/ml ng/ml Final   • CARBOXYZOLPIDEM 05/09/2023 Negative  10 ng/ml ng/ml Final   • PH 05/09/2023 8.3  4.5 - 9 Final   • CREATININE 05/09/2023 21.7  20 - 300 mg/dL mg/dL Final   • SPECIFIC GRAVITY 05/09/2023 1.002  1.003 - 1.035 Final       Mental Status Exam  Hygiene:  good  Dress: casual  Attitude: cooperative and agreeable   Motor Activity: appropriate  Eye Contact:  good  Speech: regular rate and rhythm   Mood:  calm and cooperative  Affect:  Appropriate  Thought Processes:  Linear  Thought Content:   Normal  Suicidal Thoughts:  denies  Homicidal Thoughts:  denies  Crisis Safety Plan: Safety plan has been discussed.   Hallucinations:  Unknown to clinician.   Reliability: fair  Insight: fair  Judgement: fair  Impulse Control: fair    Recovery/spiritual support group attendance: No.      Progress toward goal: Not at goal    Prognosis: Fair with Ongoing Treatment     Self-reported number of days sober: Patient reported 70 on check in form.     Patient will contact this office, call 911 or present to the nearest emergency room should suicidal or homicidal ideations occur.    Impression/Formulation:    ICD-10-CM ICD-9-CM   1. Opioid use disorder, severe, dependence  F11.20 304.00       Clinical Maneuvering/Interventions: Therapist utilized a person-centered approach to build rapport with group member. Therapist implemented motivational interviewing techniques to assist client with exploring and resolving ambivalence associated with commitment to change behaviors related to substance use and addiction. Therapist applied cognitive behavioral strategies to facilitate identification of maladaptive patterns of thinking and behavior that contribute to client's risk for continued substance use and relapse. Therapist employed group interaction activities to build rapport among group members, promote sobriety, and emphasize relapse prevention. Therapist promoted safe nonjudgmental environment by providing group members with unconditional positive regard and encouraging group members to comply with group rules and guidelines. Therapist assisted group member with identifying and implementing healthier coping strategies.      Plan:  Continue [facility] IOP Phase I   Aftercare:  [facility] Phase II  Program Assignments:  Personal recovery plan, relapse prevention plan, attendance of recovery support group meetings, exploration of sponsorship, drug/alcohol screens.     Ta  KERVIN Ambrose  5/29/2023  14:12 EDT

## 2023-05-29 NOTE — PROGRESS NOTES
"[facility] GROUP     Date: 05/24/2023  Name: Taye Ahmadi    Time In: 1800   Time Out: 2045     Number of participants: 11.    IOP GROUP NOTE     Data: 3 hour IOP group therapy session (Check-ins, Coping Skills, Relapse Prevention)     Check Ins: Therapist continued facilitation of rapport building strategies between group members. Therapist asked that each patient check in with home life and recovery efforts and identify triggers, cravings, and high risk situations that arise between group sessions. Therapist provided empathy and support during group session.     Session Content/Coping Skills: Check ins completed by group members. Gian,  with [facility], joined for first part of session and shared just for today reading. Clinician discussed with group importance of compliance with program, attendance, and completing drug screens timely in order to remain in [facility]-IOP program. Living in Balance, Session 4, Part 3 finished. Therapist Aid The Cognitive Model and cognitive distortions psychoeducational material reviewed and discussed. [facility] (2007), The disease of addiction: changing addictive thought patterns, p. 6-9 reviewed and discussed. [employer] video “Addictive Behavior and Self-Deception\" viewed ([employer]). Group members were asked to list which addictive thoughts presented in the video they have had in the past. Group members were also asked to identify a recovery based thought to challenge each of the thoughts listed. Coping Skills Log was provided to group members and clinician explained log. Group members completed check out questions.       Response: Patient attended class in person. Patient participated in completion of check in form. Patient on check in form answered no to question \"currently or since last group meeting have you had any suicidal thoughts or plan or intent to hurt yourself”, and patient also answered no on check in form " "to question of \"currently or since your last group meeting, have you had any homicidal thoughts or plan or intent to hurt others\".     Personal Assessment 0-10 Scale (0-none, 10-high)    Anxiety:  4   Depression:  1   Cravings: 2     Assessment:     ..  Lab on 05/23/2023   Component Date Value Ref Range Status   • THC, Screen, Urine 05/23/2023 Negative  Negative Final   • Phencyclidine (PCP), Urine 05/23/2023 Negative  Negative Final   • Cocaine Screen, Urine 05/23/2023 Negative  Negative Final   • Methamphetamine, Ur 05/23/2023 Negative  Negative Final   • Opiate Screen 05/23/2023 Negative  Negative Final   • Amphetamine Screen, Urine 05/23/2023 Negative  Negative Final   • Benzodiazepine Screen, Urine 05/23/2023 Negative  Negative Final   • Tricyclic Antidepressants Screen 05/23/2023 Negative  Negative Final   • Methadone Screen, Urine 05/23/2023 Negative  Negative Final   • Barbiturates Screen, Urine 05/23/2023 Negative  Negative Final   • Oxycodone Screen, Urine 05/23/2023 Negative  Negative Final   • Propoxyphene Screen 05/23/2023 Negative  Negative Final   • Buprenorphine, Screen, Urine 05/23/2023 Negative  Negative Final   • PH 05/23/2023 8.1  4.5 - 9 Final   • CREATININE 05/23/2023 42.9  20 - 300 mg/dL mg/dL Final   • SPECIFIC GRAVITY 05/23/2023 1.006  1.003 - 1.035 Final   • CODEINE 05/23/2023 Negative  50 ng/ml ng/ml Final   • HYDROCODONE 05/23/2023 Negative  50 ng/ml ng/ml Final   • NORHYDROCODONE 05/23/2023 Negative  50 ng/ml ng/ml Final   • HYDROMORPHONE 05/23/2023 Negative  50 ng/ml ng/ml Final   • MORPHINE 05/23/2023 Negative  50 ng/ml ng/ml Final   • FENTANYL 05/23/2023 Negative  2 ng/ml ng/ml Final   • NORFENTANYL 05/23/2023 Negative  10 ng/ml ng/ml Final   • METHADONE 05/23/2023 Negative  25 ng/ml ng/ml Final   • EDDP 05/23/2023 Negative  50 ng/ml ng/ml Final   • OXYCODONE 05/23/2023 Negative  50 ng/ml ng/ml Final   • NOROXYCODONE 05/23/2023 Negative  50 ng/ml ng/ml Final   • OXYMORPHONE 05/23/2023 " Negative  50 ng/ml ng/ml Final   • TAPENTADOL 05/23/2023 Negative  50 ng/ml ng/ml Final   • TRAMADOL 05/23/2023 Negative  50 ng/ml ng/ml Final   • BUPRENORPHINE 05/23/2023 Negative  7.5 ng/ml ng/ml Final   • NORBUPRENORPHINE 05/23/2023 Negative  10 ng/ml ng/ml Final   • AMOBARBITAL 05/23/2023 Negative  100 ng/ml ng/ml Final   • BUTABARBITAL 05/23/2023 Negative  100 ng/ml ng/ml Final   • BUTALBITAL 05/23/2023 Negative  100 ng/ml ng/ml Final   • PHENOBARBITAL 05/23/2023 Negative  100 ng/ml ng/ml Final   • SECOBARBITAL 05/23/2023 Negative  100 ng/ml ng/ml Final   • ALPRAZOLAM 05/23/2023 Negative  50 ng/ml ng/ml Final   • ALPHA-HYDROXYALPRAZOLAM 05/23/2023 Negative  50 ng/ml ng/ml Final   • CLONAZEPAM 05/23/2023 Negative  50 ng/ml ng/ml Final   • 7- AMINOCLONAZEPAM 05/23/2023 Negative  50 ng/ml ng/ml Final   • DIAZEPAM 05/23/2023 Negative  50 ng/ml ng/ml Final   • NORDIAZEPAM 05/23/2023 Negative  50 ng/ml ng/ml Final   • LORAZEPAM 05/23/2023 Negative  100 ng/ml ng/ml Final   • MIDAZOLAM 05/23/2023 Negative  50 ng/ml ng/ml Final   • ALPHA-HYDROXYMIDAZOLAM 05/23/2023 Negative  50 ng/ml ng/ml Final   • OXAZEPAM 05/23/2023 Negative  50 ng/ml ng/ml Final   • TEMAZEPAM 05/23/2023 Negative  50 ng/ml ng/ml Final   • ETG 05/23/2023 Negative  200 ng/ml ng/ml Final   • BENZOYLECGONINE 05/23/2023 Negative  100 ng/ml ng/ml Final   • 6-JEREMIAS 05/23/2023 Negative  10 ng/ml ng/ml Final   • MDMA 05/23/2023 Negative  100 ng/ml ng/ml Final   • PCP 05/23/2023 Negative  20 ng/ml ng/ml Final   • DELTA-9-THC 05/23/2023 Negative  20 ng/ml ng/ml Final   • AMPHETAMINE 05/23/2023 Negative  250 ng/ml ng/ml Final   • METHAMPHETAMINE 05/23/2023 Negative  100 ng/ml ng/ml Final   • METHYLPHENIDATE 05/23/2023 Negative  10 ng/ml ng/ml Final   • PHENTERMINE 05/23/2023 Negative  100 ng/ml ng/ml Final   • RITALINIC ACID 05/23/2023 Negative  50 ng/ml ng/ml Final   • CARISOPRODOL 05/23/2023 Negative  100 ng/ml ng/ml Final   • GABAPENTIN 05/23/2023 Negative  500  ng/ml ng/ml Final   • PREGABALIN 05/23/2023 Negative  250 ng/ml ng/ml Final   • ZOLPIDEM 05/23/2023 Negative  2 ng/ml ng/ml Final   • CARBOXYZOLPIDEM 05/23/2023 Negative  10 ng/ml ng/ml Final   Lab on 05/16/2023   Component Date Value Ref Range Status   • THC, Screen, Urine 05/16/2023 Negative  Negative Final   • Phencyclidine (PCP), Urine 05/16/2023 Negative  Negative Final   • Cocaine Screen, Urine 05/16/2023 Negative  Negative Final   • Methamphetamine, Ur 05/16/2023 Negative  Negative Final   • Opiate Screen 05/16/2023 Negative  Negative Final   • Amphetamine Screen, Urine 05/16/2023 Negative  Negative Final   • Benzodiazepine Screen, Urine 05/16/2023 Negative  Negative Final   • Tricyclic Antidepressants Screen 05/16/2023 Negative  Negative Final   • Methadone Screen, Urine 05/16/2023 Negative  Negative Final   • Barbiturates Screen, Urine 05/16/2023 Negative  Negative Final   • Oxycodone Screen, Urine 05/16/2023 Negative  Negative Final   • Propoxyphene Screen 05/16/2023 Negative  Negative Final   • Buprenorphine, Screen, Urine 05/16/2023 Negative  Negative Final   • PH 05/16/2023 8.7  4.5 - 9 Final   • CREATININE 05/16/2023 93.5  20 - 300 mg/dL mg/dL Final   • SPECIFIC GRAVITY 05/16/2023 1.014  1.003 - 1.035 Final   • CODEINE 05/16/2023 Negative  50 ng/ml ng/ml Final   • HYDROCODONE 05/16/2023 Negative  50 ng/ml ng/ml Final   • NORHYDROCODONE 05/16/2023 Negative  50 ng/ml ng/ml Final   • HYDROMORPHONE 05/16/2023 Negative  50 ng/ml ng/ml Final   • MORPHINE 05/16/2023 Negative  50 ng/ml ng/ml Final   • FENTANYL 05/16/2023 Negative  2 ng/ml ng/ml Final   • NORFENTANYL 05/16/2023 Negative  10 ng/ml ng/ml Final   • METHADONE 05/16/2023 Negative  25 ng/ml ng/ml Final   • EDDP 05/16/2023 Negative  50 ng/ml ng/ml Final   • OXYCODONE 05/16/2023 Negative  50 ng/ml ng/ml Final   • NOROXYCODONE 05/16/2023 Negative  50 ng/ml ng/ml Final   • OXYMORPHONE 05/16/2023 Negative  50 ng/ml ng/ml Final   • TAPENTADOL 05/16/2023  Negative  50 ng/ml ng/ml Final   • TRAMADOL 05/16/2023 Negative  50 ng/ml ng/ml Final   • BUPRENORPHINE 05/16/2023 Negative  7.5 ng/ml ng/ml Final   • NORBUPRENORPHINE 05/16/2023 Negative  10 ng/ml ng/ml Final   • AMOBARBITAL 05/16/2023 Negative  100 ng/ml ng/ml Final   • BUTABARBITAL 05/16/2023 Negative  100 ng/ml ng/ml Final   • BUTALBITAL 05/16/2023 Negative  100 ng/ml ng/ml Final   • PHENOBARBITAL 05/16/2023 Negative  100 ng/ml ng/ml Final   • SECOBARBITAL 05/16/2023 Negative  100 ng/ml ng/ml Final   • ALPRAZOLAM 05/16/2023 Negative  50 ng/ml ng/ml Final   • ALPHA-HYDROXYALPRAZOLAM 05/16/2023 Negative  50 ng/ml ng/ml Final   • CLONAZEPAM 05/16/2023 Negative  50 ng/ml ng/ml Final   • 7- AMINOCLONAZEPAM 05/16/2023 Negative  50 ng/ml ng/ml Final   • DIAZEPAM 05/16/2023 Negative  50 ng/ml ng/ml Final   • NORDIAZEPAM 05/16/2023 Negative  50 ng/ml ng/ml Final   • LORAZEPAM 05/16/2023 Negative  100 ng/ml ng/ml Final   • MIDAZOLAM 05/16/2023 Negative  50 ng/ml ng/ml Final   • ALPHA-HYDROXYMIDAZOLAM 05/16/2023 Negative  50 ng/ml ng/ml Final   • OXAZEPAM 05/16/2023 Negative  50 ng/ml ng/ml Final   • TEMAZEPAM 05/16/2023 Negative  50 ng/ml ng/ml Final   • ETG 05/16/2023 Negative  200 ng/ml ng/ml Final   • BENZOYLECGONINE 05/16/2023 Negative  100 ng/ml ng/ml Final   • 6-EJREMIAS 05/16/2023 Negative  10 ng/ml ng/ml Final   • MDMA 05/16/2023 Negative  100 ng/ml ng/ml Final   • PCP 05/16/2023 Negative  20 ng/ml ng/ml Final   • DELTA-9-THC 05/16/2023 Negative  20 ng/ml ng/ml Final   • AMPHETAMINE 05/16/2023 Negative  250 ng/ml ng/ml Final   • METHAMPHETAMINE 05/16/2023 Negative  100 ng/ml ng/ml Final   • METHYLPHENIDATE 05/16/2023 Negative  10 ng/ml ng/ml Final   • PHENTERMINE 05/16/2023 Negative  100 ng/ml ng/ml Final   • RITALINIC ACID 05/16/2023 Negative  50 ng/ml ng/ml Final   • CARISOPRODOL 05/16/2023 Negative  100 ng/ml ng/ml Final   • GABAPENTIN 05/16/2023 Negative  500 ng/ml ng/ml Final   • PREGABALIN 05/16/2023 Negative   250 ng/ml ng/ml Final   • ZOLPIDEM 05/16/2023 Negative  2 ng/ml ng/ml Final   • CARBOXYZOLPIDEM 05/16/2023 Negative  10 ng/ml ng/ml Final   Lab on 05/09/2023   Component Date Value Ref Range Status   • THC, Screen, Urine 05/09/2023 Negative  Negative Final   • Phencyclidine (PCP), Urine 05/09/2023 Negative  Negative Final   • Cocaine Screen, Urine 05/09/2023 Negative  Negative Final   • Methamphetamine, Ur 05/09/2023 Negative  Negative Final   • Opiate Screen 05/09/2023 Negative  Negative Final   • Amphetamine Screen, Urine 05/09/2023 Negative  Negative Final   • Benzodiazepine Screen, Urine 05/09/2023 Negative  Negative Final   • Tricyclic Antidepressants Screen 05/09/2023 Negative  Negative Final   • Methadone Screen, Urine 05/09/2023 Negative  Negative Final   • Barbiturates Screen, Urine 05/09/2023 Negative  Negative Final   • Oxycodone Screen, Urine 05/09/2023 Negative  Negative Final   • Propoxyphene Screen 05/09/2023 Negative  Negative Final   • Buprenorphine, Screen, Urine 05/09/2023 Negative  Negative Final   • CODEINE 05/09/2023 Negative  50 ng/ml ng/ml Final   • HYDROCODONE 05/09/2023 Negative  50 ng/ml ng/ml Final   • NORHYDROCODONE 05/09/2023 Negative  50 ng/ml ng/ml Final   • HYDROMORPHONE 05/09/2023 Negative  50 ng/ml ng/ml Final   • MORPHINE 05/09/2023 Negative  50 ng/ml ng/ml Final   • FENTANYL 05/09/2023 Negative  2 ng/ml ng/ml Final   • NORFENTANYL 05/09/2023 Negative  10 ng/ml ng/ml Final   • METHADONE 05/09/2023 Negative  25 ng/ml ng/ml Final   • EDDP 05/09/2023 Negative  50 ng/ml ng/ml Final   • OXYCODONE 05/09/2023 Negative  50 ng/ml ng/ml Final   • NOROXYCODONE 05/09/2023 Negative  50 ng/ml ng/ml Final   • OXYMORPHONE 05/09/2023 Negative  50 ng/ml ng/ml Final   • TAPENTADOL 05/09/2023 Negative  50 ng/ml ng/ml Final   • TRAMADOL 05/09/2023 Negative  50 ng/ml ng/ml Final   • BUPRENORPHINE 05/09/2023 Negative  7.5 ng/ml ng/ml Final   • NORBUPRENORPHINE 05/09/2023 Negative  10 ng/ml ng/ml Final    • AMOBARBITAL 05/09/2023 Negative  100 ng/ml ng/ml Final   • BUTABARBITAL 05/09/2023 Negative  100 ng/ml ng/ml Final   • BUTALBITAL 05/09/2023 Negative  100 ng/ml ng/ml Final   • PHENOBARBITAL 05/09/2023 Negative  100 ng/ml ng/ml Final   • SECOBARBITAL 05/09/2023 Negative  100 ng/ml ng/ml Final   • ALPRAZOLAM 05/09/2023 Negative  50 ng/ml ng/ml Final   • ALPHA-HYDROXYALPRAZOLAM 05/09/2023 Negative  50 ng/ml ng/ml Final   • CLONAZEPAM 05/09/2023 Negative  50 ng/ml ng/ml Final   • 7- AMINOCLONAZEPAM 05/09/2023 Negative  50 ng/ml ng/ml Final   • DIAZEPAM 05/09/2023 Negative  50 ng/ml ng/ml Final   • NORDIAZEPAM 05/09/2023 Negative  50 ng/ml ng/ml Final   • LORAZEPAM 05/09/2023 Negative  100 ng/ml ng/ml Final   • MIDAZOLAM 05/09/2023 Negative  50 ng/ml ng/ml Final   • ALPHA-HYDROXYMIDAZOLAM 05/09/2023 Negative  50 ng/ml ng/ml Final   • OXAZEPAM 05/09/2023 Negative  50 ng/ml ng/ml Final   • TEMAZEPAM 05/09/2023 Negative  50 ng/ml ng/ml Final   • ETG 05/09/2023 Negative  200 ng/ml ng/ml Final   • BENZOYLECGONINE 05/09/2023 Negative  100 ng/ml ng/ml Final   • 6-JEREMIAS 05/09/2023 Negative  10 ng/ml ng/ml Final   • MDMA 05/09/2023 Negative  100 ng/ml ng/ml Final   • PCP 05/09/2023 Negative  20 ng/ml ng/ml Final   • DELTA-9-THC 05/09/2023 Negative  20 ng/ml ng/ml Final   • AMPHETAMINE 05/09/2023 Negative  250 ng/ml ng/ml Final   • METHAMPHETAMINE 05/09/2023 Negative  100 ng/ml ng/ml Final   • METHYLPHENIDATE 05/09/2023 Negative  10 ng/ml ng/ml Final   • PHENTERMINE 05/09/2023 Negative  100 ng/ml ng/ml Final   • RITALINIC ACID 05/09/2023 Negative  50 ng/ml ng/ml Final   • CARISOPRODOL 05/09/2023 Negative  100 ng/ml ng/ml Final   • GABAPENTIN 05/09/2023 Negative  500 ng/ml ng/ml Final   • PREGABALIN 05/09/2023 Negative  250 ng/ml ng/ml Final   • ZOLPIDEM 05/09/2023 Negative  2 ng/ml ng/ml Final   • CARBOXYZOLPIDEM 05/09/2023 Negative  10 ng/ml ng/ml Final   • PH 05/09/2023 8.3  4.5 - 9 Final   • CREATININE 05/09/2023 21.7  20 -  300 mg/dL mg/dL Final   • SPECIFIC GRAVITY 05/09/2023 1.002  1.003 - 1.035 Final       Mental Status Exam  Hygiene:  good  Dress: casual  Attitude: cooperative and agreeable   Motor Activity: appropriate  Eye Contact:  good  Speech: regular rate and rhythm   Mood:  calm and cooperative  Affect:  Appropriate  Thought Processes:  Linear  Thought Content:  Normal  Suicidal Thoughts:  denies  Homicidal Thoughts:  denies  Crisis Safety Plan: Safety plan has been discussed.   Hallucinations:  Unknown to clinician.   Reliability: fair  Insight: fair  Judgement: fair  Impulse Control: fair    Recovery/spiritual support group attendance: No.      Progress toward goal: Not at goal    Prognosis: Fair with Ongoing Treatment     Self-reported number of days sober: Patient reported 69 on check in form.     Patient will contact this office, call 911 or present to the nearest emergency room should suicidal or homicidal ideations occur.    Impression/Formulation:    ICD-10-CM ICD-9-CM   1. Opioid use disorder, severe, dependence  F11.20 304.00       Clinical Maneuvering/Interventions: Therapist utilized a person-centered approach to build rapport with group member. Therapist implemented motivational interviewing techniques to assist client with exploring and resolving ambivalence associated with commitment to change behaviors related to substance use and addiction. Therapist applied cognitive behavioral strategies to facilitate identification of maladaptive patterns of thinking and behavior that contribute to client's risk for continued substance use and relapse. Therapist employed group interaction activities to build rapport among group members, promote sobriety, and emphasize relapse prevention. Therapist promoted safe nonjudgmental environment by providing group members with unconditional positive regard and encouraging group members to comply with group rules and guidelines. Therapist assisted group member with identifying and  implementing healthier coping strategies.      Plan:  Continue [facility] IOP Phase I   Aftercare:  [facility] Phase II  Program Assignments:  Personal recovery plan, relapse prevention plan, attendance of recovery support group meetings, exploration of sponsorship, drug/alcohol screens.     Ta Ambrose LCSW  5/29/2023  13:18 EDT

## 2023-05-30 ENCOUNTER — LAB (OUTPATIENT)
Dept: LAB | Facility: HOSPITAL | Age: 38
End: 2023-05-30

## 2023-05-30 DIAGNOSIS — F11.20 OPIOID USE DISORDER, SEVERE, DEPENDENCE: ICD-10-CM

## 2023-05-30 PROCEDURE — 80306 DRUG TEST PRSMV INSTRMNT: CPT

## 2023-05-31 ENCOUNTER — OFFICE VISIT (OUTPATIENT)
Dept: PSYCHIATRY | Facility: HOSPITAL | Age: 38
End: 2023-05-31
Payer: MEDICAID

## 2023-05-31 DIAGNOSIS — F11.20 OPIOID USE DISORDER, SEVERE, DEPENDENCE: Primary | ICD-10-CM

## 2023-05-31 PROCEDURE — H0015 ALCOHOL AND/OR DRUG SERVICES: HCPCS | Performed by: NURSE PRACTITIONER

## 2023-05-31 NOTE — PROGRESS NOTES
"[facility] GROUP     Date: 05/29/2023  Name: Taye Ahmadi    Time In: 1800   Time Out: 2055     Number of participants: 12.    IOP GROUP NOTE     Data: 3 hour IOP group therapy session (Check-ins, Coping Skills, Relapse Prevention)     Check Ins: Therapist continued facilitation of rapport building strategies between group members. Therapist asked that each patient check in with home life and recovery efforts and identify triggers, cravings, and high risk situations that arise between group sessions. Therapist provided empathy and support during group session.     Session Content/Coping Skills: Introductions completed. Check ins completed by group members. Article “Why Values are Important to Living Sober and Clean” reviewed and discussed (retrieved from [employer], 2019). Group members completed Value Card Sort activity. Group members were asked to identify their very important, somewhat important, and not important values. Group members were asked to complete values discussion questions.        Response: Patient attended class in person. Patient participated in completion of check in form. Patient on check in form answered no to question \"currently or since last group meeting have you had any suicidal thoughts or plan or intent to hurt yourself”, and patient also answered no on check in form to question of \"currently or since your last group meeting, have you had any homicidal thoughts or plan or intent to hurt others\".     Personal Assessment 0-10 Scale (0-none, 10-high)    Anxiety:  2   Depression:  1   Cravings: 1     Assessment:     ..  Lab on 05/23/2023   Component Date Value Ref Range Status   • THC, Screen, Urine 05/23/2023 Negative  Negative Final   • Phencyclidine (PCP), Urine 05/23/2023 Negative  Negative Final   • Cocaine Screen, Urine 05/23/2023 Negative  Negative Final   • Methamphetamine, Ur 05/23/2023 Negative  Negative Final   • Opiate Screen 05/23/2023 Negative  Negative Final   • " Amphetamine Screen, Urine 05/23/2023 Negative  Negative Final   • Benzodiazepine Screen, Urine 05/23/2023 Negative  Negative Final   • Tricyclic Antidepressants Screen 05/23/2023 Negative  Negative Final   • Methadone Screen, Urine 05/23/2023 Negative  Negative Final   • Barbiturates Screen, Urine 05/23/2023 Negative  Negative Final   • Oxycodone Screen, Urine 05/23/2023 Negative  Negative Final   • Propoxyphene Screen 05/23/2023 Negative  Negative Final   • Buprenorphine, Screen, Urine 05/23/2023 Negative  Negative Final   • PH 05/23/2023 8.1  4.5 - 9 Final   • CREATININE 05/23/2023 42.9  20 - 300 mg/dL mg/dL Final   • SPECIFIC GRAVITY 05/23/2023 1.006  1.003 - 1.035 Final   • CODEINE 05/23/2023 Negative  50 ng/ml ng/ml Final   • HYDROCODONE 05/23/2023 Negative  50 ng/ml ng/ml Final   • NORHYDROCODONE 05/23/2023 Negative  50 ng/ml ng/ml Final   • HYDROMORPHONE 05/23/2023 Negative  50 ng/ml ng/ml Final   • MORPHINE 05/23/2023 Negative  50 ng/ml ng/ml Final   • FENTANYL 05/23/2023 Negative  2 ng/ml ng/ml Final   • NORFENTANYL 05/23/2023 Negative  10 ng/ml ng/ml Final   • METHADONE 05/23/2023 Negative  25 ng/ml ng/ml Final   • EDDP 05/23/2023 Negative  50 ng/ml ng/ml Final   • OXYCODONE 05/23/2023 Negative  50 ng/ml ng/ml Final   • NOROXYCODONE 05/23/2023 Negative  50 ng/ml ng/ml Final   • OXYMORPHONE 05/23/2023 Negative  50 ng/ml ng/ml Final   • TAPENTADOL 05/23/2023 Negative  50 ng/ml ng/ml Final   • TRAMADOL 05/23/2023 Negative  50 ng/ml ng/ml Final   • BUPRENORPHINE 05/23/2023 Negative  7.5 ng/ml ng/ml Final   • NORBUPRENORPHINE 05/23/2023 Negative  10 ng/ml ng/ml Final   • AMOBARBITAL 05/23/2023 Negative  100 ng/ml ng/ml Final   • BUTABARBITAL 05/23/2023 Negative  100 ng/ml ng/ml Final   • BUTALBITAL 05/23/2023 Negative  100 ng/ml ng/ml Final   • PHENOBARBITAL 05/23/2023 Negative  100 ng/ml ng/ml Final   • SECOBARBITAL 05/23/2023 Negative  100 ng/ml ng/ml Final   • ALPRAZOLAM 05/23/2023 Negative  50 ng/ml ng/ml  Final   • ALPHA-HYDROXYALPRAZOLAM 05/23/2023 Negative  50 ng/ml ng/ml Final   • CLONAZEPAM 05/23/2023 Negative  50 ng/ml ng/ml Final   • 7- AMINOCLONAZEPAM 05/23/2023 Negative  50 ng/ml ng/ml Final   • DIAZEPAM 05/23/2023 Negative  50 ng/ml ng/ml Final   • NORDIAZEPAM 05/23/2023 Negative  50 ng/ml ng/ml Final   • LORAZEPAM 05/23/2023 Negative  100 ng/ml ng/ml Final   • MIDAZOLAM 05/23/2023 Negative  50 ng/ml ng/ml Final   • ALPHA-HYDROXYMIDAZOLAM 05/23/2023 Negative  50 ng/ml ng/ml Final   • OXAZEPAM 05/23/2023 Negative  50 ng/ml ng/ml Final   • TEMAZEPAM 05/23/2023 Negative  50 ng/ml ng/ml Final   • ETG 05/23/2023 Negative  200 ng/ml ng/ml Final   • BENZOYLECGONINE 05/23/2023 Negative  100 ng/ml ng/ml Final   • 6-JEREMIAS 05/23/2023 Negative  10 ng/ml ng/ml Final   • MDMA 05/23/2023 Negative  100 ng/ml ng/ml Final   • PCP 05/23/2023 Negative  20 ng/ml ng/ml Final   • DELTA-9-THC 05/23/2023 Negative  20 ng/ml ng/ml Final   • AMPHETAMINE 05/23/2023 Negative  250 ng/ml ng/ml Final   • METHAMPHETAMINE 05/23/2023 Negative  100 ng/ml ng/ml Final   • METHYLPHENIDATE 05/23/2023 Negative  10 ng/ml ng/ml Final   • PHENTERMINE 05/23/2023 Negative  100 ng/ml ng/ml Final   • RITALINIC ACID 05/23/2023 Negative  50 ng/ml ng/ml Final   • CARISOPRODOL 05/23/2023 Negative  100 ng/ml ng/ml Final   • GABAPENTIN 05/23/2023 Negative  500 ng/ml ng/ml Final   • PREGABALIN 05/23/2023 Negative  250 ng/ml ng/ml Final   • ZOLPIDEM 05/23/2023 Negative  2 ng/ml ng/ml Final   • CARBOXYZOLPIDEM 05/23/2023 Negative  10 ng/ml ng/ml Final   Lab on 05/16/2023   Component Date Value Ref Range Status   • THC, Screen, Urine 05/16/2023 Negative  Negative Final   • Phencyclidine (PCP), Urine 05/16/2023 Negative  Negative Final   • Cocaine Screen, Urine 05/16/2023 Negative  Negative Final   • Methamphetamine, Ur 05/16/2023 Negative  Negative Final   • Opiate Screen 05/16/2023 Negative  Negative Final   • Amphetamine Screen, Urine 05/16/2023 Negative  Negative  Final   • Benzodiazepine Screen, Urine 05/16/2023 Negative  Negative Final   • Tricyclic Antidepressants Screen 05/16/2023 Negative  Negative Final   • Methadone Screen, Urine 05/16/2023 Negative  Negative Final   • Barbiturates Screen, Urine 05/16/2023 Negative  Negative Final   • Oxycodone Screen, Urine 05/16/2023 Negative  Negative Final   • Propoxyphene Screen 05/16/2023 Negative  Negative Final   • Buprenorphine, Screen, Urine 05/16/2023 Negative  Negative Final   • PH 05/16/2023 8.7  4.5 - 9 Final   • CREATININE 05/16/2023 93.5  20 - 300 mg/dL mg/dL Final   • SPECIFIC GRAVITY 05/16/2023 1.014  1.003 - 1.035 Final   • CODEINE 05/16/2023 Negative  50 ng/ml ng/ml Final   • HYDROCODONE 05/16/2023 Negative  50 ng/ml ng/ml Final   • NORHYDROCODONE 05/16/2023 Negative  50 ng/ml ng/ml Final   • HYDROMORPHONE 05/16/2023 Negative  50 ng/ml ng/ml Final   • MORPHINE 05/16/2023 Negative  50 ng/ml ng/ml Final   • FENTANYL 05/16/2023 Negative  2 ng/ml ng/ml Final   • NORFENTANYL 05/16/2023 Negative  10 ng/ml ng/ml Final   • METHADONE 05/16/2023 Negative  25 ng/ml ng/ml Final   • EDDP 05/16/2023 Negative  50 ng/ml ng/ml Final   • OXYCODONE 05/16/2023 Negative  50 ng/ml ng/ml Final   • NOROXYCODONE 05/16/2023 Negative  50 ng/ml ng/ml Final   • OXYMORPHONE 05/16/2023 Negative  50 ng/ml ng/ml Final   • TAPENTADOL 05/16/2023 Negative  50 ng/ml ng/ml Final   • TRAMADOL 05/16/2023 Negative  50 ng/ml ng/ml Final   • BUPRENORPHINE 05/16/2023 Negative  7.5 ng/ml ng/ml Final   • NORBUPRENORPHINE 05/16/2023 Negative  10 ng/ml ng/ml Final   • AMOBARBITAL 05/16/2023 Negative  100 ng/ml ng/ml Final   • BUTABARBITAL 05/16/2023 Negative  100 ng/ml ng/ml Final   • BUTALBITAL 05/16/2023 Negative  100 ng/ml ng/ml Final   • PHENOBARBITAL 05/16/2023 Negative  100 ng/ml ng/ml Final   • SECOBARBITAL 05/16/2023 Negative  100 ng/ml ng/ml Final   • ALPRAZOLAM 05/16/2023 Negative  50 ng/ml ng/ml Final   • ALPHA-HYDROXYALPRAZOLAM 05/16/2023 Negative  50  ng/ml ng/ml Final   • CLONAZEPAM 05/16/2023 Negative  50 ng/ml ng/ml Final   • 7- AMINOCLONAZEPAM 05/16/2023 Negative  50 ng/ml ng/ml Final   • DIAZEPAM 05/16/2023 Negative  50 ng/ml ng/ml Final   • NORDIAZEPAM 05/16/2023 Negative  50 ng/ml ng/ml Final   • LORAZEPAM 05/16/2023 Negative  100 ng/ml ng/ml Final   • MIDAZOLAM 05/16/2023 Negative  50 ng/ml ng/ml Final   • ALPHA-HYDROXYMIDAZOLAM 05/16/2023 Negative  50 ng/ml ng/ml Final   • OXAZEPAM 05/16/2023 Negative  50 ng/ml ng/ml Final   • TEMAZEPAM 05/16/2023 Negative  50 ng/ml ng/ml Final   • ETG 05/16/2023 Negative  200 ng/ml ng/ml Final   • BENZOYLECGONINE 05/16/2023 Negative  100 ng/ml ng/ml Final   • 6-JEREMIAS 05/16/2023 Negative  10 ng/ml ng/ml Final   • MDMA 05/16/2023 Negative  100 ng/ml ng/ml Final   • PCP 05/16/2023 Negative  20 ng/ml ng/ml Final   • DELTA-9-THC 05/16/2023 Negative  20 ng/ml ng/ml Final   • AMPHETAMINE 05/16/2023 Negative  250 ng/ml ng/ml Final   • METHAMPHETAMINE 05/16/2023 Negative  100 ng/ml ng/ml Final   • METHYLPHENIDATE 05/16/2023 Negative  10 ng/ml ng/ml Final   • PHENTERMINE 05/16/2023 Negative  100 ng/ml ng/ml Final   • RITALINIC ACID 05/16/2023 Negative  50 ng/ml ng/ml Final   • CARISOPRODOL 05/16/2023 Negative  100 ng/ml ng/ml Final   • GABAPENTIN 05/16/2023 Negative  500 ng/ml ng/ml Final   • PREGABALIN 05/16/2023 Negative  250 ng/ml ng/ml Final   • ZOLPIDEM 05/16/2023 Negative  2 ng/ml ng/ml Final   • CARBOXYZOLPIDEM 05/16/2023 Negative  10 ng/ml ng/ml Final   Lab on 05/09/2023   Component Date Value Ref Range Status   • THC, Screen, Urine 05/09/2023 Negative  Negative Final   • Phencyclidine (PCP), Urine 05/09/2023 Negative  Negative Final   • Cocaine Screen, Urine 05/09/2023 Negative  Negative Final   • Methamphetamine, Ur 05/09/2023 Negative  Negative Final   • Opiate Screen 05/09/2023 Negative  Negative Final   • Amphetamine Screen, Urine 05/09/2023 Negative  Negative Final   • Benzodiazepine Screen, Urine 05/09/2023 Negative   Negative Final   • Tricyclic Antidepressants Screen 05/09/2023 Negative  Negative Final   • Methadone Screen, Urine 05/09/2023 Negative  Negative Final   • Barbiturates Screen, Urine 05/09/2023 Negative  Negative Final   • Oxycodone Screen, Urine 05/09/2023 Negative  Negative Final   • Propoxyphene Screen 05/09/2023 Negative  Negative Final   • Buprenorphine, Screen, Urine 05/09/2023 Negative  Negative Final   • CODEINE 05/09/2023 Negative  50 ng/ml ng/ml Final   • HYDROCODONE 05/09/2023 Negative  50 ng/ml ng/ml Final   • NORHYDROCODONE 05/09/2023 Negative  50 ng/ml ng/ml Final   • HYDROMORPHONE 05/09/2023 Negative  50 ng/ml ng/ml Final   • MORPHINE 05/09/2023 Negative  50 ng/ml ng/ml Final   • FENTANYL 05/09/2023 Negative  2 ng/ml ng/ml Final   • NORFENTANYL 05/09/2023 Negative  10 ng/ml ng/ml Final   • METHADONE 05/09/2023 Negative  25 ng/ml ng/ml Final   • EDDP 05/09/2023 Negative  50 ng/ml ng/ml Final   • OXYCODONE 05/09/2023 Negative  50 ng/ml ng/ml Final   • NOROXYCODONE 05/09/2023 Negative  50 ng/ml ng/ml Final   • OXYMORPHONE 05/09/2023 Negative  50 ng/ml ng/ml Final   • TAPENTADOL 05/09/2023 Negative  50 ng/ml ng/ml Final   • TRAMADOL 05/09/2023 Negative  50 ng/ml ng/ml Final   • BUPRENORPHINE 05/09/2023 Negative  7.5 ng/ml ng/ml Final   • NORBUPRENORPHINE 05/09/2023 Negative  10 ng/ml ng/ml Final   • AMOBARBITAL 05/09/2023 Negative  100 ng/ml ng/ml Final   • BUTABARBITAL 05/09/2023 Negative  100 ng/ml ng/ml Final   • BUTALBITAL 05/09/2023 Negative  100 ng/ml ng/ml Final   • PHENOBARBITAL 05/09/2023 Negative  100 ng/ml ng/ml Final   • SECOBARBITAL 05/09/2023 Negative  100 ng/ml ng/ml Final   • ALPRAZOLAM 05/09/2023 Negative  50 ng/ml ng/ml Final   • ALPHA-HYDROXYALPRAZOLAM 05/09/2023 Negative  50 ng/ml ng/ml Final   • CLONAZEPAM 05/09/2023 Negative  50 ng/ml ng/ml Final   • 7- AMINOCLONAZEPAM 05/09/2023 Negative  50 ng/ml ng/ml Final   • DIAZEPAM 05/09/2023 Negative  50 ng/ml ng/ml Final   • NORDIAZEPAM  05/09/2023 Negative  50 ng/ml ng/ml Final   • LORAZEPAM 05/09/2023 Negative  100 ng/ml ng/ml Final   • MIDAZOLAM 05/09/2023 Negative  50 ng/ml ng/ml Final   • ALPHA-HYDROXYMIDAZOLAM 05/09/2023 Negative  50 ng/ml ng/ml Final   • OXAZEPAM 05/09/2023 Negative  50 ng/ml ng/ml Final   • TEMAZEPAM 05/09/2023 Negative  50 ng/ml ng/ml Final   • ETG 05/09/2023 Negative  200 ng/ml ng/ml Final   • BENZOYLECGONINE 05/09/2023 Negative  100 ng/ml ng/ml Final   • 6-JEREMIAS 05/09/2023 Negative  10 ng/ml ng/ml Final   • MDMA 05/09/2023 Negative  100 ng/ml ng/ml Final   • PCP 05/09/2023 Negative  20 ng/ml ng/ml Final   • DELTA-9-THC 05/09/2023 Negative  20 ng/ml ng/ml Final   • AMPHETAMINE 05/09/2023 Negative  250 ng/ml ng/ml Final   • METHAMPHETAMINE 05/09/2023 Negative  100 ng/ml ng/ml Final   • METHYLPHENIDATE 05/09/2023 Negative  10 ng/ml ng/ml Final   • PHENTERMINE 05/09/2023 Negative  100 ng/ml ng/ml Final   • RITALINIC ACID 05/09/2023 Negative  50 ng/ml ng/ml Final   • CARISOPRODOL 05/09/2023 Negative  100 ng/ml ng/ml Final   • GABAPENTIN 05/09/2023 Negative  500 ng/ml ng/ml Final   • PREGABALIN 05/09/2023 Negative  250 ng/ml ng/ml Final   • ZOLPIDEM 05/09/2023 Negative  2 ng/ml ng/ml Final   • CARBOXYZOLPIDEM 05/09/2023 Negative  10 ng/ml ng/ml Final   • PH 05/09/2023 8.3  4.5 - 9 Final   • CREATININE 05/09/2023 21.7  20 - 300 mg/dL mg/dL Final   • SPECIFIC GRAVITY 05/09/2023 1.002  1.003 - 1.035 Final       Mental Status Exam  Hygiene:  good  Dress: casual  Attitude: cooperative and agreeable   Motor Activity: appropriate  Eye Contact:  good  Speech: regular rate and rhythm   Mood:  calm and cooperative  Affect:  Appropriate  Thought Processes:  Linear  Thought Content:  Normal  Suicidal Thoughts:  denies  Homicidal Thoughts:  denies  Crisis Safety Plan: Safety plan has been discussed.   Hallucinations:  Unknown to clinician.   Reliability: fair  Insight: fair  Judgement: fair  Impulse Control: fair    Recovery/spiritual support  group attendance: No.      Progress toward goal: Not at goal    Prognosis: Fair with Ongoing Treatment     Self-reported number of days sober: Patient reported 74 on check in form.     Patient will contact this office, call 911 or present to the nearest emergency room should suicidal or homicidal ideations occur.    Impression/Formulation:    ICD-10-CM ICD-9-CM   1. Opioid use disorder, severe, dependence  F11.20 304.00       Clinical Maneuvering/Interventions: Therapist utilized a person-centered approach to build rapport with group member. Therapist implemented motivational interviewing techniques to assist client with exploring and resolving ambivalence associated with commitment to change behaviors related to substance use and addiction. Therapist applied cognitive behavioral strategies to facilitate identification of maladaptive patterns of thinking and behavior that contribute to client's risk for continued substance use and relapse. Therapist employed group interaction activities to build rapport among group members, promote sobriety, and emphasize relapse prevention. Therapist promoted safe nonjudgmental environment by providing group members with unconditional positive regard and encouraging group members to comply with group rules and guidelines. Therapist assisted group member with identifying and implementing healthier coping strategies.      Plan:  Continue [facility] IOP Phase I   Aftercare:  [facility] Phase II  Program Assignments:  Personal recovery plan, relapse prevention plan, attendance of recovery support group meetings, exploration of sponsorship, drug/alcohol screens.     Ta Ambrose LCSW  5/31/2023  10:25 EDT

## 2023-06-01 ENCOUNTER — OFFICE VISIT (OUTPATIENT)
Dept: PSYCHIATRY | Facility: HOSPITAL | Age: 38
End: 2023-06-01
Payer: MEDICAID

## 2023-06-01 DIAGNOSIS — F11.20 OPIOID USE DISORDER, SEVERE, DEPENDENCE: Primary | ICD-10-CM

## 2023-06-01 PROCEDURE — H0015 ALCOHOL AND/OR DRUG SERVICES: HCPCS | Performed by: NURSE PRACTITIONER

## 2023-06-01 NOTE — PROGRESS NOTES
"[facility] IOP GROUP     Date: 05/31/2023  Name: Taye Ahmadi    Time In: 1800   Time Out: 2045     Number of participants: 9.    IOP GROUP NOTE     Data: 3 hour IOP group therapy session (Check-ins, Coping Skills, Relapse Prevention)     Check Ins: Therapist continued facilitation of rapport building strategies between group members. Therapist asked that each patient check in with home life and recovery efforts and identify triggers, cravings, and high risk situations that arise between group sessions. Therapist provided empathy and support during group session.     Session Content/Coping Skills: Check ins completed by group members. , Annemarie, joined for first part of meeting. Gian,  at [facility] also joined for first part of meeting.  shared Just for Today reading. Psychoeducational material titled “mandated” reviewed and discussed (retrieved from taking the escalator website). Group members shared relapse prevention plans with group. Group members were given the opportunity to seek feedback from the group regarding relapse prevention plans. Substance Abuse Crossword activity completed by group members (crossword by [facility] [employer]).      Response: Patient attended class in person. Patient participated in completion of check in form. Patient on check in form answered no to question \"currently or since last group meeting have you had any suicidal thoughts or plan or intent to hurt yourself”, and patient also answered no on check in form to question of \"currently or since your last group meeting, have you had any homicidal thoughts or plan or intent to hurt others\".     Personal Assessment 0-10 Scale (0-none, 10-high)    Anxiety:  2   Depression:  1   Cravings: 1     Assessment:     ..  Admission on 05/31/2023, Discharged on 05/31/2023   Component Date Value Ref Range Status   • SARS Antigen " 05/31/2023 Not Detected  Not Detected, Presumptive Negative Final   • Influenza A Antigen DULCE 05/31/2023 Not Detected  Not Detected Final   • Influenza B Antigen DULCE 05/31/2023 Not Detected  Not Detected Final   • Internal Control 05/31/2023 Passed  Passed Final   • Lot Number 05/31/2023 [id number]   Final   • Expiration Date 05/31/2023 03/23/2024   Final   • Rapid Strep A Screen 05/31/2023 Negative   Final   • Internal Control 05/31/2023 Passed   Final   • Lot Number 05/31/2023 [id number]   Final   • Expiration Date 05/31/2023 09/30/2024   Final   Lab on 05/30/2023   Component Date Value Ref Range Status   • THC, Screen, Urine 05/30/2023 Negative  Negative Final   • Phencyclidine (PCP), Urine 05/30/2023 Negative  Negative Final   • Cocaine Screen, Urine 05/30/2023 Negative  Negative Final   • Methamphetamine, Ur 05/30/2023 Negative  Negative Final   • Opiate Screen 05/30/2023 Negative  Negative Final   • Amphetamine Screen, Urine 05/30/2023 Negative  Negative Final   • Benzodiazepine Screen, Urine 05/30/2023 Negative  Negative Final   • Tricyclic Antidepressants Screen 05/30/2023 Negative  Negative Final   • Methadone Screen, Urine 05/30/2023 Negative  Negative Final   • Barbiturates Screen, Urine 05/30/2023 Negative  Negative Final   • Oxycodone Screen, Urine 05/30/2023 Negative  Negative Final   • Propoxyphene Screen 05/30/2023 Negative  Negative Final   • Buprenorphine, Screen, Urine 05/30/2023 Negative  Negative Final   Lab on 05/23/2023   Component Date Value Ref Range Status   • THC, Screen, Urine 05/23/2023 Negative  Negative Final   • Phencyclidine (PCP), Urine 05/23/2023 Negative  Negative Final   • Cocaine Screen, Urine 05/23/2023 Negative  Negative Final   • Methamphetamine, Ur 05/23/2023 Negative  Negative Final   • Opiate Screen 05/23/2023 Negative  Negative Final   • Amphetamine Screen, Urine 05/23/2023 Negative  Negative Final   • Benzodiazepine Screen, Urine 05/23/2023 Negative  Negative Final   •  Tricyclic Antidepressants Screen 05/23/2023 Negative  Negative Final   • Methadone Screen, Urine 05/23/2023 Negative  Negative Final   • Barbiturates Screen, Urine 05/23/2023 Negative  Negative Final   • Oxycodone Screen, Urine 05/23/2023 Negative  Negative Final   • Propoxyphene Screen 05/23/2023 Negative  Negative Final   • Buprenorphine, Screen, Urine 05/23/2023 Negative  Negative Final   • PH 05/23/2023 8.1  4.5 - 9 Final   • CREATININE 05/23/2023 42.9  20 - 300 mg/dL mg/dL Final   • SPECIFIC GRAVITY 05/23/2023 1.006  1.003 - 1.035 Final   • CODEINE 05/23/2023 Negative  50 ng/ml ng/ml Final   • HYDROCODONE 05/23/2023 Negative  50 ng/ml ng/ml Final   • NORHYDROCODONE 05/23/2023 Negative  50 ng/ml ng/ml Final   • HYDROMORPHONE 05/23/2023 Negative  50 ng/ml ng/ml Final   • MORPHINE 05/23/2023 Negative  50 ng/ml ng/ml Final   • FENTANYL 05/23/2023 Negative  2 ng/ml ng/ml Final   • NORFENTANYL 05/23/2023 Negative  10 ng/ml ng/ml Final   • METHADONE 05/23/2023 Negative  25 ng/ml ng/ml Final   • EDDP 05/23/2023 Negative  50 ng/ml ng/ml Final   • OXYCODONE 05/23/2023 Negative  50 ng/ml ng/ml Final   • NOROXYCODONE 05/23/2023 Negative  50 ng/ml ng/ml Final   • OXYMORPHONE 05/23/2023 Negative  50 ng/ml ng/ml Final   • TAPENTADOL 05/23/2023 Negative  50 ng/ml ng/ml Final   • TRAMADOL 05/23/2023 Negative  50 ng/ml ng/ml Final   • BUPRENORPHINE 05/23/2023 Negative  7.5 ng/ml ng/ml Final   • NORBUPRENORPHINE 05/23/2023 Negative  10 ng/ml ng/ml Final   • AMOBARBITAL 05/23/2023 Negative  100 ng/ml ng/ml Final   • BUTABARBITAL 05/23/2023 Negative  100 ng/ml ng/ml Final   • BUTALBITAL 05/23/2023 Negative  100 ng/ml ng/ml Final   • PHENOBARBITAL 05/23/2023 Negative  100 ng/ml ng/ml Final   • SECOBARBITAL 05/23/2023 Negative  100 ng/ml ng/ml Final   • ALPRAZOLAM 05/23/2023 Negative  50 ng/ml ng/ml Final   • ALPHA-HYDROXYALPRAZOLAM 05/23/2023 Negative  50 ng/ml ng/ml Final   • CLONAZEPAM 05/23/2023 Negative  50 ng/ml ng/ml Final   •  7- AMINOCLONAZEPAM 05/23/2023 Negative  50 ng/ml ng/ml Final   • DIAZEPAM 05/23/2023 Negative  50 ng/ml ng/ml Final   • NORDIAZEPAM 05/23/2023 Negative  50 ng/ml ng/ml Final   • LORAZEPAM 05/23/2023 Negative  100 ng/ml ng/ml Final   • MIDAZOLAM 05/23/2023 Negative  50 ng/ml ng/ml Final   • ALPHA-HYDROXYMIDAZOLAM 05/23/2023 Negative  50 ng/ml ng/ml Final   • OXAZEPAM 05/23/2023 Negative  50 ng/ml ng/ml Final   • TEMAZEPAM 05/23/2023 Negative  50 ng/ml ng/ml Final   • ETG 05/23/2023 Negative  200 ng/ml ng/ml Final   • BENZOYLECGONINE 05/23/2023 Negative  100 ng/ml ng/ml Final   • 6-JEREMIAS 05/23/2023 Negative  10 ng/ml ng/ml Final   • MDMA 05/23/2023 Negative  100 ng/ml ng/ml Final   • PCP 05/23/2023 Negative  20 ng/ml ng/ml Final   • DELTA-9-THC 05/23/2023 Negative  20 ng/ml ng/ml Final   • AMPHETAMINE 05/23/2023 Negative  250 ng/ml ng/ml Final   • METHAMPHETAMINE 05/23/2023 Negative  100 ng/ml ng/ml Final   • METHYLPHENIDATE 05/23/2023 Negative  10 ng/ml ng/ml Final   • PHENTERMINE 05/23/2023 Negative  100 ng/ml ng/ml Final   • RITALINIC ACID 05/23/2023 Negative  50 ng/ml ng/ml Final   • CARISOPRODOL 05/23/2023 Negative  100 ng/ml ng/ml Final   • GABAPENTIN 05/23/2023 Negative  500 ng/ml ng/ml Final   • PREGABALIN 05/23/2023 Negative  250 ng/ml ng/ml Final   • ZOLPIDEM 05/23/2023 Negative  2 ng/ml ng/ml Final   • CARBOXYZOLPIDEM 05/23/2023 Negative  10 ng/ml ng/ml Final   Lab on 05/16/2023   Component Date Value Ref Range Status   • THC, Screen, Urine 05/16/2023 Negative  Negative Final   • Phencyclidine (PCP), Urine 05/16/2023 Negative  Negative Final   • Cocaine Screen, Urine 05/16/2023 Negative  Negative Final   • Methamphetamine, Ur 05/16/2023 Negative  Negative Final   • Opiate Screen 05/16/2023 Negative  Negative Final   • Amphetamine Screen, Urine 05/16/2023 Negative  Negative Final   • Benzodiazepine Screen, Urine 05/16/2023 Negative  Negative Final   • Tricyclic Antidepressants Screen 05/16/2023 Negative   Negative Final   • Methadone Screen, Urine 05/16/2023 Negative  Negative Final   • Barbiturates Screen, Urine 05/16/2023 Negative  Negative Final   • Oxycodone Screen, Urine 05/16/2023 Negative  Negative Final   • Propoxyphene Screen 05/16/2023 Negative  Negative Final   • Buprenorphine, Screen, Urine 05/16/2023 Negative  Negative Final   • PH 05/16/2023 8.7  4.5 - 9 Final   • CREATININE 05/16/2023 93.5  20 - 300 mg/dL mg/dL Final   • SPECIFIC GRAVITY 05/16/2023 1.014  1.003 - 1.035 Final   • CODEINE 05/16/2023 Negative  50 ng/ml ng/ml Final   • HYDROCODONE 05/16/2023 Negative  50 ng/ml ng/ml Final   • NORHYDROCODONE 05/16/2023 Negative  50 ng/ml ng/ml Final   • HYDROMORPHONE 05/16/2023 Negative  50 ng/ml ng/ml Final   • MORPHINE 05/16/2023 Negative  50 ng/ml ng/ml Final   • FENTANYL 05/16/2023 Negative  2 ng/ml ng/ml Final   • NORFENTANYL 05/16/2023 Negative  10 ng/ml ng/ml Final   • METHADONE 05/16/2023 Negative  25 ng/ml ng/ml Final   • EDDP 05/16/2023 Negative  50 ng/ml ng/ml Final   • OXYCODONE 05/16/2023 Negative  50 ng/ml ng/ml Final   • NOROXYCODONE 05/16/2023 Negative  50 ng/ml ng/ml Final   • OXYMORPHONE 05/16/2023 Negative  50 ng/ml ng/ml Final   • TAPENTADOL 05/16/2023 Negative  50 ng/ml ng/ml Final   • TRAMADOL 05/16/2023 Negative  50 ng/ml ng/ml Final   • BUPRENORPHINE 05/16/2023 Negative  7.5 ng/ml ng/ml Final   • NORBUPRENORPHINE 05/16/2023 Negative  10 ng/ml ng/ml Final   • AMOBARBITAL 05/16/2023 Negative  100 ng/ml ng/ml Final   • BUTABARBITAL 05/16/2023 Negative  100 ng/ml ng/ml Final   • BUTALBITAL 05/16/2023 Negative  100 ng/ml ng/ml Final   • PHENOBARBITAL 05/16/2023 Negative  100 ng/ml ng/ml Final   • SECOBARBITAL 05/16/2023 Negative  100 ng/ml ng/ml Final   • ALPRAZOLAM 05/16/2023 Negative  50 ng/ml ng/ml Final   • ALPHA-HYDROXYALPRAZOLAM 05/16/2023 Negative  50 ng/ml ng/ml Final   • CLONAZEPAM 05/16/2023 Negative  50 ng/ml ng/ml Final   • 7- AMINOCLONAZEPAM 05/16/2023 Negative  50 ng/ml  ng/ml Final   • DIAZEPAM 05/16/2023 Negative  50 ng/ml ng/ml Final   • NORDIAZEPAM 05/16/2023 Negative  50 ng/ml ng/ml Final   • LORAZEPAM 05/16/2023 Negative  100 ng/ml ng/ml Final   • MIDAZOLAM 05/16/2023 Negative  50 ng/ml ng/ml Final   • ALPHA-HYDROXYMIDAZOLAM 05/16/2023 Negative  50 ng/ml ng/ml Final   • OXAZEPAM 05/16/2023 Negative  50 ng/ml ng/ml Final   • TEMAZEPAM 05/16/2023 Negative  50 ng/ml ng/ml Final   • ETG 05/16/2023 Negative  200 ng/ml ng/ml Final   • BENZOYLECGONINE 05/16/2023 Negative  100 ng/ml ng/ml Final   • 6-JEREMIAS 05/16/2023 Negative  10 ng/ml ng/ml Final   • MDMA 05/16/2023 Negative  100 ng/ml ng/ml Final   • PCP 05/16/2023 Negative  20 ng/ml ng/ml Final   • DELTA-9-THC 05/16/2023 Negative  20 ng/ml ng/ml Final   • AMPHETAMINE 05/16/2023 Negative  250 ng/ml ng/ml Final   • METHAMPHETAMINE 05/16/2023 Negative  100 ng/ml ng/ml Final   • METHYLPHENIDATE 05/16/2023 Negative  10 ng/ml ng/ml Final   • PHENTERMINE 05/16/2023 Negative  100 ng/ml ng/ml Final   • RITALINIC ACID 05/16/2023 Negative  50 ng/ml ng/ml Final   • CARISOPRODOL 05/16/2023 Negative  100 ng/ml ng/ml Final   • GABAPENTIN 05/16/2023 Negative  500 ng/ml ng/ml Final   • PREGABALIN 05/16/2023 Negative  250 ng/ml ng/ml Final   • ZOLPIDEM 05/16/2023 Negative  2 ng/ml ng/ml Final   • CARBOXYZOLPIDEM 05/16/2023 Negative  10 ng/ml ng/ml Final       Mental Status Exam  Hygiene:  good  Dress: casual  Attitude: cooperative and agreeable   Motor Activity: appropriate  Eye Contact:  good  Speech: regular rate and rhythm   Mood:  calm and cooperative  Affect:  Appropriate  Thought Processes:  Linear  Thought Content:  Normal  Suicidal Thoughts:  denies  Homicidal Thoughts:  denies  Crisis Safety Plan: Safety plan has been discussed.   Hallucinations:  Unknown to clinician.   Reliability: fair  Insight: fair  Judgement: fair  Impulse Control: fair    Recovery/spiritual support group attendance: No.      Progress toward goal: Not at  goal    Prognosis: Fair with Ongoing Treatment     Self-reported number of days sober: Patient reported 76 on check in form.     Patient will contact this office, call 911 or present to the nearest emergency room should suicidal or homicidal ideations occur.    Impression/Formulation:    ICD-10-CM ICD-9-CM   1. Opioid use disorder, severe, dependence  F11.20 304.00       Clinical Maneuvering/Interventions: Therapist utilized a person-centered approach to build rapport with group member. Therapist implemented motivational interviewing techniques to assist client with exploring and resolving ambivalence associated with commitment to change behaviors related to substance use and addiction. Therapist applied cognitive behavioral strategies to facilitate identification of maladaptive patterns of thinking and behavior that contribute to client's risk for continued substance use and relapse. Therapist employed group interaction activities to build rapport among group members, promote sobriety, and emphasize relapse prevention. Therapist promoted safe nonjudgmental environment by providing group members with unconditional positive regard and encouraging group members to comply with group rules and guidelines. Therapist assisted group member with identifying and implementing healthier coping strategies.      Plan:  Continue [facility] IOP Phase I   Aftercare:  [facility] Phase II  Program Assignments:  Personal recovery plan, relapse prevention plan, attendance of recovery support group meetings, exploration of sponsorship, drug/alcohol screens.     Ta Ambrose LCSW  6/1/2023  14:20 EDT

## 2023-06-02 LAB
1OH-MIDAZOLAM UR CFM-MCNC: NEGATIVE NG/ML
6MAM UR CFM-MCNC: NEGATIVE NG/ML
7AMINOCLONAZEPAM UR CFM-MCNC: NEGATIVE NG/ML
7AMINOCLONAZEPAM UR CFM-MCNC: NEGATIVE NG/ML
A-OH ALPRAZ UR CFM-MCNC: NEGATIVE NG/ML
ALPRAZ UR CFM-MCNC: NEGATIVE NG/ML
AMOBARBITAL UR CFM-MCNC: NEGATIVE NG/ML
AMPHET UR CFM-MCNC: NEGATIVE NG/ML
BUPRENORPHINE UR-MCNC: NEGATIVE NG/ML
BUTABARBITAL UR CFM-MCNC: NEGATIVE NG/ML
BUTALBITAL UR CFM-MCNC: NEGATIVE NG/ML
BZE UR CFM-MCNC: NEGATIVE NG/ML
CARBOXYTHC UR CFM-MCNC: NEGATIVE NG/ML
CARISOPRODOL UR CFM-MCNC: NEGATIVE NG/ML
CODEINE UR CFM-MCNC: NEGATIVE NG/ML
CREATININE: 62.7 MG/DL
DIAZEPAM UR CFM-MCNC: NEGATIVE NG/ML
EDDP UR CFM-MCNC: NEGATIVE NG/ML
ETHYL GLUCURONIDE UR CFM-MCNC: NEGATIVE NG/ML
FENTANYL UR-MCNC: NEGATIVE NG/ML
GABAPENTIN UR CFM-MCNC: NEGATIVE NG/ML
HYDROCODONE UR CFM-MCNC: NEGATIVE NG/ML
HYDROMORPHONE UR CFM-MCNC: NEGATIVE NG/ML
LORAZEPAM UR CFM-MCNC: NEGATIVE NG/ML
MDMA UR CFM-MCNC: NEGATIVE NG/ML
ME-PHENIDATE UR CFM-MCNC: NEGATIVE NG/ML
METHADONE UR CFM-MCNC: NEGATIVE NG/ML
METHAMPHET UR CFM-MCNC: NEGATIVE NG/ML
MIDAZOLAM UR CFM-MCNC: NEGATIVE NG/ML
MORPHINE UR CFM-MCNC: NEGATIVE NG/ML
NORBUPRENORPHINE UR CFM-MCNC: NEGATIVE NG/ML
NORDIAZEPAM UR CFM-MCNC: NEGATIVE NG/ML
NORFENTANYL UR CFM-MCNC: NEGATIVE NG/ML
NORHYDROCODONE UR CFM-MCNC: NEGATIVE NG/ML
NOROXYCODONE UR CFM-MCNC: NEGATIVE NG/ML
OXAZEPAM CTO UR CFM-MCNC: NEGATIVE NG/ML
OXYCODONE SERPL-MCNC: NEGATIVE NG/ML
OXYMORPHONE SERPL-MCNC: NEGATIVE NG/ML
PCP UR CFM-MCNC: NEGATIVE NG/ML
PH: 8.4 (ref 4.5–9)
PHENOBARB UR CFM-MCNC: NEGATIVE NG/ML
PHENTERMINE: NEGATIVE NG/ML
PPAA UR CFM-MCNC: NEGATIVE NG/ML
PREGABALIN UR CFM-MCNC: NEGATIVE NG/ML
SECOBARBITAL UR CFM-MCNC: NEGATIVE NG/ML
SPECIFIC GRAVITY: 1.01 (ref 1–1.03)
TAPENTADOL UR CFM-MCNC: NEGATIVE NG/ML
TEMAZEPAM UR CFM-MCNC: NEGATIVE NG/ML
TRAMADOL: NEGATIVE NG/ML
ZOLPIDEM PHENYL-4-CARB UR CFM-MCNC: NEGATIVE NG/ML
ZOLPIDEM UR CFM-MCNC: NEGATIVE NG/ML

## 2023-06-05 ENCOUNTER — OFFICE VISIT (OUTPATIENT)
Dept: PSYCHIATRY | Facility: HOSPITAL | Age: 38
End: 2023-06-05
Payer: MEDICAID

## 2023-06-05 DIAGNOSIS — F11.20 OPIOID USE DISORDER, SEVERE, DEPENDENCE: Primary | ICD-10-CM

## 2023-06-05 PROCEDURE — H0015 ALCOHOL AND/OR DRUG SERVICES: HCPCS | Performed by: NURSE PRACTITIONER

## 2023-06-05 NOTE — PROGRESS NOTES
"CD IOP GROUP     Date: 06/01/2023  Name: Taye Ahmadi    Time In: 1800   Time Out: 2055.     Number of participants: 10.    IOP GROUP NOTE     Data: 3 hour IOP group therapy session (Check-ins, Coping Skills, Relapse Prevention)     Check Ins: Therapist continued facilitation of rapport building strategies between group members. Therapist asked that each patient check in with home life and recovery efforts and identify triggers, cravings, and high risk situations that arise between group sessions. Therapist provided empathy and support during group session.     Session Content/Coping Skills: , Annemarie, and [facility]  Gian joined for first part of meeting. ELLIS Reed, also attended for first part of meeting. Check ins completed by group members. Clinician provided group members with [facility] Therapy Resources sheet as well as [facility] Crisis and Helpline Resources sheet. Clinician answered group members questions regarding individual therapy and explained mandatory reporting requirements. Individual check ins completed with group members ( Annemarie,  Adam, and ELLIS Reed also present for individual check ins). Article “Understanding and Coping with Guilt and Shame in Recovery” (pages 1 and 3) reviewed and discussed (Anthony, Retrieved from Taking the [employer] website). “Life” Jeopardy group activity completed.       Response: Patient attended class in person. Patient participated in completion of check in form. Patient on check in form answered no to question \"currently or since last group meeting have you had any suicidal thoughts or plan or intent to hurt yourself”, and patient also answered no on check in form to question of \"currently or since your last group meeting, have you had any homicidal thoughts or plan or intent to hurt others\".     Personal " Assessment 0-10 Scale (0-none, 10-high)    Anxiety:  2   Depression:  1   Cravings: 1     Assessment:     ..  Admission on 05/31/2023, Discharged on 05/31/2023   Component Date Value Ref Range Status    SARS Antigen 05/31/2023 Not Detected  Not Detected, Presumptive Negative Final    Influenza A Antigen DULCE 05/31/2023 Not Detected  Not Detected Final    Influenza B Antigen DULCE 05/31/2023 Not Detected  Not Detected Final    Internal Control 05/31/2023 Passed  Passed Final    Lot Number 05/31/2023 [id number]   Final    Expiration Date 05/31/2023 03/23/2024   Final    Rapid Strep A Screen 05/31/2023 Negative   Final    Internal Control 05/31/2023 Passed   Final    Lot Number 05/31/2023 [id number]   Final    Expiration Date 05/31/2023 09/30/2024   Final   Lab on 05/30/2023   Component Date Value Ref Range Status    THC, Screen, Urine 05/30/2023 Negative  Negative Final    Phencyclidine (PCP), Urine 05/30/2023 Negative  Negative Final    Cocaine Screen, Urine 05/30/2023 Negative  Negative Final    Methamphetamine, Ur 05/30/2023 Negative  Negative Final    Opiate Screen 05/30/2023 Negative  Negative Final    Amphetamine Screen, Urine 05/30/2023 Negative  Negative Final    Benzodiazepine Screen, Urine 05/30/2023 Negative  Negative Final    Tricyclic Antidepressants Screen 05/30/2023 Negative  Negative Final    Methadone Screen, Urine 05/30/2023 Negative  Negative Final    Barbiturates Screen, Urine 05/30/2023 Negative  Negative Final    Oxycodone Screen, Urine 05/30/2023 Negative  Negative Final    Propoxyphene Screen 05/30/2023 Negative  Negative Final    Buprenorphine, Screen, Urine 05/30/2023 Negative  Negative Final    PH 05/30/2023 8.4  4.5 - 9 Final    CREATININE 05/30/2023 62.7  20 - 300 mg/dL mg/dL Final    SPECIFIC GRAVITY 05/30/2023 1.010  1.003 - 1.035 Final    CODEINE 05/30/2023 Negative  50 ng/ml ng/ml Final    HYDROCODONE 05/30/2023 Negative  50 ng/ml ng/ml Final    NORHYDROCODONE 05/30/2023 Negative  50 ng/ml  ng/ml Final    HYDROMORPHONE 05/30/2023 Negative  50 ng/ml ng/ml Final    MORPHINE 05/30/2023 Negative  50 ng/ml ng/ml Final    FENTANYL 05/30/2023 Negative  2 ng/ml ng/ml Final    NORFENTANYL 05/30/2023 Negative  10 ng/ml ng/ml Final    METHADONE 05/30/2023 Negative  25 ng/ml ng/ml Final    EDDP 05/30/2023 Negative  50 ng/ml ng/ml Final    OXYCODONE 05/30/2023 Negative  50 ng/ml ng/ml Final    NOROXYCODONE 05/30/2023 Negative  50 ng/ml ng/ml Final    OXYMORPHONE 05/30/2023 Negative  50 ng/ml ng/ml Final    TAPENTADOL 05/30/2023 Negative  50 ng/ml ng/ml Final    TRAMADOL 05/30/2023 Negative  50 ng/ml ng/ml Final    BUPRENORPHINE 05/30/2023 Negative  7.5 ng/ml ng/ml Final    NORBUPRENORPHINE 05/30/2023 Negative  10 ng/ml ng/ml Final    AMOBARBITAL 05/30/2023 Negative  100 ng/ml ng/ml Final    BUTABARBITAL 05/30/2023 Negative  100 ng/ml ng/ml Final    BUTALBITAL 05/30/2023 Negative  100 ng/ml ng/ml Final    PHENOBARBITAL 05/30/2023 Negative  100 ng/ml ng/ml Final    SECOBARBITAL 05/30/2023 Negative  100 ng/ml ng/ml Final    ALPRAZOLAM 05/30/2023 Negative  50 ng/ml ng/ml Final    ALPHA-HYDROXYALPRAZOLAM 05/30/2023 Negative  50 ng/ml ng/ml Final    CLONAZEPAM 05/30/2023 Negative  50 ng/ml ng/ml Final    7- AMINOCLONAZEPAM 05/30/2023 Negative  50 ng/ml ng/ml Final    DIAZEPAM 05/30/2023 Negative  50 ng/ml ng/ml Final    NORDIAZEPAM 05/30/2023 Negative  50 ng/ml ng/ml Final    LORAZEPAM 05/30/2023 Negative  100 ng/ml ng/ml Final    MIDAZOLAM 05/30/2023 Negative  50 ng/ml ng/ml Final    ALPHA-HYDROXYMIDAZOLAM 05/30/2023 Negative  50 ng/ml ng/ml Final    OXAZEPAM 05/30/2023 Negative  50 ng/ml ng/ml Final    TEMAZEPAM 05/30/2023 Negative  50 ng/ml ng/ml Final    ETG 05/30/2023 Negative  200 ng/ml ng/ml Final    BENZOYLECGONINE 05/30/2023 Negative  100 ng/ml ng/ml Final    6-JEREMIAS 05/30/2023 Negative  10 ng/ml ng/ml Final    MDMA 05/30/2023 Negative  100 ng/ml ng/ml Final    PCP 05/30/2023 Negative  20 ng/ml ng/ml Final     DELTA-9-THC 05/30/2023 Negative  20 ng/ml ng/ml Final    AMPHETAMINE 05/30/2023 Negative  250 ng/ml ng/ml Final    METHAMPHETAMINE 05/30/2023 Negative  100 ng/ml ng/ml Final    METHYLPHENIDATE 05/30/2023 Negative  10 ng/ml ng/ml Final    PHENTERMINE 05/30/2023 Negative  100 ng/ml ng/ml Final    RITALINIC ACID 05/30/2023 Negative  50 ng/ml ng/ml Final    CARISOPRODOL 05/30/2023 Negative  100 ng/ml ng/ml Final    GABAPENTIN 05/30/2023 Negative  500 ng/ml ng/ml Final    PREGABALIN 05/30/2023 Negative  250 ng/ml ng/ml Final    ZOLPIDEM 05/30/2023 Negative  2 ng/ml ng/ml Final    CARBOXYZOLPIDEM 05/30/2023 Negative  10 ng/ml ng/ml Final   Lab on 05/23/2023   Component Date Value Ref Range Status    THC, Screen, Urine 05/23/2023 Negative  Negative Final    Phencyclidine (PCP), Urine 05/23/2023 Negative  Negative Final    Cocaine Screen, Urine 05/23/2023 Negative  Negative Final    Methamphetamine, Ur 05/23/2023 Negative  Negative Final    Opiate Screen 05/23/2023 Negative  Negative Final    Amphetamine Screen, Urine 05/23/2023 Negative  Negative Final    Benzodiazepine Screen, Urine 05/23/2023 Negative  Negative Final    Tricyclic Antidepressants Screen 05/23/2023 Negative  Negative Final    Methadone Screen, Urine 05/23/2023 Negative  Negative Final    Barbiturates Screen, Urine 05/23/2023 Negative  Negative Final    Oxycodone Screen, Urine 05/23/2023 Negative  Negative Final    Propoxyphene Screen 05/23/2023 Negative  Negative Final    Buprenorphine, Screen, Urine 05/23/2023 Negative  Negative Final    PH 05/23/2023 8.1  4.5 - 9 Final    CREATININE 05/23/2023 42.9  20 - 300 mg/dL mg/dL Final    SPECIFIC GRAVITY 05/23/2023 1.006  1.003 - 1.035 Final    CODEINE 05/23/2023 Negative  50 ng/ml ng/ml Final    HYDROCODONE 05/23/2023 Negative  50 ng/ml ng/ml Final    NORHYDROCODONE 05/23/2023 Negative  50 ng/ml ng/ml Final    HYDROMORPHONE 05/23/2023 Negative  50 ng/ml ng/ml Final    MORPHINE 05/23/2023 Negative  50 ng/ml ng/ml  Final    FENTANYL 05/23/2023 Negative  2 ng/ml ng/ml Final    NORFENTANYL 05/23/2023 Negative  10 ng/ml ng/ml Final    METHADONE 05/23/2023 Negative  25 ng/ml ng/ml Final    EDDP 05/23/2023 Negative  50 ng/ml ng/ml Final    OXYCODONE 05/23/2023 Negative  50 ng/ml ng/ml Final    NOROXYCODONE 05/23/2023 Negative  50 ng/ml ng/ml Final    OXYMORPHONE 05/23/2023 Negative  50 ng/ml ng/ml Final    TAPENTADOL 05/23/2023 Negative  50 ng/ml ng/ml Final    TRAMADOL 05/23/2023 Negative  50 ng/ml ng/ml Final    BUPRENORPHINE 05/23/2023 Negative  7.5 ng/ml ng/ml Final    NORBUPRENORPHINE 05/23/2023 Negative  10 ng/ml ng/ml Final    AMOBARBITAL 05/23/2023 Negative  100 ng/ml ng/ml Final    BUTABARBITAL 05/23/2023 Negative  100 ng/ml ng/ml Final    BUTALBITAL 05/23/2023 Negative  100 ng/ml ng/ml Final    PHENOBARBITAL 05/23/2023 Negative  100 ng/ml ng/ml Final    SECOBARBITAL 05/23/2023 Negative  100 ng/ml ng/ml Final    ALPRAZOLAM 05/23/2023 Negative  50 ng/ml ng/ml Final    ALPHA-HYDROXYALPRAZOLAM 05/23/2023 Negative  50 ng/ml ng/ml Final    CLONAZEPAM 05/23/2023 Negative  50 ng/ml ng/ml Final    7- AMINOCLONAZEPAM 05/23/2023 Negative  50 ng/ml ng/ml Final    DIAZEPAM 05/23/2023 Negative  50 ng/ml ng/ml Final    NORDIAZEPAM 05/23/2023 Negative  50 ng/ml ng/ml Final    LORAZEPAM 05/23/2023 Negative  100 ng/ml ng/ml Final    MIDAZOLAM 05/23/2023 Negative  50 ng/ml ng/ml Final    ALPHA-HYDROXYMIDAZOLAM 05/23/2023 Negative  50 ng/ml ng/ml Final    OXAZEPAM 05/23/2023 Negative  50 ng/ml ng/ml Final    TEMAZEPAM 05/23/2023 Negative  50 ng/ml ng/ml Final    ETG 05/23/2023 Negative  200 ng/ml ng/ml Final    BENZOYLECGONINE 05/23/2023 Negative  100 ng/ml ng/ml Final    6-JEREMIAS 05/23/2023 Negative  10 ng/ml ng/ml Final    MDMA 05/23/2023 Negative  100 ng/ml ng/ml Final    PCP 05/23/2023 Negative  20 ng/ml ng/ml Final    DELTA-9-THC 05/23/2023 Negative  20 ng/ml ng/ml Final    AMPHETAMINE 05/23/2023 Negative  250 ng/ml ng/ml Final     METHAMPHETAMINE 05/23/2023 Negative  100 ng/ml ng/ml Final    METHYLPHENIDATE 05/23/2023 Negative  10 ng/ml ng/ml Final    PHENTERMINE 05/23/2023 Negative  100 ng/ml ng/ml Final    RITALINIC ACID 05/23/2023 Negative  50 ng/ml ng/ml Final    CARISOPRODOL 05/23/2023 Negative  100 ng/ml ng/ml Final    GABAPENTIN 05/23/2023 Negative  500 ng/ml ng/ml Final    PREGABALIN 05/23/2023 Negative  250 ng/ml ng/ml Final    ZOLPIDEM 05/23/2023 Negative  2 ng/ml ng/ml Final    CARBOXYZOLPIDEM 05/23/2023 Negative  10 ng/ml ng/ml Final   Lab on 05/16/2023   Component Date Value Ref Range Status    THC, Screen, Urine 05/16/2023 Negative  Negative Final    Phencyclidine (PCP), Urine 05/16/2023 Negative  Negative Final    Cocaine Screen, Urine 05/16/2023 Negative  Negative Final    Methamphetamine, Ur 05/16/2023 Negative  Negative Final    Opiate Screen 05/16/2023 Negative  Negative Final    Amphetamine Screen, Urine 05/16/2023 Negative  Negative Final    Benzodiazepine Screen, Urine 05/16/2023 Negative  Negative Final    Tricyclic Antidepressants Screen 05/16/2023 Negative  Negative Final    Methadone Screen, Urine 05/16/2023 Negative  Negative Final    Barbiturates Screen, Urine 05/16/2023 Negative  Negative Final    Oxycodone Screen, Urine 05/16/2023 Negative  Negative Final    Propoxyphene Screen 05/16/2023 Negative  Negative Final    Buprenorphine, Screen, Urine 05/16/2023 Negative  Negative Final    PH 05/16/2023 8.7  4.5 - 9 Final    CREATININE 05/16/2023 93.5  20 - 300 mg/dL mg/dL Final    SPECIFIC GRAVITY 05/16/2023 1.014  1.003 - 1.035 Final    CODEINE 05/16/2023 Negative  50 ng/ml ng/ml Final    HYDROCODONE 05/16/2023 Negative  50 ng/ml ng/ml Final    NORHYDROCODONE 05/16/2023 Negative  50 ng/ml ng/ml Final    HYDROMORPHONE 05/16/2023 Negative  50 ng/ml ng/ml Final    MORPHINE 05/16/2023 Negative  50 ng/ml ng/ml Final    FENTANYL 05/16/2023 Negative  2 ng/ml ng/ml Final    NORFENTANYL 05/16/2023 Negative  10 ng/ml ng/ml  Final    METHADONE 05/16/2023 Negative  25 ng/ml ng/ml Final    EDDP 05/16/2023 Negative  50 ng/ml ng/ml Final    OXYCODONE 05/16/2023 Negative  50 ng/ml ng/ml Final    NOROXYCODONE 05/16/2023 Negative  50 ng/ml ng/ml Final    OXYMORPHONE 05/16/2023 Negative  50 ng/ml ng/ml Final    TAPENTADOL 05/16/2023 Negative  50 ng/ml ng/ml Final    TRAMADOL 05/16/2023 Negative  50 ng/ml ng/ml Final    BUPRENORPHINE 05/16/2023 Negative  7.5 ng/ml ng/ml Final    NORBUPRENORPHINE 05/16/2023 Negative  10 ng/ml ng/ml Final    AMOBARBITAL 05/16/2023 Negative  100 ng/ml ng/ml Final    BUTABARBITAL 05/16/2023 Negative  100 ng/ml ng/ml Final    BUTALBITAL 05/16/2023 Negative  100 ng/ml ng/ml Final    PHENOBARBITAL 05/16/2023 Negative  100 ng/ml ng/ml Final    SECOBARBITAL 05/16/2023 Negative  100 ng/ml ng/ml Final    ALPRAZOLAM 05/16/2023 Negative  50 ng/ml ng/ml Final    ALPHA-HYDROXYALPRAZOLAM 05/16/2023 Negative  50 ng/ml ng/ml Final    CLONAZEPAM 05/16/2023 Negative  50 ng/ml ng/ml Final    7- AMINOCLONAZEPAM 05/16/2023 Negative  50 ng/ml ng/ml Final    DIAZEPAM 05/16/2023 Negative  50 ng/ml ng/ml Final    NORDIAZEPAM 05/16/2023 Negative  50 ng/ml ng/ml Final    LORAZEPAM 05/16/2023 Negative  100 ng/ml ng/ml Final    MIDAZOLAM 05/16/2023 Negative  50 ng/ml ng/ml Final    ALPHA-HYDROXYMIDAZOLAM 05/16/2023 Negative  50 ng/ml ng/ml Final    OXAZEPAM 05/16/2023 Negative  50 ng/ml ng/ml Final    TEMAZEPAM 05/16/2023 Negative  50 ng/ml ng/ml Final    ETG 05/16/2023 Negative  200 ng/ml ng/ml Final    BENZOYLECGONINE 05/16/2023 Negative  100 ng/ml ng/ml Final    6-JEREMIAS 05/16/2023 Negative  10 ng/ml ng/ml Final    MDMA 05/16/2023 Negative  100 ng/ml ng/ml Final    PCP 05/16/2023 Negative  20 ng/ml ng/ml Final    DELTA-9-THC 05/16/2023 Negative  20 ng/ml ng/ml Final    AMPHETAMINE 05/16/2023 Negative  250 ng/ml ng/ml Final    METHAMPHETAMINE 05/16/2023 Negative  100 ng/ml ng/ml Final    METHYLPHENIDATE 05/16/2023 Negative  10 ng/ml ng/ml  Final    PHENTERMINE 05/16/2023 Negative  100 ng/ml ng/ml Final    RITALINIC ACID 05/16/2023 Negative  50 ng/ml ng/ml Final    CARISOPRODOL 05/16/2023 Negative  100 ng/ml ng/ml Final    GABAPENTIN 05/16/2023 Negative  500 ng/ml ng/ml Final    PREGABALIN 05/16/2023 Negative  250 ng/ml ng/ml Final    ZOLPIDEM 05/16/2023 Negative  2 ng/ml ng/ml Final    CARBOXYZOLPIDEM 05/16/2023 Negative  10 ng/ml ng/ml Final       Mental Status Exam  Hygiene:  good  Dress: casual  Attitude: cooperative and agreeable   Motor Activity: appropriate  Eye Contact:  good  Speech: regular rate and rhythm   Mood:  calm and cooperative  Affect:  Appropriate  Thought Processes:  Linear  Thought Content:  Normal  Suicidal Thoughts:  denies  Homicidal Thoughts:  denies  Crisis Safety Plan: Safety plan has been discussed.   Hallucinations:  Unknown to clinician.   Reliability: fair  Insight: fair  Judgement: fair  Impulse Control: fair    Recovery/spiritual support group attendance: No.      Progress toward goal: Not at goal    Prognosis: Fair with Ongoing Treatment     Self-reported number of days sober: Patient reported 77 on check in form.     Patient will contact this office, call 911 or present to the nearest emergency room should suicidal or homicidal ideations occur.    Impression/Formulation:    ICD-10-CM ICD-9-CM   1. Opioid use disorder, severe, dependence  F11.20 304.00       Clinical Maneuvering/Interventions: Therapist utilized a person-centered approach to build rapport with group member. Therapist implemented motivational interviewing techniques to assist client with exploring and resolving ambivalence associated with commitment to change behaviors related to substance use and addiction. Therapist applied cognitive behavioral strategies to facilitate identification of maladaptive patterns of thinking and behavior that contribute to client's risk for continued substance use and relapse. Therapist employed group interaction  activities to build rapport among group members, promote sobriety, and emphasize relapse prevention. Therapist promoted safe nonjudgmental environment by providing group members with unconditional positive regard and encouraging group members to comply with group rules and guidelines. Therapist assisted group member with identifying and implementing healthier coping strategies.      Plan:  Continue [facility] IOP Phase I   Aftercare:  [facility] Phase II  Program Assignments:  Personal recovery plan, relapse prevention plan, attendance of recovery support group meetings, exploration of sponsorship, drug/alcohol screens.     Ta Ambrose LCSW  6/5/2023  13:47 EDT

## 2023-06-07 ENCOUNTER — OFFICE VISIT (OUTPATIENT)
Dept: PSYCHIATRY | Facility: HOSPITAL | Age: 38
End: 2023-06-07
Payer: MEDICAID

## 2023-06-07 DIAGNOSIS — F11.20 OPIOID USE DISORDER, SEVERE, DEPENDENCE: Primary | ICD-10-CM

## 2023-06-07 PROCEDURE — H0015 ALCOHOL AND/OR DRUG SERVICES: HCPCS | Performed by: NURSE PRACTITIONER

## 2023-06-07 NOTE — PROGRESS NOTES
"[facility] IOP GROUP     Date: 06/05/2023  Name: Taye Ahmadi    Time In: 1800   Time Out: 2054.     Number of participants: 11.    IOP GROUP NOTE     Data: 3 hour IOP group therapy session (Check-ins, Coping Skills, Relapse Prevention)     Check Ins: Therapist continued facilitation of rapport building strategies between group members. Therapist asked that each patient check in with home life and recovery efforts and identify triggers, cravings, and high risk situations that arise between group sessions. Therapist provided empathy and support during group session.     Session Content/Coping Skills: Check ins completed by group members. Clinician reviewed initial individual treatment plans with group and group members signed treatment plans. Living in Balance, Session 5, Part One: Alcohol and Alcoholism reviewed and discussed. “Why did you quit” and letter to future self psychoeducational material completed (Retrieved from OLMAN Riggs (2021). The outside the box recovery workbook, pp. 11-13). Clinician closed group by asking each group member to share their strategies for not using between then and next group.      Response: Patient attended class in person. Patient participated in completion of check in form. Patient on check in form answered no to question \"currently or since last group meeting have you had any suicidal thoughts or plan or intent to hurt yourself”, and patient also answered no on check in form to question of \"currently or since your last group meeting, have you had any homicidal thoughts or plan or intent to hurt others\".     Personal Assessment 0-10 Scale (0-none, 10-high)    Anxiety:  2   Depression:  1   Cravings: 1     Assessment:     ..  Admission on 05/31/2023, Discharged on 05/31/2023   Component Date Value Ref Range Status    SARS Antigen 05/31/2023 Not Detected  Not Detected, Presumptive Negative Final    Influenza A Antigen DULCE 05/31/2023 Not Detected  Not Detected Final    Influenza B " Antigen DULCE 05/31/2023 Not Detected  Not Detected Final    Internal Control 05/31/2023 Passed  Passed Final    Lot Number 05/31/2023 [id number]   Final    Expiration Date 05/31/2023 03/23/2024   Final    Rapid Strep A Screen 05/31/2023 Negative   Final    Internal Control 05/31/2023 Passed   Final    Lot Number 05/31/2023 [id number]   Final    Expiration Date 05/31/2023 09/30/2024   Final   Lab on 05/30/2023   Component Date Value Ref Range Status    THC, Screen, Urine 05/30/2023 Negative  Negative Final    Phencyclidine (PCP), Urine 05/30/2023 Negative  Negative Final    Cocaine Screen, Urine 05/30/2023 Negative  Negative Final    Methamphetamine, Ur 05/30/2023 Negative  Negative Final    Opiate Screen 05/30/2023 Negative  Negative Final    Amphetamine Screen, Urine 05/30/2023 Negative  Negative Final    Benzodiazepine Screen, Urine 05/30/2023 Negative  Negative Final    Tricyclic Antidepressants Screen 05/30/2023 Negative  Negative Final    Methadone Screen, Urine 05/30/2023 Negative  Negative Final    Barbiturates Screen, Urine 05/30/2023 Negative  Negative Final    Oxycodone Screen, Urine 05/30/2023 Negative  Negative Final    Propoxyphene Screen 05/30/2023 Negative  Negative Final    Buprenorphine, Screen, Urine 05/30/2023 Negative  Negative Final    PH 05/30/2023 8.4  4.5 - 9 Final    CREATININE 05/30/2023 62.7  20 - 300 mg/dL mg/dL Final    SPECIFIC GRAVITY 05/30/2023 1.010  1.003 - 1.035 Final    CODEINE 05/30/2023 Negative  50 ng/ml ng/ml Final    HYDROCODONE 05/30/2023 Negative  50 ng/ml ng/ml Final    NORHYDROCODONE 05/30/2023 Negative  50 ng/ml ng/ml Final    HYDROMORPHONE 05/30/2023 Negative  50 ng/ml ng/ml Final    MORPHINE 05/30/2023 Negative  50 ng/ml ng/ml Final    FENTANYL 05/30/2023 Negative  2 ng/ml ng/ml Final    NORFENTANYL 05/30/2023 Negative  10 ng/ml ng/ml Final    METHADONE 05/30/2023 Negative  25 ng/ml ng/ml Final    EDDP 05/30/2023 Negative  50 ng/ml ng/ml Final    OXYCODONE 05/30/2023  Negative  50 ng/ml ng/ml Final    NOROXYCODONE 05/30/2023 Negative  50 ng/ml ng/ml Final    OXYMORPHONE 05/30/2023 Negative  50 ng/ml ng/ml Final    TAPENTADOL 05/30/2023 Negative  50 ng/ml ng/ml Final    TRAMADOL 05/30/2023 Negative  50 ng/ml ng/ml Final    BUPRENORPHINE 05/30/2023 Negative  7.5 ng/ml ng/ml Final    NORBUPRENORPHINE 05/30/2023 Negative  10 ng/ml ng/ml Final    AMOBARBITAL 05/30/2023 Negative  100 ng/ml ng/ml Final    BUTABARBITAL 05/30/2023 Negative  100 ng/ml ng/ml Final    BUTALBITAL 05/30/2023 Negative  100 ng/ml ng/ml Final    PHENOBARBITAL 05/30/2023 Negative  100 ng/ml ng/ml Final    SECOBARBITAL 05/30/2023 Negative  100 ng/ml ng/ml Final    ALPRAZOLAM 05/30/2023 Negative  50 ng/ml ng/ml Final    ALPHA-HYDROXYALPRAZOLAM 05/30/2023 Negative  50 ng/ml ng/ml Final    CLONAZEPAM 05/30/2023 Negative  50 ng/ml ng/ml Final    7- AMINOCLONAZEPAM 05/30/2023 Negative  50 ng/ml ng/ml Final    DIAZEPAM 05/30/2023 Negative  50 ng/ml ng/ml Final    NORDIAZEPAM 05/30/2023 Negative  50 ng/ml ng/ml Final    LORAZEPAM 05/30/2023 Negative  100 ng/ml ng/ml Final    MIDAZOLAM 05/30/2023 Negative  50 ng/ml ng/ml Final    ALPHA-HYDROXYMIDAZOLAM 05/30/2023 Negative  50 ng/ml ng/ml Final    OXAZEPAM 05/30/2023 Negative  50 ng/ml ng/ml Final    TEMAZEPAM 05/30/2023 Negative  50 ng/ml ng/ml Final    ETG 05/30/2023 Negative  200 ng/ml ng/ml Final    BENZOYLECGONINE 05/30/2023 Negative  100 ng/ml ng/ml Final    6-JEREMIAS 05/30/2023 Negative  10 ng/ml ng/ml Final    MDMA 05/30/2023 Negative  100 ng/ml ng/ml Final    PCP 05/30/2023 Negative  20 ng/ml ng/ml Final    DELTA-9-THC 05/30/2023 Negative  20 ng/ml ng/ml Final    AMPHETAMINE 05/30/2023 Negative  250 ng/ml ng/ml Final    METHAMPHETAMINE 05/30/2023 Negative  100 ng/ml ng/ml Final    METHYLPHENIDATE 05/30/2023 Negative  10 ng/ml ng/ml Final    PHENTERMINE 05/30/2023 Negative  100 ng/ml ng/ml Final    RITALINIC ACID 05/30/2023 Negative  50 ng/ml ng/ml Final    CARISOPRODOL  05/30/2023 Negative  100 ng/ml ng/ml Final    GABAPENTIN 05/30/2023 Negative  500 ng/ml ng/ml Final    PREGABALIN 05/30/2023 Negative  250 ng/ml ng/ml Final    ZOLPIDEM 05/30/2023 Negative  2 ng/ml ng/ml Final    CARBOXYZOLPIDEM 05/30/2023 Negative  10 ng/ml ng/ml Final   Lab on 05/23/2023   Component Date Value Ref Range Status    THC, Screen, Urine 05/23/2023 Negative  Negative Final    Phencyclidine (PCP), Urine 05/23/2023 Negative  Negative Final    Cocaine Screen, Urine 05/23/2023 Negative  Negative Final    Methamphetamine, Ur 05/23/2023 Negative  Negative Final    Opiate Screen 05/23/2023 Negative  Negative Final    Amphetamine Screen, Urine 05/23/2023 Negative  Negative Final    Benzodiazepine Screen, Urine 05/23/2023 Negative  Negative Final    Tricyclic Antidepressants Screen 05/23/2023 Negative  Negative Final    Methadone Screen, Urine 05/23/2023 Negative  Negative Final    Barbiturates Screen, Urine 05/23/2023 Negative  Negative Final    Oxycodone Screen, Urine 05/23/2023 Negative  Negative Final    Propoxyphene Screen 05/23/2023 Negative  Negative Final    Buprenorphine, Screen, Urine 05/23/2023 Negative  Negative Final    PH 05/23/2023 8.1  4.5 - 9 Final    CREATININE 05/23/2023 42.9  20 - 300 mg/dL mg/dL Final    SPECIFIC GRAVITY 05/23/2023 1.006  1.003 - 1.035 Final    CODEINE 05/23/2023 Negative  50 ng/ml ng/ml Final    HYDROCODONE 05/23/2023 Negative  50 ng/ml ng/ml Final    NORHYDROCODONE 05/23/2023 Negative  50 ng/ml ng/ml Final    HYDROMORPHONE 05/23/2023 Negative  50 ng/ml ng/ml Final    MORPHINE 05/23/2023 Negative  50 ng/ml ng/ml Final    FENTANYL 05/23/2023 Negative  2 ng/ml ng/ml Final    NORFENTANYL 05/23/2023 Negative  10 ng/ml ng/ml Final    METHADONE 05/23/2023 Negative  25 ng/ml ng/ml Final    EDDP 05/23/2023 Negative  50 ng/ml ng/ml Final    OXYCODONE 05/23/2023 Negative  50 ng/ml ng/ml Final    NOROXYCODONE 05/23/2023 Negative  50 ng/ml ng/ml Final    OXYMORPHONE 05/23/2023 Negative   50 ng/ml ng/ml Final    TAPENTADOL 05/23/2023 Negative  50 ng/ml ng/ml Final    TRAMADOL 05/23/2023 Negative  50 ng/ml ng/ml Final    BUPRENORPHINE 05/23/2023 Negative  7.5 ng/ml ng/ml Final    NORBUPRENORPHINE 05/23/2023 Negative  10 ng/ml ng/ml Final    AMOBARBITAL 05/23/2023 Negative  100 ng/ml ng/ml Final    BUTABARBITAL 05/23/2023 Negative  100 ng/ml ng/ml Final    BUTALBITAL 05/23/2023 Negative  100 ng/ml ng/ml Final    PHENOBARBITAL 05/23/2023 Negative  100 ng/ml ng/ml Final    SECOBARBITAL 05/23/2023 Negative  100 ng/ml ng/ml Final    ALPRAZOLAM 05/23/2023 Negative  50 ng/ml ng/ml Final    ALPHA-HYDROXYALPRAZOLAM 05/23/2023 Negative  50 ng/ml ng/ml Final    CLONAZEPAM 05/23/2023 Negative  50 ng/ml ng/ml Final    7- AMINOCLONAZEPAM 05/23/2023 Negative  50 ng/ml ng/ml Final    DIAZEPAM 05/23/2023 Negative  50 ng/ml ng/ml Final    NORDIAZEPAM 05/23/2023 Negative  50 ng/ml ng/ml Final    LORAZEPAM 05/23/2023 Negative  100 ng/ml ng/ml Final    MIDAZOLAM 05/23/2023 Negative  50 ng/ml ng/ml Final    ALPHA-HYDROXYMIDAZOLAM 05/23/2023 Negative  50 ng/ml ng/ml Final    OXAZEPAM 05/23/2023 Negative  50 ng/ml ng/ml Final    TEMAZEPAM 05/23/2023 Negative  50 ng/ml ng/ml Final    ETG 05/23/2023 Negative  200 ng/ml ng/ml Final    BENZOYLECGONINE 05/23/2023 Negative  100 ng/ml ng/ml Final    6-JEREMIAS 05/23/2023 Negative  10 ng/ml ng/ml Final    MDMA 05/23/2023 Negative  100 ng/ml ng/ml Final    PCP 05/23/2023 Negative  20 ng/ml ng/ml Final    DELTA-9-THC 05/23/2023 Negative  20 ng/ml ng/ml Final    AMPHETAMINE 05/23/2023 Negative  250 ng/ml ng/ml Final    METHAMPHETAMINE 05/23/2023 Negative  100 ng/ml ng/ml Final    METHYLPHENIDATE 05/23/2023 Negative  10 ng/ml ng/ml Final    PHENTERMINE 05/23/2023 Negative  100 ng/ml ng/ml Final    RITALINIC ACID 05/23/2023 Negative  50 ng/ml ng/ml Final    CARISOPRODOL 05/23/2023 Negative  100 ng/ml ng/ml Final    GABAPENTIN 05/23/2023 Negative  500 ng/ml ng/ml Final    PREGABALIN  05/23/2023 Negative  250 ng/ml ng/ml Final    ZOLPIDEM 05/23/2023 Negative  2 ng/ml ng/ml Final    CARBOXYZOLPIDEM 05/23/2023 Negative  10 ng/ml ng/ml Final   Lab on 05/16/2023   Component Date Value Ref Range Status    THC, Screen, Urine 05/16/2023 Negative  Negative Final    Phencyclidine (PCP), Urine 05/16/2023 Negative  Negative Final    Cocaine Screen, Urine 05/16/2023 Negative  Negative Final    Methamphetamine, Ur 05/16/2023 Negative  Negative Final    Opiate Screen 05/16/2023 Negative  Negative Final    Amphetamine Screen, Urine 05/16/2023 Negative  Negative Final    Benzodiazepine Screen, Urine 05/16/2023 Negative  Negative Final    Tricyclic Antidepressants Screen 05/16/2023 Negative  Negative Final    Methadone Screen, Urine 05/16/2023 Negative  Negative Final    Barbiturates Screen, Urine 05/16/2023 Negative  Negative Final    Oxycodone Screen, Urine 05/16/2023 Negative  Negative Final    Propoxyphene Screen 05/16/2023 Negative  Negative Final    Buprenorphine, Screen, Urine 05/16/2023 Negative  Negative Final    PH 05/16/2023 8.7  4.5 - 9 Final    CREATININE 05/16/2023 93.5  20 - 300 mg/dL mg/dL Final    SPECIFIC GRAVITY 05/16/2023 1.014  1.003 - 1.035 Final    CODEINE 05/16/2023 Negative  50 ng/ml ng/ml Final    HYDROCODONE 05/16/2023 Negative  50 ng/ml ng/ml Final    NORHYDROCODONE 05/16/2023 Negative  50 ng/ml ng/ml Final    HYDROMORPHONE 05/16/2023 Negative  50 ng/ml ng/ml Final    MORPHINE 05/16/2023 Negative  50 ng/ml ng/ml Final    FENTANYL 05/16/2023 Negative  2 ng/ml ng/ml Final    NORFENTANYL 05/16/2023 Negative  10 ng/ml ng/ml Final    METHADONE 05/16/2023 Negative  25 ng/ml ng/ml Final    EDDP 05/16/2023 Negative  50 ng/ml ng/ml Final    OXYCODONE 05/16/2023 Negative  50 ng/ml ng/ml Final    NOROXYCODONE 05/16/2023 Negative  50 ng/ml ng/ml Final    OXYMORPHONE 05/16/2023 Negative  50 ng/ml ng/ml Final    TAPENTADOL 05/16/2023 Negative  50 ng/ml ng/ml Final    TRAMADOL 05/16/2023 Negative  50  ng/ml ng/ml Final    BUPRENORPHINE 05/16/2023 Negative  7.5 ng/ml ng/ml Final    NORBUPRENORPHINE 05/16/2023 Negative  10 ng/ml ng/ml Final    AMOBARBITAL 05/16/2023 Negative  100 ng/ml ng/ml Final    BUTABARBITAL 05/16/2023 Negative  100 ng/ml ng/ml Final    BUTALBITAL 05/16/2023 Negative  100 ng/ml ng/ml Final    PHENOBARBITAL 05/16/2023 Negative  100 ng/ml ng/ml Final    SECOBARBITAL 05/16/2023 Negative  100 ng/ml ng/ml Final    ALPRAZOLAM 05/16/2023 Negative  50 ng/ml ng/ml Final    ALPHA-HYDROXYALPRAZOLAM 05/16/2023 Negative  50 ng/ml ng/ml Final    CLONAZEPAM 05/16/2023 Negative  50 ng/ml ng/ml Final    7- AMINOCLONAZEPAM 05/16/2023 Negative  50 ng/ml ng/ml Final    DIAZEPAM 05/16/2023 Negative  50 ng/ml ng/ml Final    NORDIAZEPAM 05/16/2023 Negative  50 ng/ml ng/ml Final    LORAZEPAM 05/16/2023 Negative  100 ng/ml ng/ml Final    MIDAZOLAM 05/16/2023 Negative  50 ng/ml ng/ml Final    ALPHA-HYDROXYMIDAZOLAM 05/16/2023 Negative  50 ng/ml ng/ml Final    OXAZEPAM 05/16/2023 Negative  50 ng/ml ng/ml Final    TEMAZEPAM 05/16/2023 Negative  50 ng/ml ng/ml Final    ETG 05/16/2023 Negative  200 ng/ml ng/ml Final    BENZOYLECGONINE 05/16/2023 Negative  100 ng/ml ng/ml Final    6-JEREMIAS 05/16/2023 Negative  10 ng/ml ng/ml Final    MDMA 05/16/2023 Negative  100 ng/ml ng/ml Final    PCP 05/16/2023 Negative  20 ng/ml ng/ml Final    DELTA-9-THC 05/16/2023 Negative  20 ng/ml ng/ml Final    AMPHETAMINE 05/16/2023 Negative  250 ng/ml ng/ml Final    METHAMPHETAMINE 05/16/2023 Negative  100 ng/ml ng/ml Final    METHYLPHENIDATE 05/16/2023 Negative  10 ng/ml ng/ml Final    PHENTERMINE 05/16/2023 Negative  100 ng/ml ng/ml Final    RITALINIC ACID 05/16/2023 Negative  50 ng/ml ng/ml Final    CARISOPRODOL 05/16/2023 Negative  100 ng/ml ng/ml Final    GABAPENTIN 05/16/2023 Negative  500 ng/ml ng/ml Final    PREGABALIN 05/16/2023 Negative  250 ng/ml ng/ml Final    ZOLPIDEM 05/16/2023 Negative  2 ng/ml ng/ml Final    CARBOXYZOLPIDEM  05/16/2023 Negative  10 ng/ml ng/ml Final       Mental Status Exam  Hygiene:  good  Dress: casual  Attitude: cooperative and agreeable   Motor Activity: appropriate  Eye Contact:  good  Speech: regular rate and rhythm   Mood:  calm and cooperative  Affect:  Appropriate  Thought Processes:  Linear  Thought Content:  Normal  Suicidal Thoughts:  denies  Homicidal Thoughts:  denies  Crisis Safety Plan: Safety plan has been discussed.   Hallucinations:  Unknown to clinician.   Reliability: fair  Insight: fair  Judgement: fair  Impulse Control: fair    Recovery/spiritual support group attendance: No.      Progress toward goal: Not at goal    Prognosis: Fair with Ongoing Treatment     Self-reported number of days sober: Patient reported 80 on check in form.     Patient will contact this office, call 911 or present to the nearest emergency room should suicidal or homicidal ideations occur.    Impression/Formulation:    ICD-10-CM ICD-9-CM   1. Opioid use disorder, severe, dependence  F11.20 304.00       Clinical Maneuvering/Interventions: Therapist utilized a person-centered approach to build rapport with group member. Therapist implemented motivational interviewing techniques to assist client with exploring and resolving ambivalence associated with commitment to change behaviors related to substance use and addiction. Therapist applied cognitive behavioral strategies to facilitate identification of maladaptive patterns of thinking and behavior that contribute to client's risk for continued substance use and relapse. Therapist employed group interaction activities to build rapport among group members, promote sobriety, and emphasize relapse prevention. Therapist promoted safe nonjudgmental environment by providing group members with unconditional positive regard and encouraging group members to comply with group rules and guidelines. Therapist assisted group member with identifying and implementing healthier coping  strategies.      Plan:  Continue [facility] IOP Phase I   Aftercare:  [facility] Phase II  Program Assignments:  Personal recovery plan, relapse prevention plan, attendance of recovery support group meetings, exploration of sponsorship, drug/alcohol screens.     Ta Ambrose LCSW  6/7/2023  13:39 EDT

## 2023-06-08 ENCOUNTER — OFFICE VISIT (OUTPATIENT)
Dept: PSYCHIATRY | Facility: HOSPITAL | Age: 38
End: 2023-06-08
Payer: MEDICAID

## 2023-06-08 ENCOUNTER — LAB (OUTPATIENT)
Dept: LAB | Facility: HOSPITAL | Age: 38
End: 2023-06-08
Payer: MEDICAID

## 2023-06-08 DIAGNOSIS — F11.20 OPIOID USE DISORDER, SEVERE, DEPENDENCE: Primary | ICD-10-CM

## 2023-06-08 DIAGNOSIS — F11.20 OPIOID USE DISORDER, SEVERE, DEPENDENCE: ICD-10-CM

## 2023-06-08 PROCEDURE — 80306 DRUG TEST PRSMV INSTRMNT: CPT

## 2023-06-08 PROCEDURE — H0015 ALCOHOL AND/OR DRUG SERVICES: HCPCS | Performed by: NURSE PRACTITIONER

## 2023-06-08 NOTE — PROGRESS NOTES
"[facility] IOP GROUP     Date: 06/07/2023  Name: Taye Ahmadi    Time In: 1800   Time Out: 2035.     Number of participants: 9.    IOP GROUP NOTE     Data: 3 hour IOP group therapy session (Check-ins, Coping Skills, Relapse Prevention)     Check Ins: Therapist continued facilitation of rapport building strategies between group members. Therapist asked that each patient check in with home life and recovery efforts and identify triggers, cravings, and high risk situations that arise between group sessions. Therapist provided empathy and support during group session.     Session Content/Coping Skills: Introductions completed. Group members had organized a potHRBossk dinner to help build group comradery and some group members had brought in meals for the dinner. Check ins completed by group members. Article “Ten Reasons Addicts Struggle In Early Recovery” reviewed and discussed (Compliance 11). Time was allowed at the end of the group for group members to clean and take food from the potluck dinner.      Response: Patient attended class in person. Patient participated in completion of check in form. Patient on check in form answered no to question \"currently or since last group meeting have you had any suicidal thoughts or plan or intent to hurt yourself”, and patient also answered no on check in form to question of \"currently or since your last group meeting, have you had any homicidal thoughts or plan or intent to hurt others\".     Personal Assessment 0-10 Scale (0-none, 10-high)    Anxiety:  1   Depression:  1   Cravings: 1     Assessment:     ..  Admission on 05/31/2023, Discharged on 05/31/2023   Component Date Value Ref Range Status    SARS Antigen 05/31/2023 Not Detected  Not Detected, Presumptive Negative Final    Influenza A Antigen DULCE 05/31/2023 Not Detected  Not Detected Final    Influenza B Antigen DULCE 05/31/2023 Not Detected  Not Detected Final    Internal Control 05/31/2023 Passed  Passed Final    Lot Number " 05/31/2023 [id number]   Final    Expiration Date 05/31/2023 03/23/2024   Final    Rapid Strep A Screen 05/31/2023 Negative   Final    Internal Control 05/31/2023 Passed   Final    Lot Number 05/31/2023 [id number]   Final    Expiration Date 05/31/2023 09/30/2024   Final   Lab on 05/30/2023   Component Date Value Ref Range Status    THC, Screen, Urine 05/30/2023 Negative  Negative Final    Phencyclidine (PCP), Urine 05/30/2023 Negative  Negative Final    Cocaine Screen, Urine 05/30/2023 Negative  Negative Final    Methamphetamine, Ur 05/30/2023 Negative  Negative Final    Opiate Screen 05/30/2023 Negative  Negative Final    Amphetamine Screen, Urine 05/30/2023 Negative  Negative Final    Benzodiazepine Screen, Urine 05/30/2023 Negative  Negative Final    Tricyclic Antidepressants Screen 05/30/2023 Negative  Negative Final    Methadone Screen, Urine 05/30/2023 Negative  Negative Final    Barbiturates Screen, Urine 05/30/2023 Negative  Negative Final    Oxycodone Screen, Urine 05/30/2023 Negative  Negative Final    Propoxyphene Screen 05/30/2023 Negative  Negative Final    Buprenorphine, Screen, Urine 05/30/2023 Negative  Negative Final    PH 05/30/2023 8.4  4.5 - 9 Final    CREATININE 05/30/2023 62.7  20 - 300 mg/dL mg/dL Final    SPECIFIC GRAVITY 05/30/2023 1.010  1.003 - 1.035 Final    CODEINE 05/30/2023 Negative  50 ng/ml ng/ml Final    HYDROCODONE 05/30/2023 Negative  50 ng/ml ng/ml Final    NORHYDROCODONE 05/30/2023 Negative  50 ng/ml ng/ml Final    HYDROMORPHONE 05/30/2023 Negative  50 ng/ml ng/ml Final    MORPHINE 05/30/2023 Negative  50 ng/ml ng/ml Final    FENTANYL 05/30/2023 Negative  2 ng/ml ng/ml Final    NORFENTANYL 05/30/2023 Negative  10 ng/ml ng/ml Final    METHADONE 05/30/2023 Negative  25 ng/ml ng/ml Final    EDDP 05/30/2023 Negative  50 ng/ml ng/ml Final    OXYCODONE 05/30/2023 Negative  50 ng/ml ng/ml Final    NOROXYCODONE 05/30/2023 Negative  50 ng/ml ng/ml Final    OXYMORPHONE 05/30/2023 Negative  50  ng/ml ng/ml Final    TAPENTADOL 05/30/2023 Negative  50 ng/ml ng/ml Final    TRAMADOL 05/30/2023 Negative  50 ng/ml ng/ml Final    BUPRENORPHINE 05/30/2023 Negative  7.5 ng/ml ng/ml Final    NORBUPRENORPHINE 05/30/2023 Negative  10 ng/ml ng/ml Final    AMOBARBITAL 05/30/2023 Negative  100 ng/ml ng/ml Final    BUTABARBITAL 05/30/2023 Negative  100 ng/ml ng/ml Final    BUTALBITAL 05/30/2023 Negative  100 ng/ml ng/ml Final    PHENOBARBITAL 05/30/2023 Negative  100 ng/ml ng/ml Final    SECOBARBITAL 05/30/2023 Negative  100 ng/ml ng/ml Final    ALPRAZOLAM 05/30/2023 Negative  50 ng/ml ng/ml Final    ALPHA-HYDROXYALPRAZOLAM 05/30/2023 Negative  50 ng/ml ng/ml Final    CLONAZEPAM 05/30/2023 Negative  50 ng/ml ng/ml Final    7- AMINOCLONAZEPAM 05/30/2023 Negative  50 ng/ml ng/ml Final    DIAZEPAM 05/30/2023 Negative  50 ng/ml ng/ml Final    NORDIAZEPAM 05/30/2023 Negative  50 ng/ml ng/ml Final    LORAZEPAM 05/30/2023 Negative  100 ng/ml ng/ml Final    MIDAZOLAM 05/30/2023 Negative  50 ng/ml ng/ml Final    ALPHA-HYDROXYMIDAZOLAM 05/30/2023 Negative  50 ng/ml ng/ml Final    OXAZEPAM 05/30/2023 Negative  50 ng/ml ng/ml Final    TEMAZEPAM 05/30/2023 Negative  50 ng/ml ng/ml Final    ETG 05/30/2023 Negative  200 ng/ml ng/ml Final    BENZOYLECGONINE 05/30/2023 Negative  100 ng/ml ng/ml Final    6-JEREMIAS 05/30/2023 Negative  10 ng/ml ng/ml Final    MDMA 05/30/2023 Negative  100 ng/ml ng/ml Final    PCP 05/30/2023 Negative  20 ng/ml ng/ml Final    DELTA-9-THC 05/30/2023 Negative  20 ng/ml ng/ml Final    AMPHETAMINE 05/30/2023 Negative  250 ng/ml ng/ml Final    METHAMPHETAMINE 05/30/2023 Negative  100 ng/ml ng/ml Final    METHYLPHENIDATE 05/30/2023 Negative  10 ng/ml ng/ml Final    PHENTERMINE 05/30/2023 Negative  100 ng/ml ng/ml Final    RITALINIC ACID 05/30/2023 Negative  50 ng/ml ng/ml Final    CARISOPRODOL 05/30/2023 Negative  100 ng/ml ng/ml Final    GABAPENTIN 05/30/2023 Negative  500 ng/ml ng/ml Final    PREGABALIN 05/30/2023  Negative  250 ng/ml ng/ml Final    ZOLPIDEM 05/30/2023 Negative  2 ng/ml ng/ml Final    CARBOXYZOLPIDEM 05/30/2023 Negative  10 ng/ml ng/ml Final   Lab on 05/23/2023   Component Date Value Ref Range Status    THC, Screen, Urine 05/23/2023 Negative  Negative Final    Phencyclidine (PCP), Urine 05/23/2023 Negative  Negative Final    Cocaine Screen, Urine 05/23/2023 Negative  Negative Final    Methamphetamine, Ur 05/23/2023 Negative  Negative Final    Opiate Screen 05/23/2023 Negative  Negative Final    Amphetamine Screen, Urine 05/23/2023 Negative  Negative Final    Benzodiazepine Screen, Urine 05/23/2023 Negative  Negative Final    Tricyclic Antidepressants Screen 05/23/2023 Negative  Negative Final    Methadone Screen, Urine 05/23/2023 Negative  Negative Final    Barbiturates Screen, Urine 05/23/2023 Negative  Negative Final    Oxycodone Screen, Urine 05/23/2023 Negative  Negative Final    Propoxyphene Screen 05/23/2023 Negative  Negative Final    Buprenorphine, Screen, Urine 05/23/2023 Negative  Negative Final    PH 05/23/2023 8.1  4.5 - 9 Final    CREATININE 05/23/2023 42.9  20 - 300 mg/dL mg/dL Final    SPECIFIC GRAVITY 05/23/2023 1.006  1.003 - 1.035 Final    CODEINE 05/23/2023 Negative  50 ng/ml ng/ml Final    HYDROCODONE 05/23/2023 Negative  50 ng/ml ng/ml Final    NORHYDROCODONE 05/23/2023 Negative  50 ng/ml ng/ml Final    HYDROMORPHONE 05/23/2023 Negative  50 ng/ml ng/ml Final    MORPHINE 05/23/2023 Negative  50 ng/ml ng/ml Final    FENTANYL 05/23/2023 Negative  2 ng/ml ng/ml Final    NORFENTANYL 05/23/2023 Negative  10 ng/ml ng/ml Final    METHADONE 05/23/2023 Negative  25 ng/ml ng/ml Final    EDDP 05/23/2023 Negative  50 ng/ml ng/ml Final    OXYCODONE 05/23/2023 Negative  50 ng/ml ng/ml Final    NOROXYCODONE 05/23/2023 Negative  50 ng/ml ng/ml Final    OXYMORPHONE 05/23/2023 Negative  50 ng/ml ng/ml Final    TAPENTADOL 05/23/2023 Negative  50 ng/ml ng/ml Final    TRAMADOL 05/23/2023 Negative  50 ng/ml  ng/ml Final    BUPRENORPHINE 05/23/2023 Negative  7.5 ng/ml ng/ml Final    NORBUPRENORPHINE 05/23/2023 Negative  10 ng/ml ng/ml Final    AMOBARBITAL 05/23/2023 Negative  100 ng/ml ng/ml Final    BUTABARBITAL 05/23/2023 Negative  100 ng/ml ng/ml Final    BUTALBITAL 05/23/2023 Negative  100 ng/ml ng/ml Final    PHENOBARBITAL 05/23/2023 Negative  100 ng/ml ng/ml Final    SECOBARBITAL 05/23/2023 Negative  100 ng/ml ng/ml Final    ALPRAZOLAM 05/23/2023 Negative  50 ng/ml ng/ml Final    ALPHA-HYDROXYALPRAZOLAM 05/23/2023 Negative  50 ng/ml ng/ml Final    CLONAZEPAM 05/23/2023 Negative  50 ng/ml ng/ml Final    7- AMINOCLONAZEPAM 05/23/2023 Negative  50 ng/ml ng/ml Final    DIAZEPAM 05/23/2023 Negative  50 ng/ml ng/ml Final    NORDIAZEPAM 05/23/2023 Negative  50 ng/ml ng/ml Final    LORAZEPAM 05/23/2023 Negative  100 ng/ml ng/ml Final    MIDAZOLAM 05/23/2023 Negative  50 ng/ml ng/ml Final    ALPHA-HYDROXYMIDAZOLAM 05/23/2023 Negative  50 ng/ml ng/ml Final    OXAZEPAM 05/23/2023 Negative  50 ng/ml ng/ml Final    TEMAZEPAM 05/23/2023 Negative  50 ng/ml ng/ml Final    ETG 05/23/2023 Negative  200 ng/ml ng/ml Final    BENZOYLECGONINE 05/23/2023 Negative  100 ng/ml ng/ml Final    6-JEREMIAS 05/23/2023 Negative  10 ng/ml ng/ml Final    MDMA 05/23/2023 Negative  100 ng/ml ng/ml Final    PCP 05/23/2023 Negative  20 ng/ml ng/ml Final    DELTA-9-THC 05/23/2023 Negative  20 ng/ml ng/ml Final    AMPHETAMINE 05/23/2023 Negative  250 ng/ml ng/ml Final    METHAMPHETAMINE 05/23/2023 Negative  100 ng/ml ng/ml Final    METHYLPHENIDATE 05/23/2023 Negative  10 ng/ml ng/ml Final    PHENTERMINE 05/23/2023 Negative  100 ng/ml ng/ml Final    RITALINIC ACID 05/23/2023 Negative  50 ng/ml ng/ml Final    CARISOPRODOL 05/23/2023 Negative  100 ng/ml ng/ml Final    GABAPENTIN 05/23/2023 Negative  500 ng/ml ng/ml Final    PREGABALIN 05/23/2023 Negative  250 ng/ml ng/ml Final    ZOLPIDEM 05/23/2023 Negative  2 ng/ml ng/ml Final    CARBOXYZOLPIDEM 05/23/2023  Negative  10 ng/ml ng/ml Final       Mental Status Exam  Hygiene:  good  Dress: casual  Attitude: cooperative and agreeable   Motor Activity: appropriate  Eye Contact:  good  Speech: regular rate and rhythm   Mood:  calm and cooperative  Affect:  Appropriate  Thought Processes:  Linear  Thought Content:  Normal  Suicidal Thoughts:  denies  Homicidal Thoughts:  denies  Crisis Safety Plan: Safety plan has been discussed.   Hallucinations:  Unknown to clinician.   Reliability: fair  Insight: fair  Judgement: fair  Impulse Control: fair    Recovery/spiritual support group attendance: No.      Progress toward goal: Not at goal    Prognosis: Fair with Ongoing Treatment     Self-reported number of days sober: Patient reported 82 on check in form.     Patient will contact this office, call 911 or present to the nearest emergency room should suicidal or homicidal ideations occur.    Impression/Formulation:    ICD-10-CM ICD-9-CM   1. Opioid use disorder, severe, dependence  F11.20 304.00       Clinical Maneuvering/Interventions: Therapist utilized a person-centered approach to build rapport with group member. Therapist implemented motivational interviewing techniques to assist client with exploring and resolving ambivalence associated with commitment to change behaviors related to substance use and addiction. Therapist applied cognitive behavioral strategies to facilitate identification of maladaptive patterns of thinking and behavior that contribute to client's risk for continued substance use and relapse. Therapist employed group interaction activities to build rapport among group members, promote sobriety, and emphasize relapse prevention. Therapist promoted safe nonjudgmental environment by providing group members with unconditional positive regard and encouraging group members to comply with group rules and guidelines. Therapist assisted group member with identifying and implementing healthier coping strategies.       Plan:  Continue [facility] IOP Phase I   Aftercare:  [facility] Phase II  Program Assignments:  Personal recovery plan, relapse prevention plan, attendance of recovery support group meetings, exploration of sponsorship, drug/alcohol screens.     Ta Ambrose LCSW  6/8/2023  11:16 EDT

## 2023-06-12 NOTE — PROGRESS NOTES
"[facility] GROUP     Date: 06/08/2023  Name: Taye Ahmadi    Time In: 1800   Time Out: 2045     Number of participants: 9.    IOP GROUP NOTE     Data: 3 hour IOP group therapy session (Check-ins, Coping Skills, Relapse Prevention)     Check Ins: Therapist continued facilitation of rapport building strategies between group members. Therapist asked that each patient check in with home life and recovery efforts and identify triggers, cravings, and high risk situations that arise between group sessions. Therapist provided empathy and support during group session.     Session Content/Coping Skills: Check ins completed by group members. Annemarie, joined for first part of meeting.  answered questions about addiction and recovery presented by the group members. Clinician and  met with each group member individually to discuss progress in group. YouTube video titled “Ty Wells shares his incredible stary of survival and why fame wasn’t the answer to his problems” (Q with [employer] channel) viewed and take aways from the video discussed with the group.      Response: Patient attended class in person. Patient participated in completion of check in form. Patient on check in form answered no to question \"currently or since your last group meeting, have you had any suicidal thoughts or plan or intent to hurt yourself”, and patient also answered no on check in form to question of \"currently or since your last group meeting, have you had any homicidal thoughts or plan or intent to hurt others\".     Personal Assessment 0-10 Scale (0-none, 10-high)    Anxiety:  2   Depression:  0   Cravings: 1     Assessment:     ..  Lab on 06/08/2023   Component Date Value Ref Range Status    THC, Screen, Urine 06/08/2023 Negative  Negative Final    Phencyclidine (PCP), Urine 06/08/2023 Negative  Negative Final    Cocaine Screen, Urine 06/08/2023 Negative  Negative Final    " Methamphetamine, Ur 06/08/2023 Negative  Negative Final    Opiate Screen 06/08/2023 Negative  Negative Final    Amphetamine Screen, Urine 06/08/2023 Negative  Negative Final    Benzodiazepine Screen, Urine 06/08/2023 Negative  Negative Final    Tricyclic Antidepressants Screen 06/08/2023 Negative  Negative Final    Methadone Screen, Urine 06/08/2023 Negative  Negative Final    Barbiturates Screen, Urine 06/08/2023 Negative  Negative Final    Oxycodone Screen, Urine 06/08/2023 Negative  Negative Final    Propoxyphene Screen 06/08/2023 Negative  Negative Final    Buprenorphine, Screen, Urine 06/08/2023 Negative  Negative Final    PH 06/08/2023 6.4  4.5 - 9 Final    CREATININE 06/08/2023 41.3  20 - 300 mg/dL mg/dL Final    SPECIFIC GRAVITY 06/08/2023 1.004  1.003 - 1.035 Final    CODEINE 06/08/2023 Negative  50 ng/ml ng/ml Final    HYDROCODONE 06/08/2023 Negative  50 ng/ml ng/ml Final    NORHYDROCODONE 06/08/2023 Negative  50 ng/ml ng/ml Final    HYDROMORPHONE 06/08/2023 Negative  50 ng/ml ng/ml Final    MORPHINE 06/08/2023 Negative  50 ng/ml ng/ml Final    FENTANYL 06/08/2023 Negative  2 ng/ml ng/ml Final    NORFENTANYL 06/08/2023 Negative  10 ng/ml ng/ml Final    METHADONE 06/08/2023 Negative  25 ng/ml ng/ml Final    EDDP 06/08/2023 Negative  50 ng/ml ng/ml Final    OXYCODONE 06/08/2023 Negative  50 ng/ml ng/ml Final    NOROXYCODONE 06/08/2023 Negative  50 ng/ml ng/ml Final    OXYMORPHONE 06/08/2023 Negative  50 ng/ml ng/ml Final    TAPENTADOL 06/08/2023 Negative  50 ng/ml ng/ml Final    TRAMADOL 06/08/2023 Negative  50 ng/ml ng/ml Final    BUPRENORPHINE 06/08/2023 Negative  7.5 ng/ml ng/ml Final    NORBUPRENORPHINE 06/08/2023 Negative  10 ng/ml ng/ml Final    AMOBARBITAL 06/08/2023 Negative  100 ng/ml ng/ml Final    BUTABARBITAL 06/08/2023 Negative  100 ng/ml ng/ml Final    BUTALBITAL 06/08/2023 Negative  100 ng/ml ng/ml Final    PHENOBARBITAL 06/08/2023 Negative  100 ng/ml ng/ml Final    SECOBARBITAL 06/08/2023  Negative  100 ng/ml ng/ml Final    ALPRAZOLAM 06/08/2023 Negative  50 ng/ml ng/ml Final    ALPHA-HYDROXYALPRAZOLAM 06/08/2023 Negative  50 ng/ml ng/ml Final    CLONAZEPAM 06/08/2023 Negative  50 ng/ml ng/ml Final    7- AMINOCLONAZEPAM 06/08/2023 Negative  50 ng/ml ng/ml Final    DIAZEPAM 06/08/2023 Negative  50 ng/ml ng/ml Final    NORDIAZEPAM 06/08/2023 Negative  50 ng/ml ng/ml Final    LORAZEPAM 06/08/2023 Negative  100 ng/ml ng/ml Final    MIDAZOLAM 06/08/2023 Negative  50 ng/ml ng/ml Final    ALPHA-HYDROXYMIDAZOLAM 06/08/2023 Negative  50 ng/ml ng/ml Final    OXAZEPAM 06/08/2023 Negative  50 ng/ml ng/ml Final    TEMAZEPAM 06/08/2023 Negative  50 ng/ml ng/ml Final    ETG 06/08/2023 Negative  200 ng/ml ng/ml Final    BENZOYLECGONINE 06/08/2023 Negative  100 ng/ml ng/ml Final    6-JEREMIAS 06/08/2023 Negative  10 ng/ml ng/ml Final    MDMA 06/08/2023 Negative  100 ng/ml ng/ml Final    PCP 06/08/2023 Negative  20 ng/ml ng/ml Final    DELTA-9-THC 06/08/2023 Negative  20 ng/ml ng/ml Final    AMPHETAMINE 06/08/2023 Negative  250 ng/ml ng/ml Final    METHAMPHETAMINE 06/08/2023 Negative  100 ng/ml ng/ml Final    METHYLPHENIDATE 06/08/2023 Negative  10 ng/ml ng/ml Final    PHENTERMINE 06/08/2023 Negative  100 ng/ml ng/ml Final    RITALINIC ACID 06/08/2023 Negative  50 ng/ml ng/ml Final    CARISOPRODOL 06/08/2023 Negative  100 ng/ml ng/ml Final    GABAPENTIN 06/08/2023 Negative  500 ng/ml ng/ml Final    PREGABALIN 06/08/2023 Negative  250 ng/ml ng/ml Final    ZOLPIDEM 06/08/2023 Negative  2 ng/ml ng/ml Final    CARBOXYZOLPIDEM 06/08/2023 Negative  10 ng/ml ng/ml Final   Admission on 05/31/2023, Discharged on 05/31/2023   Component Date Value Ref Range Status    SARS Antigen 05/31/2023 Not Detected  Not Detected, Presumptive Negative Final    Influenza A Antigen DULCE 05/31/2023 Not Detected  Not Detected Final    Influenza B Antigen DULCE 05/31/2023 Not Detected  Not Detected Final    Internal Control 05/31/2023 Passed  Passed Final     Lot Number 05/31/2023 [id number]   Final    Expiration Date 05/31/2023 03/23/2024   Final    Rapid Strep A Screen 05/31/2023 Negative   Final    Internal Control 05/31/2023 Passed   Final    Lot Number 05/31/2023 [id number]   Final    Expiration Date 05/31/2023 09/30/2024   Final   Lab on 05/30/2023   Component Date Value Ref Range Status    THC, Screen, Urine 05/30/2023 Negative  Negative Final    Phencyclidine (PCP), Urine 05/30/2023 Negative  Negative Final    Cocaine Screen, Urine 05/30/2023 Negative  Negative Final    Methamphetamine, Ur 05/30/2023 Negative  Negative Final    Opiate Screen 05/30/2023 Negative  Negative Final    Amphetamine Screen, Urine 05/30/2023 Negative  Negative Final    Benzodiazepine Screen, Urine 05/30/2023 Negative  Negative Final    Tricyclic Antidepressants Screen 05/30/2023 Negative  Negative Final    Methadone Screen, Urine 05/30/2023 Negative  Negative Final    Barbiturates Screen, Urine 05/30/2023 Negative  Negative Final    Oxycodone Screen, Urine 05/30/2023 Negative  Negative Final    Propoxyphene Screen 05/30/2023 Negative  Negative Final    Buprenorphine, Screen, Urine 05/30/2023 Negative  Negative Final    PH 05/30/2023 8.4  4.5 - 9 Final    CREATININE 05/30/2023 62.7  20 - 300 mg/dL mg/dL Final    SPECIFIC GRAVITY 05/30/2023 1.010  1.003 - 1.035 Final    CODEINE 05/30/2023 Negative  50 ng/ml ng/ml Final    HYDROCODONE 05/30/2023 Negative  50 ng/ml ng/ml Final    NORHYDROCODONE 05/30/2023 Negative  50 ng/ml ng/ml Final    HYDROMORPHONE 05/30/2023 Negative  50 ng/ml ng/ml Final    MORPHINE 05/30/2023 Negative  50 ng/ml ng/ml Final    FENTANYL 05/30/2023 Negative  2 ng/ml ng/ml Final    NORFENTANYL 05/30/2023 Negative  10 ng/ml ng/ml Final    METHADONE 05/30/2023 Negative  25 ng/ml ng/ml Final    EDDP 05/30/2023 Negative  50 ng/ml ng/ml Final    OXYCODONE 05/30/2023 Negative  50 ng/ml ng/ml Final    NOROXYCODONE 05/30/2023 Negative  50 ng/ml ng/ml Final    OXYMORPHONE 05/30/2023  Negative  50 ng/ml ng/ml Final    TAPENTADOL 05/30/2023 Negative  50 ng/ml ng/ml Final    TRAMADOL 05/30/2023 Negative  50 ng/ml ng/ml Final    BUPRENORPHINE 05/30/2023 Negative  7.5 ng/ml ng/ml Final    NORBUPRENORPHINE 05/30/2023 Negative  10 ng/ml ng/ml Final    AMOBARBITAL 05/30/2023 Negative  100 ng/ml ng/ml Final    BUTABARBITAL 05/30/2023 Negative  100 ng/ml ng/ml Final    BUTALBITAL 05/30/2023 Negative  100 ng/ml ng/ml Final    PHENOBARBITAL 05/30/2023 Negative  100 ng/ml ng/ml Final    SECOBARBITAL 05/30/2023 Negative  100 ng/ml ng/ml Final    ALPRAZOLAM 05/30/2023 Negative  50 ng/ml ng/ml Final    ALPHA-HYDROXYALPRAZOLAM 05/30/2023 Negative  50 ng/ml ng/ml Final    CLONAZEPAM 05/30/2023 Negative  50 ng/ml ng/ml Final    7- AMINOCLONAZEPAM 05/30/2023 Negative  50 ng/ml ng/ml Final    DIAZEPAM 05/30/2023 Negative  50 ng/ml ng/ml Final    NORDIAZEPAM 05/30/2023 Negative  50 ng/ml ng/ml Final    LORAZEPAM 05/30/2023 Negative  100 ng/ml ng/ml Final    MIDAZOLAM 05/30/2023 Negative  50 ng/ml ng/ml Final    ALPHA-HYDROXYMIDAZOLAM 05/30/2023 Negative  50 ng/ml ng/ml Final    OXAZEPAM 05/30/2023 Negative  50 ng/ml ng/ml Final    TEMAZEPAM 05/30/2023 Negative  50 ng/ml ng/ml Final    ETG 05/30/2023 Negative  200 ng/ml ng/ml Final    BENZOYLECGONINE 05/30/2023 Negative  100 ng/ml ng/ml Final    6-JEREMIAS 05/30/2023 Negative  10 ng/ml ng/ml Final    MDMA 05/30/2023 Negative  100 ng/ml ng/ml Final    PCP 05/30/2023 Negative  20 ng/ml ng/ml Final    DELTA-9-THC 05/30/2023 Negative  20 ng/ml ng/ml Final    AMPHETAMINE 05/30/2023 Negative  250 ng/ml ng/ml Final    METHAMPHETAMINE 05/30/2023 Negative  100 ng/ml ng/ml Final    METHYLPHENIDATE 05/30/2023 Negative  10 ng/ml ng/ml Final    PHENTERMINE 05/30/2023 Negative  100 ng/ml ng/ml Final    RITALINIC ACID 05/30/2023 Negative  50 ng/ml ng/ml Final    CARISOPRODOL 05/30/2023 Negative  100 ng/ml ng/ml Final    GABAPENTIN 05/30/2023 Negative  500 ng/ml ng/ml Final    PREGABALIN  05/30/2023 Negative  250 ng/ml ng/ml Final    ZOLPIDEM 05/30/2023 Negative  2 ng/ml ng/ml Final    CARBOXYZOLPIDEM 05/30/2023 Negative  10 ng/ml ng/ml Final   Lab on 05/23/2023   Component Date Value Ref Range Status    THC, Screen, Urine 05/23/2023 Negative  Negative Final    Phencyclidine (PCP), Urine 05/23/2023 Negative  Negative Final    Cocaine Screen, Urine 05/23/2023 Negative  Negative Final    Methamphetamine, Ur 05/23/2023 Negative  Negative Final    Opiate Screen 05/23/2023 Negative  Negative Final    Amphetamine Screen, Urine 05/23/2023 Negative  Negative Final    Benzodiazepine Screen, Urine 05/23/2023 Negative  Negative Final    Tricyclic Antidepressants Screen 05/23/2023 Negative  Negative Final    Methadone Screen, Urine 05/23/2023 Negative  Negative Final    Barbiturates Screen, Urine 05/23/2023 Negative  Negative Final    Oxycodone Screen, Urine 05/23/2023 Negative  Negative Final    Propoxyphene Screen 05/23/2023 Negative  Negative Final    Buprenorphine, Screen, Urine 05/23/2023 Negative  Negative Final    PH 05/23/2023 8.1  4.5 - 9 Final    CREATININE 05/23/2023 42.9  20 - 300 mg/dL mg/dL Final    SPECIFIC GRAVITY 05/23/2023 1.006  1.003 - 1.035 Final    CODEINE 05/23/2023 Negative  50 ng/ml ng/ml Final    HYDROCODONE 05/23/2023 Negative  50 ng/ml ng/ml Final    NORHYDROCODONE 05/23/2023 Negative  50 ng/ml ng/ml Final    HYDROMORPHONE 05/23/2023 Negative  50 ng/ml ng/ml Final    MORPHINE 05/23/2023 Negative  50 ng/ml ng/ml Final    FENTANYL 05/23/2023 Negative  2 ng/ml ng/ml Final    NORFENTANYL 05/23/2023 Negative  10 ng/ml ng/ml Final    METHADONE 05/23/2023 Negative  25 ng/ml ng/ml Final    EDDP 05/23/2023 Negative  50 ng/ml ng/ml Final    OXYCODONE 05/23/2023 Negative  50 ng/ml ng/ml Final    NOROXYCODONE 05/23/2023 Negative  50 ng/ml ng/ml Final    OXYMORPHONE 05/23/2023 Negative  50 ng/ml ng/ml Final    TAPENTADOL 05/23/2023 Negative  50 ng/ml ng/ml Final    TRAMADOL 05/23/2023 Negative  50  ng/ml ng/ml Final    BUPRENORPHINE 05/23/2023 Negative  7.5 ng/ml ng/ml Final    NORBUPRENORPHINE 05/23/2023 Negative  10 ng/ml ng/ml Final    AMOBARBITAL 05/23/2023 Negative  100 ng/ml ng/ml Final    BUTABARBITAL 05/23/2023 Negative  100 ng/ml ng/ml Final    BUTALBITAL 05/23/2023 Negative  100 ng/ml ng/ml Final    PHENOBARBITAL 05/23/2023 Negative  100 ng/ml ng/ml Final    SECOBARBITAL 05/23/2023 Negative  100 ng/ml ng/ml Final    ALPRAZOLAM 05/23/2023 Negative  50 ng/ml ng/ml Final    ALPHA-HYDROXYALPRAZOLAM 05/23/2023 Negative  50 ng/ml ng/ml Final    CLONAZEPAM 05/23/2023 Negative  50 ng/ml ng/ml Final    7- AMINOCLONAZEPAM 05/23/2023 Negative  50 ng/ml ng/ml Final    DIAZEPAM 05/23/2023 Negative  50 ng/ml ng/ml Final    NORDIAZEPAM 05/23/2023 Negative  50 ng/ml ng/ml Final    LORAZEPAM 05/23/2023 Negative  100 ng/ml ng/ml Final    MIDAZOLAM 05/23/2023 Negative  50 ng/ml ng/ml Final    ALPHA-HYDROXYMIDAZOLAM 05/23/2023 Negative  50 ng/ml ng/ml Final    OXAZEPAM 05/23/2023 Negative  50 ng/ml ng/ml Final    TEMAZEPAM 05/23/2023 Negative  50 ng/ml ng/ml Final    ETG 05/23/2023 Negative  200 ng/ml ng/ml Final    BENZOYLECGONINE 05/23/2023 Negative  100 ng/ml ng/ml Final    6-JEREMIAS 05/23/2023 Negative  10 ng/ml ng/ml Final    MDMA 05/23/2023 Negative  100 ng/ml ng/ml Final    PCP 05/23/2023 Negative  20 ng/ml ng/ml Final    DELTA-9-THC 05/23/2023 Negative  20 ng/ml ng/ml Final    AMPHETAMINE 05/23/2023 Negative  250 ng/ml ng/ml Final    METHAMPHETAMINE 05/23/2023 Negative  100 ng/ml ng/ml Final    METHYLPHENIDATE 05/23/2023 Negative  10 ng/ml ng/ml Final    PHENTERMINE 05/23/2023 Negative  100 ng/ml ng/ml Final    RITALINIC ACID 05/23/2023 Negative  50 ng/ml ng/ml Final    CARISOPRODOL 05/23/2023 Negative  100 ng/ml ng/ml Final    GABAPENTIN 05/23/2023 Negative  500 ng/ml ng/ml Final    PREGABALIN 05/23/2023 Negative  250 ng/ml ng/ml Final    ZOLPIDEM 05/23/2023 Negative  2 ng/ml ng/ml Final    CARBOXYZOLPIDEM  05/23/2023 Negative  10 ng/ml ng/ml Final       Mental Status Exam  Hygiene:  good  Dress: casual  Attitude: cooperative and agreeable   Motor Activity: appropriate  Eye Contact:  good  Speech: regular rate and rhythm   Mood:  calm and cooperative  Affect:  Appropriate  Thought Processes:  Linear  Thought Content:  Normal  Suicidal Thoughts:  denies  Homicidal Thoughts:  denies  Crisis Safety Plan: Safety plan has been discussed.   Hallucinations:  Unknown to clinician.   Reliability: fair  Insight: fair  Judgement: fair  Impulse Control: fair    Recovery/spiritual support group attendance: No.     Progress toward goal: Not at goal    Prognosis: Fair with Ongoing Treatment     Self-reported number of days sober: Patient reported 83 on check in form.     Patient will contact this office, call 911 or present to the nearest emergency room should suicidal or homicidal ideations occur.    Impression/Formulation:    ICD-10-CM ICD-9-CM   1. Opioid use disorder, severe, dependence  F11.20 304.00       Clinical Maneuvering/Interventions: Therapist utilized a person-centered approach to build rapport with group member. Therapist implemented motivational interviewing techniques to assist client with exploring and resolving ambivalence associated with commitment to change behaviors related to substance use and addiction. Therapist applied cognitive behavioral strategies to facilitate identification of maladaptive patterns of thinking and behavior that contribute to client's risk for continued substance use and relapse. Therapist employed group interaction activities to build rapport among group members, promote sobriety, and emphasize relapse prevention. Therapist promoted safe nonjudgmental environment by providing group members with unconditional positive regard and encouraging group members to comply with group rules and guidelines. Therapist assisted group member with identifying and implementing healthier coping  strategies.      Plan:  Continue [facility] IOP Phase I   Aftercare:  [facility] Phase II  Program Assignments:  Personal recovery plan, relapse prevention plan, attendance of recovery support group meetings, exploration of sponsorship, drug/alcohol screens.     Ta Ambrose LCSW  6/14/2023  13:34 EDT

## 2023-06-13 ENCOUNTER — LAB (OUTPATIENT)
Dept: LAB | Facility: HOSPITAL | Age: 38
End: 2023-06-13
Payer: MEDICAID

## 2023-06-13 DIAGNOSIS — F11.20 OPIOID USE DISORDER, SEVERE, DEPENDENCE: ICD-10-CM

## 2023-06-13 LAB
1OH-MIDAZOLAM UR CFM-MCNC: NEGATIVE NG/ML
6MAM UR CFM-MCNC: NEGATIVE NG/ML
7AMINOCLONAZEPAM UR CFM-MCNC: NEGATIVE NG/ML
7AMINOCLONAZEPAM UR CFM-MCNC: NEGATIVE NG/ML
A-OH ALPRAZ UR CFM-MCNC: NEGATIVE NG/ML
ALPRAZ UR CFM-MCNC: NEGATIVE NG/ML
AMOBARBITAL UR CFM-MCNC: NEGATIVE NG/ML
AMPHET UR CFM-MCNC: NEGATIVE NG/ML
AMPHET+METHAMPHET UR QL: NEGATIVE
AMPHETAMINES UR QL: NEGATIVE
BARBITURATES UR QL SCN: NEGATIVE
BENZODIAZ UR QL SCN: NEGATIVE
BUPRENORPHINE SERPL-MCNC: NEGATIVE NG/ML
BUPRENORPHINE UR-MCNC: NEGATIVE NG/ML
BUTABARBITAL UR CFM-MCNC: NEGATIVE NG/ML
BUTALBITAL UR CFM-MCNC: NEGATIVE NG/ML
BZE UR CFM-MCNC: NEGATIVE NG/ML
CANNABINOIDS SERPL QL: NEGATIVE
CARBOXYTHC UR CFM-MCNC: NEGATIVE NG/ML
CARISOPRODOL UR CFM-MCNC: NEGATIVE NG/ML
COCAINE UR QL: NEGATIVE
CODEINE UR CFM-MCNC: NEGATIVE NG/ML
CREATININE: 41.3 MG/DL
DIAZEPAM UR CFM-MCNC: NEGATIVE NG/ML
EDDP UR CFM-MCNC: NEGATIVE NG/ML
ETHYL GLUCURONIDE UR CFM-MCNC: NEGATIVE NG/ML
FENTANYL UR-MCNC: NEGATIVE NG/ML
GABAPENTIN UR CFM-MCNC: NEGATIVE NG/ML
HYDROCODONE UR CFM-MCNC: NEGATIVE NG/ML
HYDROMORPHONE UR CFM-MCNC: NEGATIVE NG/ML
LORAZEPAM UR CFM-MCNC: NEGATIVE NG/ML
MDMA UR CFM-MCNC: NEGATIVE NG/ML
ME-PHENIDATE UR CFM-MCNC: NEGATIVE NG/ML
METHADONE UR CFM-MCNC: NEGATIVE NG/ML
METHADONE UR QL SCN: NEGATIVE
METHAMPHET UR CFM-MCNC: NEGATIVE NG/ML
MIDAZOLAM UR CFM-MCNC: NEGATIVE NG/ML
MORPHINE UR CFM-MCNC: NEGATIVE NG/ML
NORBUPRENORPHINE UR CFM-MCNC: NEGATIVE NG/ML
NORDIAZEPAM UR CFM-MCNC: NEGATIVE NG/ML
NORFENTANYL UR CFM-MCNC: NEGATIVE NG/ML
NORHYDROCODONE UR CFM-MCNC: NEGATIVE NG/ML
NOROXYCODONE UR CFM-MCNC: NEGATIVE NG/ML
OPIATES UR QL: NEGATIVE
OXAZEPAM CTO UR CFM-MCNC: NEGATIVE NG/ML
OXYCODONE SERPL-MCNC: NEGATIVE NG/ML
OXYCODONE UR QL SCN: NEGATIVE
OXYMORPHONE SERPL-MCNC: NEGATIVE NG/ML
PCP UR CFM-MCNC: NEGATIVE NG/ML
PCP UR QL SCN: NEGATIVE
PH: 6.4 (ref 4.5–9)
PHENOBARB UR CFM-MCNC: NEGATIVE NG/ML
PHENTERMINE: NEGATIVE NG/ML
PPAA UR CFM-MCNC: NEGATIVE NG/ML
PREGABALIN UR CFM-MCNC: NEGATIVE NG/ML
PROPOXYPH UR QL: NEGATIVE
SECOBARBITAL UR CFM-MCNC: NEGATIVE NG/ML
SPECIFIC GRAVITY: 1 (ref 1–1.03)
TAPENTADOL UR CFM-MCNC: NEGATIVE NG/ML
TEMAZEPAM UR CFM-MCNC: NEGATIVE NG/ML
TRAMADOL: NEGATIVE NG/ML
TRICYCLICS UR QL SCN: NEGATIVE
ZOLPIDEM PHENYL-4-CARB UR CFM-MCNC: NEGATIVE NG/ML
ZOLPIDEM UR CFM-MCNC: NEGATIVE NG/ML

## 2023-06-13 PROCEDURE — 80306 DRUG TEST PRSMV INSTRMNT: CPT

## 2023-06-19 ENCOUNTER — DOCUMENTATION (OUTPATIENT)
Dept: PSYCHIATRY | Facility: HOSPITAL | Age: 38
End: 2023-06-19
Payer: MEDICAID

## 2023-06-19 LAB
1OH-MIDAZOLAM UR CFM-MCNC: NEGATIVE NG/ML
6MAM UR CFM-MCNC: NEGATIVE NG/ML
7AMINOCLONAZEPAM UR CFM-MCNC: NEGATIVE NG/ML
7AMINOCLONAZEPAM UR CFM-MCNC: NEGATIVE NG/ML
A-OH ALPRAZ UR CFM-MCNC: NEGATIVE NG/ML
ALPRAZ UR CFM-MCNC: NEGATIVE NG/ML
AMOBARBITAL UR CFM-MCNC: NEGATIVE NG/ML
AMPHET UR CFM-MCNC: NEGATIVE NG/ML
BUPRENORPHINE UR-MCNC: NEGATIVE NG/ML
BUTABARBITAL UR CFM-MCNC: NEGATIVE NG/ML
BUTALBITAL UR CFM-MCNC: NEGATIVE NG/ML
BZE UR CFM-MCNC: NEGATIVE NG/ML
CARBOXYTHC UR CFM-MCNC: NEGATIVE NG/ML
CARISOPRODOL UR CFM-MCNC: NEGATIVE NG/ML
CODEINE UR CFM-MCNC: NEGATIVE NG/ML
CREATININE: 163.1 MG/DL
DIAZEPAM UR CFM-MCNC: NEGATIVE NG/ML
EDDP UR CFM-MCNC: NEGATIVE NG/ML
ETHYL GLUCURONIDE UR CFM-MCNC: NEGATIVE NG/ML
FENTANYL UR-MCNC: NEGATIVE NG/ML
GABAPENTIN UR CFM-MCNC: NEGATIVE NG/ML
HYDROCODONE UR CFM-MCNC: NEGATIVE NG/ML
HYDROMORPHONE UR CFM-MCNC: NEGATIVE NG/ML
LORAZEPAM UR CFM-MCNC: NEGATIVE NG/ML
MDMA UR CFM-MCNC: NEGATIVE NG/ML
ME-PHENIDATE UR CFM-MCNC: NEGATIVE NG/ML
METHADONE UR CFM-MCNC: NEGATIVE NG/ML
METHAMPHET UR CFM-MCNC: NEGATIVE NG/ML
MIDAZOLAM UR CFM-MCNC: NEGATIVE NG/ML
MORPHINE UR CFM-MCNC: NEGATIVE NG/ML
NORBUPRENORPHINE UR CFM-MCNC: NEGATIVE NG/ML
NORDIAZEPAM UR CFM-MCNC: NEGATIVE NG/ML
NORFENTANYL UR CFM-MCNC: NEGATIVE NG/ML
NORHYDROCODONE UR CFM-MCNC: NEGATIVE NG/ML
NOROXYCODONE UR CFM-MCNC: NEGATIVE NG/ML
OXAZEPAM CTO UR CFM-MCNC: NEGATIVE NG/ML
OXYCODONE SERPL-MCNC: NEGATIVE NG/ML
OXYMORPHONE SERPL-MCNC: NEGATIVE NG/ML
PCP UR CFM-MCNC: NEGATIVE NG/ML
PH: 7.3 (ref 4.5–9)
PHENOBARB UR CFM-MCNC: NEGATIVE NG/ML
PHENTERMINE: NEGATIVE NG/ML
PPAA UR CFM-MCNC: NEGATIVE NG/ML
PREGABALIN UR CFM-MCNC: NEGATIVE NG/ML
SECOBARBITAL UR CFM-MCNC: NEGATIVE NG/ML
SPECIFIC GRAVITY: 1.02 (ref 1–1.03)
TAPENTADOL UR CFM-MCNC: NEGATIVE NG/ML
TEMAZEPAM UR CFM-MCNC: NEGATIVE NG/ML
TRAMADOL: NEGATIVE NG/ML
ZOLPIDEM PHENYL-4-CARB UR CFM-MCNC: NEGATIVE NG/ML
ZOLPIDEM UR CFM-MCNC: NEGATIVE NG/ML

## 2023-06-29 PROBLEM — F11.21 OPIOID USE DISORDER, SEVERE, IN EARLY REMISSION: Status: ACTIVE | Noted: 2023-06-29

## 2023-07-20 ENCOUNTER — LAB (OUTPATIENT)
Dept: LAB | Facility: HOSPITAL | Age: 38
End: 2023-07-20
Payer: MEDICAID

## 2023-07-20 DIAGNOSIS — F11.20 OPIOID USE DISORDER, SEVERE, DEPENDENCE: ICD-10-CM

## 2023-07-20 LAB
ETHANOL BLD-MCNC: <10 MG/DL (ref 0–10)
ETHANOL UR QL: <0.01 %

## 2023-07-20 PROCEDURE — 36415 COLL VENOUS BLD VENIPUNCTURE: CPT

## 2023-07-20 PROCEDURE — 82077 ASSAY SPEC XCP UR&BREATH IA: CPT

## 2023-07-20 PROCEDURE — 80307 DRUG TEST PRSMV CHEM ANLYZR: CPT

## 2023-07-24 ENCOUNTER — OFFICE VISIT (OUTPATIENT)
Dept: PSYCHIATRY | Facility: CLINIC | Age: 38
End: 2023-07-24
Payer: MEDICAID

## 2023-07-24 ENCOUNTER — LAB (OUTPATIENT)
Dept: LAB | Facility: HOSPITAL | Age: 38
End: 2023-07-24
Payer: MEDICAID

## 2023-07-24 DIAGNOSIS — F11.21 OPIOID USE DISORDER, SEVERE, IN EARLY REMISSION: Primary | ICD-10-CM

## 2023-07-24 DIAGNOSIS — F11.20 OPIOID USE DISORDER, SEVERE, DEPENDENCE: ICD-10-CM

## 2023-07-24 LAB
ETHANOL BLD-MCNC: <10 MG/DL (ref 0–10)
ETHANOL UR QL: <0.01 %

## 2023-07-24 PROCEDURE — 82077 ASSAY SPEC XCP UR&BREATH IA: CPT

## 2023-07-24 PROCEDURE — 1160F RVW MEDS BY RX/DR IN RCRD: CPT | Performed by: COUNSELOR

## 2023-07-24 PROCEDURE — 36415 COLL VENOUS BLD VENIPUNCTURE: CPT

## 2023-07-24 PROCEDURE — 1159F MED LIST DOCD IN RCRD: CPT | Performed by: COUNSELOR

## 2023-07-24 PROCEDURE — 90853 GROUP PSYCHOTHERAPY: CPT | Performed by: COUNSELOR

## 2023-07-24 PROCEDURE — 80307 DRUG TEST PRSMV CHEM ANLYZR: CPT

## 2023-07-25 NOTE — PROGRESS NOTES
"[facility] PHASE II / Phase III GROUP NOTE    Name: Taye Ahmadi  Date: July 24, 2023      Time in: 3:30  Time out: 4:30  Participants: 9     Data: 1 hour group therapy session (Check in, just for today, Matrix model looking forward; managing downtime)     Clinical Maneuvering/Interventions: Therapist utilized a person-centered approach to build rapport with group member. Therapist implemented motivational interviewing techniques to assist client with exploring and resolving ambivalence associated with commitment to change behaviors related to substance use and addiction. Therapist applied cognitive behavioral strategies to facilitate identification of maladaptive patterns of thinking and behavior that contribute to client's risk for continued substance use and relapse. Therapist employed group interaction activities to build rapport among group members, promote sobriety, and emphasize relapse prevention. Therapist promoted safe nonjudgmental environment by providing group members with unconditional positive regard and encouraging group members to comply with group rules and guidelines. Therapist assisted group member with identifying and implementing healthier coping strategies.     Response: Patient attended class in person. Patient participated in completion of check in form. Patient on check in form answered no to question \"currently or since last group meeting, have you had any suicidal thoughts or plan or intent to hurt yourself”, and patient also answered no to question of \"currently or since your last group meeting, have you had any homicidal thoughts or plan or intent to hurt others\".  Patient participated fully in group discussion.  Patient identified some unhealthy downtime activities that he used to participate in and identified some healthy ones that he is participating in now during his recovery.       On a 1:10 Scale (0-none, 10-high):    Anxiety: 3  Depression: 0  Cravings: 0    Assessment "     Diagnoses and all orders for this visit:    1. Opioid use disorder, severe, in early remission (Primary)             Progress toward goal: At goal    Functional Status: Moderate impairment     Prognosis: Good with Ongoing Treatment     Mental Status Exam:   Hygiene:   good  Cooperation:  Cooperative  Eye Contact:  Good  Psychomotor Behavior:  Appropriate  Affect:  Appropriate  Mood: normal  Speech:  Normal  Thought Process:  Linear  Thought Content:  Mood congruent  Suicidal:  None  Homicidal:  None  Hallucinations:  None  Delusion:  None  Memory:  Intact  Orientation:  Person  Reliability:  fair  Insight:  Fair  Judgement:  Fair  Impulse Control:  Fair  Physical/Medical Issues:  No      Assessment:  Engaged in activity/Process and self-disclosed: Yes  Affect:Appropriate   Applies topic to self: Yes  Able to give and receive feedback: Yes    Urinalysis: Negative on prelim dated 7/24/2023  Labs:   Last Urine Toxicity  More data exists         Latest Ref Rng & Units 6/20/2023 6/13/2023   LAST URINE TOXICITY RESULTS   Amphetamine, Urine Qual Negative Negative  Negative    Barbiturates Screen, Urine Negative Negative  Negative    Benzodiazepine Screen, Urine Negative Negative  Negative    Buprenorphine, Screen, Urine Negative Negative  Negative    Cocaine Screen, Urine Negative Negative  Negative    Methadone Screen , Urine Negative Negative  Negative    Methamphetamine, Ur Negative Negative  Negative         Self-Reported days since last use: 129 days    12-Step attendance: 0 meetings    Plan:   Patient will continue in weekly group psychotherapy sessions and individual outpatient psychotherapy sessions every 3-4 weeks and will continue in self-help meetings and seek additional treatment if necessary following completion.    Patient will continue in IOP Phase two and three group.      Safety plan has previously been discussed with patient.     EWA Collier  [unfilled]  09:19 EDT

## 2023-07-27 ENCOUNTER — OFFICE VISIT (OUTPATIENT)
Dept: PSYCHIATRY | Facility: CLINIC | Age: 38
End: 2023-07-27
Payer: MEDICAID

## 2023-07-27 DIAGNOSIS — F11.21 OPIOID USE DISORDER, SEVERE, IN EARLY REMISSION: Primary | ICD-10-CM

## 2023-07-28 LAB
AMPHETAMINES SPEC-MCNC: NEGATIVE NG/ML
BARBITURATES SERPLBLD QL: NEGATIVE UG/ML
BENZODIAZ BLD QL: NEGATIVE NG/ML
BUPRENORPHINE BLD QL SCN: NEGATIVE NG/ML
CANNABINOIDS BLD QL SCN: NEGATIVE NG/ML
CARISOPRODOL+MEPROB BLD QL SCN: NEGATIVE UG/ML
COCAINE+BZE SERPLBLD QL SCN: NEGATIVE NG/ML
FENTANYL BLD QL SCN: NEGATIVE NG/ML
GABAPENTIN SERPLBLD QL SCN: NEGATIVE UG/ML
MEPERIDINE SERPLBLD QL SCN: NEGATIVE NG/ML
METHADONE BLD QL SCN: NEGATIVE NG/ML
OPIATES BLD QL SCN: NEGATIVE NG/ML
OXYCODONE+OXYMORPHONE SERPLBLD QL SCN: NEGATIVE NG/ML
PCP BLD QL SCN: NEGATIVE NG/ML
PROPOXYPH BLD QL SCN: NEGATIVE NG/ML
TRAMADOL BLD QL SCN: NEGATIVE NG/ML

## 2023-07-28 NOTE — PROGRESS NOTES
"[facility] PHASE II / Phase III GROUP NOTE    Name: Taye Ahmadi  Date: July 27, 2023      Time in: 3:30  Time out: 4:30  Participants: 11     Data: 1 hour group therapy session (Check in, just for today, past, present, and future activity)     Clinical Maneuvering/Interventions: Therapist utilized a person-centered approach to build rapport with group member. Therapist implemented motivational interviewing techniques to assist client with exploring and resolving ambivalence associated with commitment to change behaviors related to substance use and addiction. Therapist applied cognitive behavioral strategies to facilitate identification of maladaptive patterns of thinking and behavior that contribute to client's risk for continued substance use and relapse. Therapist employed group interaction activities to build rapport among group members, promote sobriety, and emphasize relapse prevention. Therapist promoted safe nonjudgmental environment by providing group members with unconditional positive regard and encouraging group members to comply with group rules and guidelines. Therapist assisted group member with identifying and implementing healthier coping strategies.     Response: Patient attended class in person. Patient participated in completion of check in form. Patient on check in form answered no to question \"currently or since last group meeting, have you had any suicidal thoughts or plan or intent to hurt yourself”, and patient also answered no to question of \"currently or since your last group meeting, have you had any homicidal thoughts or plan or intent to hurt others\".  Patient participated fully in group discussion by identifying his patterns and struggles that he has had in his past.  Patient was able to identify positive activities that he does for his present and future with maintaining his sobriety.    On a 1:10 Scale (0-none, 10-high):    Anxiety: 3  Depression: 0  Cravings: 0    Assessment "     Diagnoses and all orders for this visit:    1. Opioid use disorder, severe, in early remission (Primary)             Progress toward goal: At goal    Functional Status: Moderate impairment     Prognosis: Good with Ongoing Treatment     Mental Status Exam:   Hygiene:   good  Cooperation:  Cooperative  Eye Contact:  Good  Psychomotor Behavior:  Appropriate  Affect:  Appropriate  Mood: normal  Speech:  Normal  Thought Process:  Linear  Thought Content:  Mood congruent  Suicidal:  None  Homicidal:  None  Hallucinations:  None  Delusion:  None  Memory:  Intact  Orientation:  Person  Reliability:  fair  Insight:  Fair  Judgement:  Fair  Impulse Control:  Fair  Physical/Medical Issues:  No      Assessment:  Engaged in activity/Process and self-disclosed: Yes  Affect:Appropriate   Applies topic to self: Yes  Able to give and receive feedback: Yes    Urinalysis: Negative  on prelim results dated 7/24/2023  Labs:   Last Urine Toxicity  More data exists         Latest Ref Rng & Units 6/20/2023 6/13/2023   LAST URINE TOXICITY RESULTS   Amphetamine, Urine Qual Negative Negative  Negative    Barbiturates Screen, Urine Negative Negative  Negative    Benzodiazepine Screen, Urine Negative Negative  Negative    Buprenorphine, Screen, Urine Negative Negative  Negative    Cocaine Screen, Urine Negative Negative  Negative    Methadone Screen , Urine Negative Negative  Negative    Methamphetamine, Ur Negative Negative  Negative         Self-Reported days since last use: 132 days    12-Step attendance: 0 meetings    Plan:   Patient will continue in weekly group psychotherapy sessions and individual outpatient psychotherapy sessions every 3-4 weeks and will continue in self-help meetings and seek additional treatment if necessary following completion.    Patient will continue in IOP Phase two and three group.      Safety plan has previously been discussed with patient.     EWA Collier  [unfilled]  07:40 EDT

## 2023-07-31 ENCOUNTER — LAB (OUTPATIENT)
Dept: LAB | Facility: HOSPITAL | Age: 38
End: 2023-07-31
Payer: MEDICAID

## 2023-07-31 ENCOUNTER — OFFICE VISIT (OUTPATIENT)
Dept: PSYCHIATRY | Facility: CLINIC | Age: 38
End: 2023-07-31
Payer: MEDICAID

## 2023-07-31 DIAGNOSIS — F11.20 OPIOID USE DISORDER, SEVERE, DEPENDENCE: ICD-10-CM

## 2023-07-31 DIAGNOSIS — F11.21 OPIOID USE DISORDER, SEVERE, IN EARLY REMISSION: Primary | ICD-10-CM

## 2023-07-31 LAB
ETHANOL BLD-MCNC: <10 MG/DL (ref 0–10)
ETHANOL UR QL: <0.01 %

## 2023-07-31 PROCEDURE — 82077 ASSAY SPEC XCP UR&BREATH IA: CPT

## 2023-07-31 PROCEDURE — 36415 COLL VENOUS BLD VENIPUNCTURE: CPT

## 2023-07-31 PROCEDURE — 1160F RVW MEDS BY RX/DR IN RCRD: CPT | Performed by: COUNSELOR

## 2023-07-31 PROCEDURE — 1159F MED LIST DOCD IN RCRD: CPT | Performed by: COUNSELOR

## 2023-07-31 PROCEDURE — 90853 GROUP PSYCHOTHERAPY: CPT | Performed by: COUNSELOR

## 2023-07-31 PROCEDURE — 80307 DRUG TEST PRSMV CHEM ANLYZR: CPT

## 2023-08-03 ENCOUNTER — OFFICE VISIT (OUTPATIENT)
Dept: PSYCHIATRY | Facility: CLINIC | Age: 38
End: 2023-08-03
Payer: MEDICAID

## 2023-08-03 DIAGNOSIS — F11.21 OPIOID USE DISORDER, SEVERE, IN EARLY REMISSION: Primary | ICD-10-CM

## 2023-08-07 ENCOUNTER — LAB (OUTPATIENT)
Dept: LAB | Facility: HOSPITAL | Age: 38
End: 2023-08-07
Payer: MEDICAID

## 2023-08-07 DIAGNOSIS — F11.20 OPIOID USE DISORDER, SEVERE, DEPENDENCE: ICD-10-CM

## 2023-08-07 LAB
ETHANOL BLD-MCNC: <10 MG/DL (ref 0–10)
ETHANOL UR QL: <0.01 %

## 2023-08-07 PROCEDURE — 82077 ASSAY SPEC XCP UR&BREATH IA: CPT

## 2023-08-07 PROCEDURE — 80307 DRUG TEST PRSMV CHEM ANLYZR: CPT

## 2023-08-07 PROCEDURE — 36415 COLL VENOUS BLD VENIPUNCTURE: CPT

## 2023-08-14 ENCOUNTER — LAB (OUTPATIENT)
Dept: LAB | Facility: HOSPITAL | Age: 38
End: 2023-08-14
Payer: MEDICAID

## 2023-08-14 ENCOUNTER — OFFICE VISIT (OUTPATIENT)
Dept: PSYCHIATRY | Facility: CLINIC | Age: 38
End: 2023-08-14
Payer: MEDICAID

## 2023-08-14 DIAGNOSIS — F11.21 OPIOID USE DISORDER, SEVERE, IN EARLY REMISSION: Primary | ICD-10-CM

## 2023-08-14 DIAGNOSIS — F11.20 OPIOID USE DISORDER, SEVERE, DEPENDENCE: ICD-10-CM

## 2023-08-14 LAB
ETHANOL BLD-MCNC: <10 MG/DL (ref 0–10)
ETHANOL BLD-MCNC: <10 MG/DL (ref 0–10)
ETHANOL UR QL: <0.01 %
ETHANOL UR QL: <0.01 %

## 2023-08-14 PROCEDURE — 36415 COLL VENOUS BLD VENIPUNCTURE: CPT

## 2023-08-14 PROCEDURE — 82077 ASSAY SPEC XCP UR&BREATH IA: CPT

## 2023-08-14 PROCEDURE — 80307 DRUG TEST PRSMV CHEM ANLYZR: CPT

## 2023-08-14 PROCEDURE — 90853 GROUP PSYCHOTHERAPY: CPT | Performed by: COUNSELOR

## 2023-08-15 NOTE — PROGRESS NOTES
"[facility] PHASE II / Phase III GROUP NOTE    Name: aTye Ahmadi  Date: August 14, 2023    Time in: 3:30  Time out: 4:30  Participants: 8    Data: 1 hour group therapy session (Check in, just for today, relapse justification)     Clinical Maneuvering/Interventions: Therapist utilized a person-centered approach to build rapport with group member. Therapist implemented motivational interviewing techniques to assist client with exploring and resolving ambivalence associated with commitment to change behaviors related to substance use and addiction. Therapist applied cognitive behavioral strategies to facilitate identification of maladaptive patterns of thinking and behavior that contribute to client's risk for continued substance use and relapse. Therapist employed group interaction activities to build rapport among group members, promote sobriety, and emphasize relapse prevention. Therapist promoted safe nonjudgmental environment by providing group members with unconditional positive regard and encouraging group members to comply with group rules and guidelines. Therapist assisted group member with identifying and implementing healthier coping strategies.     Response: Patient attended class in person. Patient participated in completion of check in form. Patient on check in form answered no to question \"currently or since last group meeting, have you had any suicidal thoughts or plan or intent to hurt yourself", and patient also answered no to question of \"currently or since your last group meeting, have you had any homicidal thoughts or plan or intent to hurt others\".  Patient participated fully in group discussion by identifying supports and things that he is utilized to help him with his recovery and maintaining his sobriety.  Patient was able to provide helpful resources and feedback to other members of the groups and also except feedback from the groups and things that he has been working on.    On a 1:10 Scale " (0-none, 10-high):    Anxiety: 3  Depression: 1  Cravings: 0    Assessment     Diagnoses and all orders for this visit:    1. Opioid use disorder, severe, in early remission (Primary)             Progress toward goal: At goal    Functional Status: Moderate impairment     Prognosis: Good with Ongoing Treatment     Mental Status Exam:   Hygiene:   good  Cooperation:  Cooperative  Eye Contact:  Good  Psychomotor Behavior:  Appropriate  Affect:  Appropriate  Mood: normal  Speech:  Normal  Thought Process:  Linear  Thought Content:  Mood congruent  Suicidal:  None  Homicidal:  None  Hallucinations:  None  Delusion:  None  Memory:  Intact  Orientation:  Person  Reliability:  fair  Insight:  Fair  Judgement:  Fair  Impulse Control:  Fair  Physical/Medical Issues:  No      Assessment:  Engaged in activity/Process and self-disclosed: Yes  Affect:Appropriate   Applies topic to self: Yes  Able to give and receive feedback: Yes    Urinalysis: Negative  on prelim dated 8/14/2023  Labs:   Last Urine Toxicity  More data exists         Latest Ref Rng & Units 6/20/2023 6/13/2023   LAST URINE TOXICITY RESULTS   Amphetamine, Urine Qual Negative Negative  Negative    Barbiturates Screen, Urine Negative Negative  Negative    Benzodiazepine Screen, Urine Negative Negative  Negative    Buprenorphine, Screen, Urine Negative Negative  Negative    Cocaine Screen, Urine Negative Negative  Negative    Methadone Screen , Urine Negative Negative  Negative    Methamphetamine, Ur Negative Negative  Negative         Self-Reported days since last use: 150 days    12-Step attendance: 0 meetings    Plan:   Patient will continue in weekly group psychotherapy sessions and individual outpatient psychotherapy sessions every 3-4 weeks and will continue in self-help meetings and seek additional treatment if necessary following completion.    Patient will continue in IOP Phase two and three group.      Safety plan has previously been discussed with patient.      Demetrius Bush, EWA  [unfilled]  08:00 EDT

## 2023-08-17 ENCOUNTER — OFFICE VISIT (OUTPATIENT)
Dept: PSYCHIATRY | Facility: CLINIC | Age: 38
End: 2023-08-17
Payer: MEDICAID

## 2023-08-17 DIAGNOSIS — F11.21 OPIOID USE DISORDER, SEVERE, IN EARLY REMISSION: Primary | ICD-10-CM

## 2023-08-18 NOTE — PROGRESS NOTES
"[facility] PHASE II / Phase III GROUP NOTE    Name: Taye Ahmadi  Date: August 17, 2023    Time in: 3:30  Time out: 4:30  Participants: 10    Data: 1 hour group therapy session (Check in, just for today, relapse justification)     Clinical Maneuvering/Interventions: Therapist utilized a person-centered approach to build rapport with group member. Therapist implemented motivational interviewing techniques to assist client with exploring and resolving ambivalence associated with commitment to change behaviors related to substance use and addiction. Therapist applied cognitive behavioral strategies to facilitate identification of maladaptive patterns of thinking and behavior that contribute to client's risk for continued substance use and relapse. Therapist employed group interaction activities to build rapport among group members, promote sobriety, and emphasize relapse prevention. Therapist promoted safe nonjudgmental environment by providing group members with unconditional positive regard and encouraging group members to comply with group rules and guidelines. Therapist assisted group member with identifying and implementing healthier coping strategies.     Response: Patient attended class in person. Patient participated in completion of check in form. Patient on check in form answered no to question \"currently or since last group meeting, have you had any suicidal thoughts or plan or intent to hurt yourself", and patient also answered no to question of \"currently or since your last group meeting, have you had any homicidal thoughts or plan or intent to hurt others\".  Patient participated in group discussion and provided helpful feedback to other members who are struggling with the recovery right now in group.  Patient identified some good techniques to help with relapse justification and identified helpful warning signs to other members of relapse justification.  Patient provided good feedback on how to improve the " group and setting boundaries with other members during group discussion.    On a 1:10 Scale (0-none, 10-high):    Anxiety: 1  Depression: 0  Cravings: 0    Assessment     Diagnoses and all orders for this visit:    1. Opioid use disorder, severe, in early remission (Primary)             Progress toward goal: At goal    Functional Status: Moderate impairment     Prognosis: Good with Ongoing Treatment     Mental Status Exam:   Hygiene:   good  Cooperation:  Cooperative  Eye Contact:  Good  Psychomotor Behavior:  Appropriate  Affect:  Appropriate  Mood: normal  Speech:  Normal  Thought Process:  Linear  Thought Content:  Mood congruent  Suicidal:  None  Homicidal:  None  Hallucinations:  None  Delusion:  None  Memory:  Intact  Orientation:  Person  Reliability:  fair  Insight:  Fair  Judgement:  Fair  Impulse Control:  Fair  Physical/Medical Issues:  No      Assessment:  Engaged in activity/Process and self-disclosed: Yes  Affect:Appropriate   Applies topic to self: Yes  Able to give and receive feedback: Yes    Urinalysis: Negative confirmed results on 8/14/2023  Labs:   Last Urine Toxicity  More data exists         Latest Ref Rng & Units 6/20/2023 6/13/2023   LAST URINE TOXICITY RESULTS   Amphetamine, Urine Qual Negative Negative  Negative    Barbiturates Screen, Urine Negative Negative  Negative    Benzodiazepine Screen, Urine Negative Negative  Negative    Buprenorphine, Screen, Urine Negative Negative  Negative    Cocaine Screen, Urine Negative Negative  Negative    Methadone Screen , Urine Negative Negative  Negative    Methamphetamine, Ur Negative Negative  Negative         Self-Reported days since last use: 153 days    12-Step attendance: 0 meetings    Plan:   Patient will continue in weekly group psychotherapy sessions and individual outpatient psychotherapy sessions every 3-4 weeks and will continue in self-help meetings and seek additional treatment if necessary following completion.    Patient will continue  in IOP Phase two and three group.      Safety plan has previously been discussed with patient.     EWA Collier  [unfilled]  07:25 EDT

## 2023-08-21 ENCOUNTER — OFFICE VISIT (OUTPATIENT)
Dept: PSYCHIATRY | Facility: CLINIC | Age: 38
End: 2023-08-21
Payer: MEDICAID

## 2023-08-21 ENCOUNTER — LAB (OUTPATIENT)
Dept: LAB | Facility: HOSPITAL | Age: 38
End: 2023-08-21
Payer: MEDICAID

## 2023-08-21 DIAGNOSIS — F11.20 OPIOID USE DISORDER, SEVERE, DEPENDENCE: ICD-10-CM

## 2023-08-21 DIAGNOSIS — F11.21 OPIOID USE DISORDER, SEVERE, IN EARLY REMISSION: Primary | ICD-10-CM

## 2023-08-21 LAB
ETHANOL BLD-MCNC: <10 MG/DL (ref 0–10)
ETHANOL UR QL: <0.01 %

## 2023-08-21 PROCEDURE — 82077 ASSAY SPEC XCP UR&BREATH IA: CPT

## 2023-08-21 PROCEDURE — 36415 COLL VENOUS BLD VENIPUNCTURE: CPT

## 2023-08-21 PROCEDURE — 80307 DRUG TEST PRSMV CHEM ANLYZR: CPT

## 2023-08-22 NOTE — PROGRESS NOTES
"[facility] PHASE II / Phase III GROUP NOTE    Name: Taye Ahmadi  Date: August 21, 2023    Time in: 3:30  Time out: 4:30  Participants: 6    Data: 1 hour group therapy session (Check in, just for today, relapse justification)     Clinical Maneuvering/Interventions: Therapist utilized a person-centered approach to build rapport with group member. Therapist implemented motivational interviewing techniques to assist client with exploring and resolving ambivalence associated with commitment to change behaviors related to substance use and addiction. Therapist applied cognitive behavioral strategies to facilitate identification of maladaptive patterns of thinking and behavior that contribute to client's risk for continued substance use and relapse. Therapist employed group interaction activities to build rapport among group members, promote sobriety, and emphasize relapse prevention. Therapist promoted safe nonjudgmental environment by providing group members with unconditional positive regard and encouraging group members to comply with group rules and guidelines. Therapist assisted group member with identifying and implementing healthier coping strategies.     Response: Patient attended class in person. Patient participated in completion of check in form. Patient on check in form answered no to question \"currently or since last group meeting, have you had any suicidal thoughts or plan or intent to hurt yourself", and patient also answered no to question of \"currently or since your last group meeting, have you had any homicidal thoughts or plan or intent to hurt others\".  Patient participated fully in group discussion by identifying triggers to what tempts him to use and how it has affected him to in the past and daily life.  Patient identified supports that he utilizes and techniques to assist him in maintaining his recovery.    On a 1:10 Scale (0-none, 10-high):    Anxiety: 2  Depression: 0  Cravings: 0    Assessment "     Diagnoses and all orders for this visit:    1. Opioid use disorder, severe, in early remission (Primary)             Progress toward goal: At goal    Functional Status: Moderate impairment     Prognosis: Good with Ongoing Treatment     Mental Status Exam:   Hygiene:   good  Cooperation:  Cooperative  Eye Contact:  Good  Psychomotor Behavior:  Appropriate  Affect:  Appropriate  Mood: normal  Speech:  Normal  Thought Process:  Linear  Thought Content:  Mood congruent  Suicidal:  None  Homicidal:  None  Hallucinations:  None  Delusion:  None  Memory:  Intact  Orientation:  Person  Reliability:  fair  Insight:  Fair  Judgement:  Fair  Impulse Control:  Fair  Physical/Medical Issues:  No      Assessment:  Engaged in activity/Process and self-disclosed: Yes  Affect:Appropriate   Applies topic to self: Yes  Able to give and receive feedback: Yes    Urinalysis: Negative on prelim results dated 8/21/2023  Labs:   Last Urine Toxicity  More data exists         Latest Ref Rng & Units 6/20/2023 6/13/2023   LAST URINE TOXICITY RESULTS   Amphetamine, Urine Qual Negative Negative  Negative    Barbiturates Screen, Urine Negative Negative  Negative    Benzodiazepine Screen, Urine Negative Negative  Negative    Buprenorphine, Screen, Urine Negative Negative  Negative    Cocaine Screen, Urine Negative Negative  Negative    Methadone Screen , Urine Negative Negative  Negative    Methamphetamine, Ur Negative Negative  Negative         Self-Reported days since last use: 157 days    12-Step attendance: 0 meetings    Plan:   Patient will continue in weekly group psychotherapy sessions and individual outpatient psychotherapy sessions every 3-4 weeks and will continue in self-help meetings and seek additional treatment if necessary following completion.    Patient will continue in IOP Phase two and three group.      Safety plan has previously been discussed with patient.     EWA Collier  [unfilled]  11:21 EDT

## 2023-08-24 ENCOUNTER — OFFICE VISIT (OUTPATIENT)
Dept: PSYCHIATRY | Facility: CLINIC | Age: 38
End: 2023-08-24
Payer: MEDICAID

## 2023-08-24 DIAGNOSIS — F11.21 OPIOID USE DISORDER, SEVERE, IN EARLY REMISSION: Primary | ICD-10-CM

## 2023-08-25 NOTE — PROGRESS NOTES
"[facility] PHASE II / Phase III GROUP NOTE    Name: Taye Ahmadi  Date: August 24, 2023      Time in: 3:30  Time out: 4:30  Participants: 8    Data: 1 hour group therapy session (Check in, just for today, relapse justification)     Clinical Maneuvering/Interventions: Therapist utilized a person-centered approach to build rapport with group member. Therapist implemented motivational interviewing techniques to assist client with exploring and resolving ambivalence associated with commitment to change behaviors related to substance use and addiction. Therapist applied cognitive behavioral strategies to facilitate identification of maladaptive patterns of thinking and behavior that contribute to client's risk for continued substance use and relapse. Therapist employed group interaction activities to build rapport among group members, promote sobriety, and emphasize relapse prevention. Therapist promoted safe nonjudgmental environment by providing group members with unconditional positive regard and encouraging group members to comply with group rules and guidelines. Therapist assisted group member with identifying and implementing healthier coping strategies.     Response: Patient attended class in person. Patient participated in completion of check in form. Patient on check in form answered no to question \"currently or since last group meeting, have you had any suicidal thoughts or plan or intent to hurt yourself", and patient also answered no to question of \"currently or since your last group meeting, have you had any homicidal thoughts or plan or intent to hurt others\".  Patient participated fully in group discussion.  Patient identified techniques that he is using to help other people to help him feel better.  Patient identified some low points in his life and how he overcome them and signs that he is struggling with relapse justification.  Patient identified some support systems that he really leans on and how it " helps him continue to grow in his sobriety    On a 1:10 Scale (0-none, 10-high):    Anxiety: 3  Depression: 1  Cravings: 0    Assessment     Diagnoses and all orders for this visit:    1. Opioid use disorder, severe, in early remission (Primary)             Progress toward goal: At goal    Functional Status: Moderate impairment     Prognosis: Good with Ongoing Treatment     Mental Status Exam:   Hygiene:   good  Cooperation:  Cooperative  Eye Contact:  Good  Psychomotor Behavior:  Appropriate  Affect:  Appropriate  Mood: normal  Speech:  Normal  Thought Process:  Linear  Thought Content:  Mood congruent  Suicidal:  None  Homicidal:  None  Hallucinations:  None  Delusion:  None  Memory:  Intact  Orientation:  Person  Reliability:  fair  Insight:  Fair  Judgement:  Fair  Impulse Control:  Fair  Physical/Medical Issues:  No      Assessment:  Engaged in activity/Process and self-disclosed: Yes  Affect:Appropriate   Applies topic to self: Yes  Able to give and receive feedback: Yes    Urinalysis: Negative confirmed results on 8/21/2023  Labs:   Last Urine Toxicity  More data exists         Latest Ref Rng & Units 6/20/2023 6/13/2023   LAST URINE TOXICITY RESULTS   Amphetamine, Urine Qual Negative Negative  Negative    Barbiturates Screen, Urine Negative Negative  Negative    Benzodiazepine Screen, Urine Negative Negative  Negative    Buprenorphine, Screen, Urine Negative Negative  Negative    Cocaine Screen, Urine Negative Negative  Negative    Methadone Screen , Urine Negative Negative  Negative    Methamphetamine, Ur Negative Negative  Negative         Self-Reported days since last use: 160 days    12-Step attendance: 0 meetings    Plan:   Patient will continue in weekly group psychotherapy sessions and individual outpatient psychotherapy sessions every 3-4 weeks and will continue in self-help meetings and seek additional treatment if necessary following completion.    Patient will continue in IOP Phase two and three  group.      Safety plan has previously been discussed with patient.     EWA Collier  [unfilled]  07:37 EDT

## 2023-08-28 ENCOUNTER — LAB (OUTPATIENT)
Dept: LAB | Facility: HOSPITAL | Age: 38
End: 2023-08-28
Payer: MEDICAID

## 2023-08-28 ENCOUNTER — OFFICE VISIT (OUTPATIENT)
Dept: PSYCHIATRY | Facility: CLINIC | Age: 38
End: 2023-08-28
Payer: MEDICAID

## 2023-08-28 DIAGNOSIS — F11.20 OPIOID USE DISORDER, SEVERE, DEPENDENCE: ICD-10-CM

## 2023-08-28 DIAGNOSIS — F11.21 OPIOID USE DISORDER, SEVERE, IN EARLY REMISSION: Primary | ICD-10-CM

## 2023-08-28 LAB
ETHANOL BLD-MCNC: <10 MG/DL (ref 0–10)
ETHANOL UR QL: <0.01 %

## 2023-08-28 PROCEDURE — 82077 ASSAY SPEC XCP UR&BREATH IA: CPT

## 2023-08-28 PROCEDURE — 80307 DRUG TEST PRSMV CHEM ANLYZR: CPT

## 2023-08-28 PROCEDURE — 36415 COLL VENOUS BLD VENIPUNCTURE: CPT

## 2023-08-29 NOTE — PROGRESS NOTES
"[facility] PHASE II / Phase III GROUP NOTE    Name: Taye Ahmadi  Date: August 28, 2023      Time in: 3:30  Time out: 4:30  Participants: 5    Data: 1 hour group therapy session (Check in, just for today, habit plan and ways people express anger)     Clinical Maneuvering/Interventions: Therapist utilized a person-centered approach to build rapport with group member. Therapist implemented motivational interviewing techniques to assist client with exploring and resolving ambivalence associated with commitment to change behaviors related to substance use and addiction. Therapist applied cognitive behavioral strategies to facilitate identification of maladaptive patterns of thinking and behavior that contribute to client's risk for continued substance use and relapse. Therapist employed group interaction activities to build rapport among group members, promote sobriety, and emphasize relapse prevention. Therapist promoted safe nonjudgmental environment by providing group members with unconditional positive regard and encouraging group members to comply with group rules and guidelines. Therapist assisted group member with identifying and implementing healthier coping strategies.     Response: Patient attended class in person. Patient participated in completion of check in form. Patient on check in form answered no to question \"currently or since last group meeting, have you had any suicidal thoughts or plan or intent to hurt yourself", and patient also answered no to question of \"currently or since your last group meeting, have you had any homicidal thoughts or plan or intent to hurt others\".  Patient participated fully in group discussion by identifying how his anger has been a big factor in his recovery and what he is utilizing to help him overcome his anger and express it in a healthy way around his family and friends.  Patient was able to identify some new habit plan that he wants to continue to build on in his daily " life with exercise and spending time with his children and wife.    On a 1:10 Scale (0-none, 10-high):    Anxiety: 2  Depression: 0  Cravings: 0    Assessment     Diagnoses and all orders for this visit:    1. Opioid use disorder, severe, in early remission (Primary)             Progress toward goal: At goal    Functional Status: Moderate impairment     Prognosis: Good with Ongoing Treatment     Mental Status Exam:   Hygiene:   good  Cooperation:  Cooperative  Eye Contact:  Good  Psychomotor Behavior:  Appropriate  Affect:  Appropriate  Mood: anxious  Speech:  Normal  Thought Process:  Linear  Thought Content:  Mood congruent  Suicidal:  None  Homicidal:  None  Hallucinations:  None  Delusion:  None  Memory:  Intact  Orientation:  Person  Reliability:  fair  Insight:  Fair  Judgement:  Fair  Impulse Control:  Fair  Physical/Medical Issues:  No      Assessment:  Engaged in activity/Process and self-disclosed: Yes  Affect:Appropriate   Applies topic to self: Yes  Able to give and receive feedback: Yes    Urinalysis: Negative on prelim results dated 08/28/2023  Labs:   Last Urine Toxicity  More data exists         Latest Ref Rng & Units 6/20/2023 6/13/2023   LAST URINE TOXICITY RESULTS   Amphetamine, Urine Qual Negative Negative  Negative    Barbiturates Screen, Urine Negative Negative  Negative    Benzodiazepine Screen, Urine Negative Negative  Negative    Buprenorphine, Screen, Urine Negative Negative  Negative    Cocaine Screen, Urine Negative Negative  Negative    Methadone Screen , Urine Negative Negative  Negative    Methamphetamine, Ur Negative Negative  Negative         Self-Reported days since last use: 164 days    12-Step attendance: 0 meetings    Plan:   Patient will continue in weekly group psychotherapy sessions and individual outpatient psychotherapy sessions every 3-4 weeks and will continue in self-help meetings and seek additional treatment if necessary following completion.    Patient will continue in  IOP Phase two and three group.      Safety plan has previously been discussed with patient.     EWA Collier  [unfilled]  16:44 EDT

## 2023-08-31 ENCOUNTER — OFFICE VISIT (OUTPATIENT)
Dept: PSYCHIATRY | Facility: CLINIC | Age: 38
End: 2023-08-31
Payer: MEDICAID

## 2023-08-31 DIAGNOSIS — F11.21 OPIOID USE DISORDER, SEVERE, IN EARLY REMISSION: Primary | ICD-10-CM

## 2023-08-31 PROCEDURE — 90853 GROUP PSYCHOTHERAPY: CPT | Performed by: SOCIAL WORKER

## 2023-09-04 NOTE — PROGRESS NOTES
"[facility] GROUP     Date: 08/31/2023  Name: Taye Ahmadi    Time In: 1530   Time Out: 1630     Number of participants: 9    IOP GROUP NOTE     Data: 1 hour IOP group therapy session (Check-ins, Coping Skills, Relapse Prevention)     Check Ins: Therapist continued facilitation of rapport building strategies between group members. Therapist asked that each patient check in with home life and recovery efforts and identify triggers, cravings, and high risk situations that arise between group sessions. Therapist provided empathy and support during group session.     Session Content/Coping Skills: [employer] student, attended group. Introductions completed. Check ins completed by group members. Clinician educated group on 8/31/2023 being international overdose awareness day. Drug overdose statistics from retrieved from [employer] website reviewed and discussed. Clinician explored stigma related to addiction with group members. Clinician discussed importance of education in reduction of stigma.      Response: Patient attended class in person. Patient participated in completion of check in form. Patient on check in form answered no to question of \"currently or since your last group meeting, have you had any suicidal thoughts or plan or intent to hurt yourself”, and patient also answered no on check in form to question of \"currently or since your last group meeting, have you had any homicidal thoughts or plan or intent to hurt others\".     Personal Assessment 0-10 Scale (0-none, 10-high)    Anxiety:  2   Depression:  0   Cravings: 0     Assessment:     ..  Lab on 08/28/2023   Component Date Value Ref Range Status    Ethanol 08/28/2023 <10  0 - 10 mg/dL Final    Ethanol % 08/28/2023 <0.010  % Final    Amphetamines, IA 08/28/2023 Negative  Cutoff:50 ng/mL Final    Barbiturates, IA 08/28/2023 Negative  Cutoff:0.1 ug/mL Final    Benzodiazepines, IA 08/28/2023 Negative  Cutoff:20 ng/mL Final    COCAINE / METABOLITE, IA 08/28/2023 " Negative  Cutoff:25 ng/mL Final    PHENCYCLIDINE, IA 08/28/2023 Negative  Cutoff:8 ng/mL Final    THC (marijuana) Mtb 08/28/2023 Negative  Cutoff:5 ng/mL Final    OPIATES, IA 08/28/2023 Negative  Cutoff:5 ng/mL Final    OXYCODONES, IA 08/28/2023 Negative  Cutoff:5 ng/mL Final    METHADONE, IA 08/28/2023 Negative  Cutoff:25 ng/mL Final    FENTANYL, IA 08/28/2023 Negative  Cutoff:1.0 ng/mL Final    Buprenorphine, Screen, Urine 08/28/2023 Negative  Cutoff:1.0 ng/mL Final    PROPOXYPHENE, IA 08/28/2023 Negative  Cutoff:50 ng/mL Final    MEPERIDINE, IA 08/28/2023 Negative  Cutoff:100 ng/mL Final    TRAMADOL, IA 08/28/2023 Negative  Cutoff:50 ng/mL Final    Gabapentin, IA 08/28/2023 Negative  Cutoff:1.0 ug/mL Final    Carisoprodol, IA 08/28/2023 Negative  Cutoff:0.5 ug/mL Final    This test was developed and its performance characteristics  determined by [facility].  It has not been cleared or approved  by the Food and Drug Administration.   Lab on 08/21/2023   Component Date Value Ref Range Status    Ethanol 08/21/2023 <10  0 - 10 mg/dL Final    Ethanol % 08/21/2023 <0.010  % Final    Amphetamines, IA 08/21/2023 Negative  Cutoff:50 ng/mL Final    Barbiturates, IA 08/21/2023 Negative  Cutoff:0.1 ug/mL Final    Benzodiazepines, IA 08/21/2023 Negative  Cutoff:20 ng/mL Final    COCAINE / METABOLITE, IA 08/21/2023 Negative  Cutoff:25 ng/mL Final    PHENCYCLIDINE, IA 08/21/2023 Negative  Cutoff:8 ng/mL Final    THC (marijuana) Mtb 08/21/2023 Negative  Cutoff:5 ng/mL Final    OPIATES, IA 08/21/2023 Negative  Cutoff:5 ng/mL Final    OXYCODONES, IA 08/21/2023 Negative  Cutoff:5 ng/mL Final    METHADONE, IA 08/21/2023 Negative  Cutoff:25 ng/mL Final    FENTANYL, IA 08/21/2023 Negative  Cutoff:1.0 ng/mL Final    Buprenorphine, Screen, Urine 08/21/2023 Negative  Cutoff:1.0 ng/mL Final    PROPOXYPHENE, IA 08/21/2023 Negative  Cutoff:50 ng/mL Final    MEPERIDINE, IA 08/21/2023 Negative  Cutoff:100 ng/mL Final    TRAMADOL, IA 08/21/2023  Negative  Cutoff:50 ng/mL Final    Gabapentin, IA 08/21/2023 Negative  Cutoff:1.0 ug/mL Final    Carisoprodol, IA 08/21/2023 Negative  Cutoff:0.5 ug/mL Final    This test was developed and its performance characteristics  determined by [facility].  It has not been cleared or approved  by the Food and Drug Administration.   Lab on 08/14/2023   Component Date Value Ref Range Status    Ethanol 08/14/2023 <10  0 - 10 mg/dL Final    Ethanol % 08/14/2023 <0.010  % Final    Ethanol 08/14/2023 <10  0 - 10 mg/dL Final    Ethanol % 08/14/2023 <0.010  % Final    Amphetamines, IA 08/14/2023 Negative  Cutoff:50 ng/mL Final    Barbiturates, IA 08/14/2023 Negative  Cutoff:0.1 ug/mL Final    Benzodiazepines, IA 08/14/2023 Negative  Cutoff:20 ng/mL Final    COCAINE / METABOLITE, IA 08/14/2023 Negative  Cutoff:25 ng/mL Final    PHENCYCLIDINE, IA 08/14/2023 Negative  Cutoff:8 ng/mL Final    THC (marijuana) Mtb 08/14/2023 Negative  Cutoff:5 ng/mL Final    OPIATES, IA 08/14/2023 Negative  Cutoff:5 ng/mL Final    OXYCODONES, IA 08/14/2023 Negative  Cutoff:5 ng/mL Final    METHADONE, IA 08/14/2023 Negative  Cutoff:25 ng/mL Final    FENTANYL, IA 08/14/2023 Negative  Cutoff:1.0 ng/mL Final    Buprenorphine, Screen, Urine 08/14/2023 Negative  Cutoff:1.0 ng/mL Final    PROPOXYPHENE, IA 08/14/2023 Negative  Cutoff:50 ng/mL Final    MEPERIDINE, IA 08/14/2023 Negative  Cutoff:100 ng/mL Final    TRAMADOL, IA 08/14/2023 Negative  Cutoff:50 ng/mL Final    Gabapentin, IA 08/14/2023 Negative  Cutoff:1.0 ug/mL Final    Carisoprodol, IA 08/14/2023 Negative  Cutoff:0.5 ug/mL Final    This test was developed and its performance characteristics  determined by [facility].  It has not been cleared or approved  by the Food and Drug Administration.       Mental Status Exam  Hygiene:  good  Dress: casual  Attitude: cooperative and agreeable   Motor Activity: appropriate  Eye Contact:  good  Speech: regular rate and rhythm   Mood:  calm and cooperative  Affect:   Appropriate  Thought Processes:  Linear  Thought Content:  Normal  Suicidal Thoughts:  denies  Homicidal Thoughts:  denies  Crisis Safety Plan: Safety plan has been discussed.   Hallucinations:  Unknown to clinician.   Reliability: fair  Insight: fair  Judgement: fair  Impulse Control: fair    Recovery/spiritual support group attendance: No.     Progress toward goal: Not at goal    Prognosis: Fair with Ongoing Treatment     Self-reported number of days sober: Patient reported 167 on check in form.     Patient will contact this office, call 911 or present to the nearest emergency room should suicidal or homicidal ideations occur.    Impression/Formulation:    ICD-10-CM ICD-9-CM   1. Opioid use disorder, severe, in early remission  F11.21 304.03       Clinical Maneuvering/Interventions: Therapist utilized a person-centered approach to build rapport with group member. Therapist implemented motivational interviewing techniques to assist client with exploring and resolving ambivalence associated with commitment to change behaviors related to substance use and addiction. Therapist applied cognitive behavioral strategies to facilitate identification of maladaptive patterns of thinking and behavior that contribute to client's risk for continued substance use and relapse. Therapist employed group interaction activities to build rapport among group members, promote sobriety, and emphasize relapse prevention. Therapist promoted safe nonjudgmental environment by providing group members with unconditional positive regard and encouraging group members to comply with group rules and guidelines. Therapist assisted group member with identifying and implementing healthier coping strategies.      Plan:  Continue [facility] IOP Phase II  Aftercare:  [facility] Phase III  Program Assignments:  Personal recovery plan, relapse prevention plan, attendance of recovery support group  meetings, exploration of sponsorship, drug/alcohol screens.     Ta Ambrose LCSW  9/4/2023  13:14 EDT

## 2023-09-05 ENCOUNTER — LAB (OUTPATIENT)
Dept: LAB | Facility: HOSPITAL | Age: 38
End: 2023-09-05
Payer: MEDICAID

## 2023-09-05 DIAGNOSIS — F11.20 OPIOID USE DISORDER, SEVERE, DEPENDENCE: ICD-10-CM

## 2023-09-05 PROCEDURE — 36415 COLL VENOUS BLD VENIPUNCTURE: CPT

## 2023-09-05 PROCEDURE — 80307 DRUG TEST PRSMV CHEM ANLYZR: CPT

## 2023-09-07 ENCOUNTER — OFFICE VISIT (OUTPATIENT)
Dept: PSYCHIATRY | Facility: CLINIC | Age: 38
End: 2023-09-07
Payer: MEDICAID

## 2023-09-07 DIAGNOSIS — F11.21 OPIOID USE DISORDER, SEVERE, IN EARLY REMISSION: Primary | ICD-10-CM

## 2023-09-08 NOTE — PROGRESS NOTES
"[facility] PHASE II / Phase III GROUP NOTE    Name: Taye Ahmadi  Date: September 7, 2023    Time in:?3:30   Time out:?4:30   Participants: 10     Data:?1?hour group therapy session (Check in, just for today,?external trigger questionair and external trigger chart matrix model)     Clinical Maneuvering/Interventions:?Therapist utilized a person-centered approach to build rapport with group member.?Therapist implemented motivational interviewing techniques to assist client with exploring and resolving ambivalence associated with commitment to change behaviors related to substance use and addiction.?Therapist applied cognitive behavioral strategies to facilitate identification of maladaptive patterns of thinking and behavior that contribute to client's risk for continued substance use and relapse. Therapist employed group interaction activities to build rapport among group members, promote sobriety, and emphasize relapse prevention.?Therapist promoted a safe nonjudgmental environment by providing group members with unconditional positive regard and encouraging group members to comply with group rules and guidelines. Therapist assisted group member with identifying and implementing healthier coping strategies.     Response:?Patient attended class in person. Patient participated in completion of check in form. Patient on check in form answered no to question \"currently or since last group meeting,?have you had any suicidal thoughts or plan or intent to hurt yourself”, and patient also answered no to question of \"currently or since your last group meeting, have you had any homicidal thoughts or plan or intent to hurt others\".  Patient participated in the group discussion and identified his external triggers and how he has worked hard to identify them and alter his life patterns to avoid some of those triggers and help him continue in his sobriety.  Patient was able to provide feedback to other members of the group and " encouragement a supportive environment.     On a 1:10 Scale (0-none, 10-high):    Anxiety: 3  Depression: 1  Cravings: 0    Assessment     Diagnoses and all orders for this visit:    1. Opioid use disorder, severe, in early remission (Primary)             Progress toward goal: At goal    Functional Status: Moderate impairment     Prognosis: Good with Ongoing Treatment     Mental Status Exam:   Hygiene:   good  Cooperation:  Cooperative  Eye Contact:  Good  Psychomotor Behavior:  Appropriate  Affect:  Appropriate  Mood: normal  Speech:  Normal  Thought Process:  Linear  Thought Content:  Mood congruent  Suicidal:  None  Homicidal:  None  Hallucinations:  None  Delusion:  None  Memory:  Intact  Orientation:  Person  Reliability:  fair  Insight:  Fair  Judgement:  Fair  Impulse Control:  Fair  Physical/Medical Issues:  No      Assessment:  Engaged in activity/Process and self-disclosed: Yes  Affect:Appropriate   Applies topic to self: Yes  Able to give and receive feedback: Yes    Urinalysis: Negative  on prelim date 9/5/2023  Labs:   Last Urine Toxicity  More data exists         Latest Ref Rng & Units 6/20/2023 6/13/2023   LAST URINE TOXICITY RESULTS   Amphetamine, Urine Qual Negative Negative  Negative    Barbiturates Screen, Urine Negative Negative  Negative    Benzodiazepine Screen, Urine Negative Negative  Negative    Buprenorphine, Screen, Urine Negative Negative  Negative    Cocaine Screen, Urine Negative Negative  Negative    Methadone Screen , Urine Negative Negative  Negative    Methamphetamine, Ur Negative Negative  Negative         Self-Reported days since last use: 174 days    12-Step attendance: 0 meetings    Plan:   Patient will continue in weekly group psychotherapy sessions and individual outpatient psychotherapy sessions every 3-4 weeks and will continue in self-help meetings and seek additional treatment if necessary following completion.    Patient will continue in IOP Phase two and three group.       Safety plan has previously been discussed with patient.     EWA Collier  [unfilled]  07:19 EDT

## 2023-09-11 ENCOUNTER — LAB (OUTPATIENT)
Dept: LAB | Facility: HOSPITAL | Age: 38
End: 2023-09-11
Payer: MEDICAID

## 2023-09-11 ENCOUNTER — OFFICE VISIT (OUTPATIENT)
Dept: PSYCHIATRY | Facility: CLINIC | Age: 38
End: 2023-09-11
Payer: MEDICAID

## 2023-09-11 DIAGNOSIS — F11.21 OPIOID USE DISORDER, SEVERE, IN EARLY REMISSION: Primary | ICD-10-CM

## 2023-09-11 DIAGNOSIS — F11.20 OPIOID USE DISORDER, SEVERE, DEPENDENCE: ICD-10-CM

## 2023-09-11 LAB
ETHANOL BLD-MCNC: <10 MG/DL (ref 0–10)
ETHANOL UR QL: <0.01 %

## 2023-09-11 PROCEDURE — 80307 DRUG TEST PRSMV CHEM ANLYZR: CPT

## 2023-09-11 PROCEDURE — 36415 COLL VENOUS BLD VENIPUNCTURE: CPT

## 2023-09-11 PROCEDURE — 82077 ASSAY SPEC XCP UR&BREATH IA: CPT

## 2023-09-12 NOTE — PROGRESS NOTES
"[facility] PHASE II / Phase III GROUP NOTE    Name: Taye Ahmadi  Date: September 11, 2023    Time in:?3:30   Time out:?4:30   Participants: 7     Data:?1?hour group therapy session (Check in and internal trigger questionnaire and internal trigger chart matrix model)     Clinical Maneuvering/Interventions:?Therapist utilized a person-centered approach to build rapport with group member.?Therapist implemented motivational interviewing techniques to assist client with exploring and resolving ambivalence associated with commitment to change behaviors related to substance use and addiction.?Therapist applied cognitive behavioral strategies to facilitate identification of maladaptive patterns of thinking and behavior that contribute to client's risk for continued substance use and relapse. Therapist employed group interaction activities to build rapport among group members, promote sobriety, and emphasize relapse prevention.?Therapist promoted a safe nonjudgmental environment by providing group members with unconditional positive regard and encouraging group members to comply with group rules and guidelines. Therapist assisted group member with identifying and implementing healthier coping strategies.     Response:?Patient attended class in person. Patient participated in completion of check in form. Patient on check in form answered no to question \"currently or since last group meeting,?have you had any suicidal thoughts or plan or intent to hurt yourself”, and patient also answered no to question of \"currently or since your last group meeting, have you had any homicidal thoughts or plan or intent to hurt others\".  Patient was able to identify internal triggers that he has experienced in his recovery and identified some techniques to help him when he is experiencing those triggers.  Patient was able to provide positive feedback and tips to other members of the group during the discussion of triggers and emotions.    On a " 1:10 Scale (0-none, 10-high):    Anxiety: 3  Depression: 0  Cravings: 0    Assessment     Diagnoses and all orders for this visit:    1. Opioid use disorder, severe, in early remission (Primary)             Progress toward goal: At goal    Functional Status: Moderate impairment     Prognosis: Good with Ongoing Treatment     Mental Status Exam:   Hygiene:   good  Cooperation:  Cooperative  Eye Contact:  Good  Psychomotor Behavior:  Appropriate  Affect:  Appropriate  Mood: normal  Speech:  Normal  Thought Process:  Linear  Thought Content:  Mood congruent  Suicidal:  None  Homicidal:  None  Hallucinations:  None  Delusion:  None  Memory:  Intact  Orientation:  Person  Reliability:  fair  Insight:  Fair  Judgement:  Fair  Impulse Control:  Fair  Physical/Medical Issues:  No      Assessment:  Engaged in activity/Process and self-disclosed: Yes  Affect:Appropriate   Applies topic to self: Yes  Able to give and receive feedback: Yes    Urinalysis: Negative  on prelim results dated 9/11/2023  Labs:   Last Urine Toxicity  More data exists         Latest Ref Rng & Units 6/20/2023 6/13/2023   LAST URINE TOXICITY RESULTS   Amphetamine, Urine Qual Negative Negative  Negative    Barbiturates Screen, Urine Negative Negative  Negative    Benzodiazepine Screen, Urine Negative Negative  Negative    Buprenorphine, Screen, Urine Negative Negative  Negative    Cocaine Screen, Urine Negative Negative  Negative    Methadone Screen , Urine Negative Negative  Negative    Methamphetamine, Ur Negative Negative  Negative         Self-Reported days since last use: 178 days    12-Step attendance: 0 meetings    Plan:   Patient will continue in weekly group psychotherapy sessions and individual outpatient psychotherapy sessions every 3-4 weeks and will continue in self-help meetings and seek additional treatment if necessary following completion.    Patient will continue in IOP Phase two and three group.      Safety plan has previously been  discussed with patient.     EWA Collier  [unfilled]  07:52 EDT

## 2023-09-14 ENCOUNTER — OFFICE VISIT (OUTPATIENT)
Dept: PSYCHIATRY | Facility: CLINIC | Age: 38
End: 2023-09-14
Payer: MEDICAID

## 2023-09-14 DIAGNOSIS — F17.210 CIGARETTE NICOTINE DEPENDENCE WITHOUT COMPLICATION: ICD-10-CM

## 2023-09-14 DIAGNOSIS — F11.21 OPIOID USE DISORDER, SEVERE, IN EARLY REMISSION: Primary | ICD-10-CM

## 2023-09-15 NOTE — PROGRESS NOTES
"[facility] PHASE II / Phase III GROUP NOTE    Name: Taye Ahmadi  Date: September 14, 2023    Time in:?3:30   Time out:?4:30   Participants: 9     Data:?1?hour group therapy session (Check in and internal trigger questionnaire and internal trigger chart matrix model)     Clinical Maneuvering/Interventions:?Therapist utilized a person-centered approach to build rapport with group member.?Therapist implemented motivational interviewing techniques to assist client with exploring and resolving ambivalence associated with commitment to change behaviors related to substance use and addiction.?Therapist applied cognitive behavioral strategies to facilitate identification of maladaptive patterns of thinking and behavior that contribute to client's risk for continued substance use and relapse. Therapist employed group interaction activities to build rapport among group members, promote sobriety, and emphasize relapse prevention.?Therapist promoted a safe nonjudgmental environment by providing group members with unconditional positive regard and encouraging group members to comply with group rules and guidelines. Therapist assisted group member with identifying and implementing healthier coping strategies.     Response:?Patient attended class in person. Patient participated in completion of check in form. Patient on check in form answered no to question \"currently or since last group meeting,?have you had any suicidal thoughts or plan or intent to hurt yourself”, and patient also answered no to question of \"currently or since your last group meeting, have you had any homicidal thoughts or plan or intent to hurt others\".  Patient participated fully in group discussion and identified some supports that he has and how they help him with his internal triggers.  Patient expressed a time that he was faced with his internal trigger and overcame that through the practice and supports that he has.  Patient was provided helpful " information and positive feedback to other members of the group.    On a 1:10 Scale (0-none, 10-high):    Anxiety: 2  Depression: 0  Cravings: 0    Assessment     Diagnoses and all orders for this visit:    1. Opioid use disorder, severe, in early remission (Primary)    2. Cigarette nicotine dependence without complication             Progress toward goal: At goal    Functional Status: Moderate impairment     Prognosis: Good with Ongoing Treatment     Mental Status Exam:   Hygiene:   good  Cooperation:  Cooperative  Eye Contact:  Good  Psychomotor Behavior:  Appropriate  Affect:  Appropriate  Mood: normal  Speech:  Normal  Thought Process:  Linear  Thought Content:  Mood congruent  Suicidal:  None  Homicidal:  None  Hallucinations:  None  Delusion:  None  Memory:  Intact  Orientation:  Person  Reliability:  fair  Insight:  Fair  Judgement:  Fair  Impulse Control:  Fair  Physical/Medical Issues:  No      Assessment:  Engaged in activity/Process and self-disclosed: Yes  Affect:Appropriate   Applies topic to self: Yes  Able to give and receive feedback: Yes    Urinalysis: Negative on prelim results dated 9/11/2023  Labs:   Last Urine Toxicity  More data exists         Latest Ref Rng & Units 6/20/2023 6/13/2023   LAST URINE TOXICITY RESULTS   Amphetamine, Urine Qual Negative Negative  Negative    Barbiturates Screen, Urine Negative Negative  Negative    Benzodiazepine Screen, Urine Negative Negative  Negative    Buprenorphine, Screen, Urine Negative Negative  Negative    Cocaine Screen, Urine Negative Negative  Negative    Methadone Screen , Urine Negative Negative  Negative    Methamphetamine, Ur Negative Negative  Negative         Self-Reported days since last use: 181 days    12-Step attendance: 0 meetings    Plan:   Patient will continue in weekly group psychotherapy sessions and individual outpatient psychotherapy sessions every 3-4 weeks and will continue in self-help meetings and seek additional treatment if  necessary following completion.    Patient will continue in IOP Phase two and three group.      Safety plan has previously been discussed with patient.     EWA Collier  [unfilled]  07:31 EDT

## 2023-09-18 ENCOUNTER — LAB (OUTPATIENT)
Dept: LAB | Facility: HOSPITAL | Age: 38
End: 2023-09-18
Payer: MEDICAID

## 2023-09-18 ENCOUNTER — OFFICE VISIT (OUTPATIENT)
Dept: PSYCHIATRY | Facility: CLINIC | Age: 38
End: 2023-09-18
Payer: MEDICAID

## 2023-09-18 DIAGNOSIS — F17.210 CIGARETTE NICOTINE DEPENDENCE WITHOUT COMPLICATION: ICD-10-CM

## 2023-09-18 DIAGNOSIS — F11.20 OPIOID USE DISORDER, SEVERE, DEPENDENCE: ICD-10-CM

## 2023-09-18 DIAGNOSIS — F11.21 OPIOID USE DISORDER, SEVERE, IN EARLY REMISSION: Primary | ICD-10-CM

## 2023-09-18 LAB
ETHANOL BLD-MCNC: <10 MG/DL (ref 0–10)
ETHANOL UR QL: <0.01 %

## 2023-09-18 PROCEDURE — 36415 COLL VENOUS BLD VENIPUNCTURE: CPT

## 2023-09-18 PROCEDURE — 82077 ASSAY SPEC XCP UR&BREATH IA: CPT

## 2023-09-18 PROCEDURE — 80307 DRUG TEST PRSMV CHEM ANLYZR: CPT

## 2023-09-19 NOTE — PROGRESS NOTES
"[facility] PHASE II / Phase III GROUP NOTE    Name: Taye Ahmadi  Date: September 18, 2023    Time in:?3:30   Time out:?4:30   Participants: 9     Data:?1?hour group therapy session (Check in and anger attitude check, and relapse justification)     Clinical Maneuvering/Interventions:?Therapist utilized a person-centered approach to build rapport with group member.?Therapist implemented motivational interviewing techniques to assist client with exploring and resolving ambivalence associated with commitment to change behaviors related to substance use and addiction.?Therapist applied cognitive behavioral strategies to facilitate identification of maladaptive patterns of thinking and behavior that contribute to client's risk for continued substance use and relapse. Therapist employed group interaction activities to build rapport among group members, promote sobriety, and emphasize relapse prevention.?Therapist promoted a safe nonjudgmental environment by providing group members with unconditional positive regard and encouraging group members to comply with group rules and guidelines. Therapist assisted group member with identifying and implementing healthier coping strategies.     Response:?Patient attended class in person. Patient participated in completion of check in form. Patient on check in form answered no to question \"currently or since last group meeting,?have you had any suicidal thoughts or plan or intent to hurt yourself”, and patient also answered no to question of \"currently or since your last group meeting, have you had any homicidal thoughts or plan or intent to hurt others\".  Patient participated fully in group discussion.  Patient identified some techniques that he uses when he is feeling anger.  Patient identified some positive feedback to other members and words of encouragement to assist others and feeling more confident in themselves.     On a 1:10 Scale (0-none, 10-high):    Anxiety: 2  Depression: " 0  Cravings: 0    Assessment     Diagnoses and all orders for this visit:    1. Opioid use disorder, severe, in early remission (Primary)    2. Cigarette nicotine dependence without complication             Progress toward goal: At goal    Functional Status: Moderate impairment     Prognosis: Good with Ongoing Treatment     Mental Status Exam:   Hygiene:   good  Cooperation:  Cooperative  Eye Contact:  Good  Psychomotor Behavior:  Appropriate  Affect:  Appropriate  Mood: normal  Speech:  Normal  Thought Process:  Linear  Thought Content:  Mood congruent  Suicidal:  None  Homicidal:  None  Hallucinations:  None  Delusion:  None  Memory:  Intact  Orientation:  Person  Reliability:  fair  Insight:  Fair  Judgement:  Fair  Impulse Control:  Fair  Physical/Medical Issues:  No      Assessment:  Engaged in activity/Process and self-disclosed: Yes  Affect:Appropriate   Applies topic to self: Yes  Able to give and receive feedback: Yes    Urinalysis: Negative on prelim results dated 9/19/2023  Labs:   Last Urine Toxicity  More data exists         Latest Ref Rng & Units 6/20/2023 6/13/2023   LAST URINE TOXICITY RESULTS   Amphetamine, Urine Qual Negative Negative  Negative    Barbiturates Screen, Urine Negative Negative  Negative    Benzodiazepine Screen, Urine Negative Negative  Negative    Buprenorphine, Screen, Urine Negative Negative  Negative    Cocaine Screen, Urine Negative Negative  Negative    Methadone Screen , Urine Negative Negative  Negative    Methamphetamine, Ur Negative Negative  Negative         Self-Reported days since last use: 185 day    12-Step attendance: 0 meetings    Plan:   Patient will continue in weekly group psychotherapy sessions and individual outpatient psychotherapy sessions every 3-4 weeks and will continue in self-help meetings and seek additional treatment if necessary following completion.    Patient will continue in IOP Phase two and three group.      Safety plan has previously been  discussed with patient.     EWA Collier  [unfilled]  08:53 EDT

## 2023-09-21 ENCOUNTER — OFFICE VISIT (OUTPATIENT)
Dept: PSYCHIATRY | Facility: CLINIC | Age: 38
End: 2023-09-21
Payer: MEDICAID

## 2023-09-21 DIAGNOSIS — F17.210 CIGARETTE NICOTINE DEPENDENCE WITHOUT COMPLICATION: ICD-10-CM

## 2023-09-21 DIAGNOSIS — F11.21 OPIOID USE DISORDER, SEVERE, IN EARLY REMISSION: Primary | ICD-10-CM

## 2023-09-21 NOTE — PROGRESS NOTES
"[facility] PHASE II / Phase III GROUP NOTE    Name: Taye Ahmadi  Date: September 21, 2023    Time in:?3:30   Time out:?4:30   Participants: 10     Data:?1?hour group therapy session (Check in and coping skills to assist with relapse prevention)     Clinical Maneuvering/Interventions:?Therapist utilized a person-centered approach to build rapport with group member.?Therapist implemented motivational interviewing techniques to assist client with exploring and resolving ambivalence associated with commitment to change behaviors related to substance use and addiction.?Therapist applied cognitive behavioral strategies to facilitate identification of maladaptive patterns of thinking and behavior that contribute to client's risk for continued substance use and relapse. Therapist employed group interaction activities to build rapport among group members, promote sobriety, and emphasize relapse prevention.?Therapist promoted a safe nonjudgmental environment by providing group members with unconditional positive regard and encouraging group members to comply with group rules and guidelines. Therapist assisted group member with identifying and implementing healthier coping strategies.     Response:?Patient attended class in person. Patient participated in completion of check in form. Patient on check in form answered no to question \"currently or since last group meeting,?have you had any suicidal thoughts or plan or intent to hurt yourself”, and patient also answered no to question of \"currently or since your last group meeting, have you had any homicidal thoughts or plan or intent to hurt others\".  Patient participated fully in group discussion by providing positive feedback to other members.  Patient participated in the group activity to practice the new coping skills to help him when he is struggling with the big emotion.  Patient identified some resources to help other members and some improvement with his job perspective. "  Patient identified that his job is very helpful and that the opportunities that he is applied for if he does not get them that he feels that it could be a good sign to help him with his recovery by continuing to work where he is at.    On a 1:10 Scale (0-none, 10-high):    Anxiety: 3  Depression: 0  Cravings: 0    Assessment     Diagnoses and all orders for this visit:    1. Opioid use disorder, severe, in early remission (Primary)    2. Cigarette nicotine dependence without complication             Progress toward goal: At goal    Functional Status: Moderate impairment     Prognosis: Good with Ongoing Treatment     Mental Status Exam:   Hygiene:   good  Cooperation:  Cooperative  Eye Contact:  Good  Psychomotor Behavior:  Appropriate  Affect:  Appropriate  Mood: normal  Speech:  Normal  Thought Process:  Linear  Thought Content:  Mood congruent  Suicidal:  None  Homicidal:  None  Hallucinations:  None  Delusion:  None  Memory:  Intact  Orientation:  Person  Reliability:  fair  Insight:  Fair  Judgement:  Fair  Impulse Control:  Fair  Physical/Medical Issues:  No      Assessment:  Engaged in activity/Process and self-disclosed: Yes  Affect:Appropriate   Applies topic to self: Yes  Able to give and receive feedback: Yes    Urinalysis: Negative confirmed on 9/18/2023  Labs:   Last Urine Toxicity  More data exists         Latest Ref Rng & Units 6/20/2023 6/13/2023   LAST URINE TOXICITY RESULTS   Amphetamine, Urine Qual Negative Negative  Negative    Barbiturates Screen, Urine Negative Negative  Negative    Benzodiazepine Screen, Urine Negative Negative  Negative    Buprenorphine, Screen, Urine Negative Negative  Negative    Cocaine Screen, Urine Negative Negative  Negative    Methadone Screen , Urine Negative Negative  Negative    Methamphetamine, Ur Negative Negative  Negative         Self-Reported days since last use: 188 days    12-Step attendance: 0 meetings    Plan:   Patient will continue in weekly group  psychotherapy sessions and individual outpatient psychotherapy sessions every 3-4 weeks and will continue in self-help meetings and seek additional treatment if necessary following completion.    Patient will continue in IOP Phase two and three group.      Safety plan has previously been discussed with patient.     EWA Collier  [unfilled]  18:09 EDT

## 2023-09-25 ENCOUNTER — LAB (OUTPATIENT)
Dept: LAB | Facility: HOSPITAL | Age: 38
End: 2023-09-25
Payer: MEDICAID

## 2023-09-25 ENCOUNTER — OFFICE VISIT (OUTPATIENT)
Dept: PSYCHIATRY | Facility: CLINIC | Age: 38
End: 2023-09-25
Payer: MEDICAID

## 2023-09-25 DIAGNOSIS — F11.20 OPIOID USE DISORDER, SEVERE, DEPENDENCE: ICD-10-CM

## 2023-09-25 DIAGNOSIS — F17.210 CIGARETTE NICOTINE DEPENDENCE WITHOUT COMPLICATION: ICD-10-CM

## 2023-09-25 DIAGNOSIS — F11.21 OPIOID USE DISORDER, SEVERE, IN EARLY REMISSION: Primary | ICD-10-CM

## 2023-09-25 LAB
ETHANOL BLD-MCNC: <10 MG/DL (ref 0–10)
ETHANOL UR QL: <0.01 %

## 2023-09-25 PROCEDURE — 80307 DRUG TEST PRSMV CHEM ANLYZR: CPT

## 2023-09-25 PROCEDURE — 82077 ASSAY SPEC XCP UR&BREATH IA: CPT

## 2023-09-25 PROCEDURE — 36415 COLL VENOUS BLD VENIPUNCTURE: CPT

## 2023-09-26 NOTE — PROGRESS NOTES
"[facility] PHASE II / Phase III GROUP NOTE    Name: Taye Ahmadi  Date: September 25, 2023    Time in:?3:30   Time out:?4:30   Participants: 9     Data:?1?hour group therapy session (Check in just for today, positive thinking activity, and coping skills)     Clinical Maneuvering/Interventions:?Therapist utilized a person-centered approach to build rapport with group member.?Therapist implemented motivational interviewing techniques to assist client with exploring and resolving ambivalence associated with commitment to change behaviors related to substance use and addiction.?Therapist applied cognitive behavioral strategies to facilitate identification of maladaptive patterns of thinking and behavior that contribute to client's risk for continued substance use and relapse. Therapist employed group interaction activities to build rapport among group members, promote sobriety, and emphasize relapse prevention.?Therapist promoted a safe nonjudgmental environment by providing group members with unconditional positive regard and encouraging group members to comply with group rules and guidelines. Therapist assisted group member with identifying and implementing healthier coping strategies.     Response:?Patient attended class in person. Patient participated in completion of check in form. Patient on check in form answered no to question \"currently or since last group meeting,?have you had any suicidal thoughts or plan or intent to hurt yourself”, and patient also answered no to question of \"currently or since your last group meeting, have you had any homicidal thoughts or plan or intent to hurt others\".  Patient participated fully in group activity identifying his support systems that he utilizes when he struggles with cravings.  Patient expressed positive feedback to other members and was very respectful to what they had to say.  Patient identified some coping skills that he utilizes when he is struggling with big " emotions that could lead into relapse.         On a 1:10 Scale (0-none, 10-high):    Anxiety: 3  Depression: 0  Cravings: 0    Assessment     Diagnoses and all orders for this visit:    1. Opioid use disorder, severe, in early remission (Primary)    2. Cigarette nicotine dependence without complication             Progress toward goal: At goal    Functional Status: Moderate impairment     Prognosis: Good with Ongoing Treatment     Mental Status Exam:   Hygiene:   good  Cooperation:  Cooperative  Eye Contact:  Good  Psychomotor Behavior:  Appropriate  Affect:  Appropriate  Mood: anxious  Speech:  Normal  Thought Process:  Linear  Thought Content:  Mood congruent  Suicidal:  None  Homicidal:  None  Hallucinations:  None  Delusion:  None  Memory:  Intact  Orientation:  Person  Reliability:  fair  Insight:  Fair  Judgement:  Fair  Impulse Control:  Fair  Physical/Medical Issues:  No      Assessment:  Engaged in activity/Process and self-disclosed: Yes  Affect:Appropriate   Applies topic to self: Yes  Able to give and receive feedback: Yes    Urinalysis: Negative on prelim's dated 9/25/2023  Labs:   Last Urine Toxicity  More data exists         Latest Ref Rng & Units 6/20/2023 6/13/2023   LAST URINE TOXICITY RESULTS   Amphetamine, Urine Qual Negative Negative  Negative    Barbiturates Screen, Urine Negative Negative  Negative    Benzodiazepine Screen, Urine Negative Negative  Negative    Buprenorphine, Screen, Urine Negative Negative  Negative    Cocaine Screen, Urine Negative Negative  Negative    Methadone Screen , Urine Negative Negative  Negative    Methamphetamine, Ur Negative Negative  Negative         Self-Reported days since last use: 192 days    12-Step attendance: 0 meeting    Plan:   Patient will continue in weekly group psychotherapy sessions and individual outpatient psychotherapy sessions every 3-4 weeks and will continue in self-help meetings and seek additional treatment if necessary following  completion.    Patient will continue in IOP Phase two and three group.      Safety plan has previously been discussed with patient.     EWA Collier  [unfilled]  07:40 EDT

## 2023-09-28 ENCOUNTER — OFFICE VISIT (OUTPATIENT)
Dept: PSYCHIATRY | Facility: CLINIC | Age: 38
End: 2023-09-28
Payer: MEDICAID

## 2023-09-28 DIAGNOSIS — F11.21 OPIOID USE DISORDER, SEVERE, IN EARLY REMISSION: Primary | ICD-10-CM

## 2023-09-29 NOTE — PROGRESS NOTES
"[facility] PHASE II / Phase III GROUP NOTE    Name: Taye Ahmadi  Date: September 28, 2023    Time in:?3:30   Time out:?4:30   Participants: 9     Data:?1?hour group therapy session (Check in just for today, relapse justification, anger, and attitudes)     Clinical Maneuvering/Interventions:?Therapist utilized a person-centered approach to build rapport with group member.?Therapist implemented motivational interviewing techniques to assist client with exploring and resolving ambivalence associated with commitment to change behaviors related to substance use and addiction.?Therapist applied cognitive behavioral strategies to facilitate identification of maladaptive patterns of thinking and behavior that contribute to client's risk for continued substance use and relapse. Therapist employed group interaction activities to build rapport among group members, promote sobriety, and emphasize relapse prevention.?Therapist promoted a safe nonjudgmental environment by providing group members with unconditional positive regard and encouraging group members to comply with group rules and guidelines. Therapist assisted group member with identifying and implementing healthier coping strategies.     Response:?Patient attended class in person. Patient participated in completion of check in form. Patient on check in form answered no to question \"currently or since last group meeting,?have you had any suicidal thoughts or plan or intent to hurt yourself”, and patient also answered no to question of \"currently or since your last group meeting, have you had any homicidal thoughts or plan or intent to hurt others\".  Patient participated fully in group discussion.  Patient expressed some struggles that he has been experiencing and adjusting to some changes in his life.  Patient expressed that he has been leaning on his supports to help him through these times and feels that he is finding the changes to be more sustainable for him.  " Patient expressed that he feels more happy here and spend more time with his family.  Patient reported that he finds that to be a big piece of his success in recovery.  Patient was able to identify some negative thoughts that he is struggled with and challenged them with some positive thoughts dealing with anger and his attitude.  Patient was able to provide helpful feedback and resources to other members of the group as well.    On a 1:10 Scale (0-none, 10-high):    Anxiety: 2  Depression: 0  Cravings: 0    Assessment     Diagnoses and all orders for this visit:    1. Opioid use disorder, severe, in early remission (Primary)             Progress toward goal: At goal    Functional Status: Moderate impairment     Prognosis: Good with Ongoing Treatment     Mental Status Exam:   Hygiene:   good  Cooperation:  Cooperative  Eye Contact:  Good  Psychomotor Behavior:  Appropriate  Affect:  Appropriate  Mood: normal  Speech:  Normal  Thought Process:  Linear  Thought Content:  Mood congruent  Suicidal:  None  Homicidal:  None  Hallucinations:  None  Delusion:  None  Memory:  Intact  Orientation:  Person  Reliability:  fair  Insight:  Fair  Judgement:  Fair  Impulse Control:  Fair  Physical/Medical Issues:  No      Assessment:  Engaged in activity/Process and self-disclosed: Yes  Affect:Appropriate   Applies topic to self: Yes  Able to give and receive feedback: Yes    Urinalysis: Negative on prelim results dated 9/25/2023  Labs:   Last Urine Toxicity  More data exists         Latest Ref Rng & Units 6/20/2023 6/13/2023   LAST URINE TOXICITY RESULTS   Amphetamine, Urine Qual Negative Negative  Negative    Barbiturates Screen, Urine Negative Negative  Negative    Benzodiazepine Screen, Urine Negative Negative  Negative    Buprenorphine, Screen, Urine Negative Negative  Negative    Cocaine Screen, Urine Negative Negative  Negative    Methadone Screen , Urine Negative Negative  Negative    Methamphetamine, Ur Negative Negative   Negative         Self-Reported days since last use: 195 days    12-Step attendance: 0 meetings    Plan:   Patient will continue in weekly group psychotherapy sessions and individual outpatient psychotherapy sessions every 3-4 weeks and will continue in self-help meetings and seek additional treatment if necessary following completion.    Patient will continue in IOP Phase two and three group.      Safety plan has previously been discussed with patient.     EWA Collier  [unfilled]  08:21 EDT

## 2023-10-02 ENCOUNTER — LAB (OUTPATIENT)
Dept: LAB | Facility: HOSPITAL | Age: 38
End: 2023-10-02
Payer: MEDICAID

## 2023-10-02 ENCOUNTER — OFFICE VISIT (OUTPATIENT)
Dept: PSYCHIATRY | Facility: CLINIC | Age: 38
End: 2023-10-02
Payer: MEDICAID

## 2023-10-02 DIAGNOSIS — F11.21 OPIOID USE DISORDER, SEVERE, IN EARLY REMISSION: Primary | ICD-10-CM

## 2023-10-02 DIAGNOSIS — F17.210 CIGARETTE NICOTINE DEPENDENCE WITHOUT COMPLICATION: ICD-10-CM

## 2023-10-02 DIAGNOSIS — F11.20 OPIOID USE DISORDER, SEVERE, DEPENDENCE: ICD-10-CM

## 2023-10-02 LAB
ETHANOL BLD-MCNC: <10 MG/DL (ref 0–10)
ETHANOL UR QL: <0.01 %

## 2023-10-02 PROCEDURE — 36415 COLL VENOUS BLD VENIPUNCTURE: CPT

## 2023-10-02 PROCEDURE — 82077 ASSAY SPEC XCP UR&BREATH IA: CPT

## 2023-10-02 PROCEDURE — 80307 DRUG TEST PRSMV CHEM ANLYZR: CPT

## 2023-10-03 NOTE — PROGRESS NOTES
"[facility] PHASE II / Phase III GROUP NOTE    Name: Taye Amhadi  Date: October 2, 2023    Time in:?4:30   Time out:?5:30   Participants: 9     Data:?1?hour group therapy session (Check in, relapse justification and goal setting)     Clinical Maneuvering/Interventions:?Therapist utilized a person-centered approach to build rapport with group member.?Therapist implemented motivational interviewing techniques to assist client with exploring and resolving ambivalence associated with commitment to change behaviors related to substance use and addiction.?Therapist applied cognitive behavioral strategies to facilitate identification of maladaptive patterns of thinking and behavior that contribute to client's risk for continued substance use and relapse. Therapist employed group interaction activities to build rapport among group members, promote sobriety, and emphasize relapse prevention.?Therapist promoted a safe nonjudgmental environment by providing group members with unconditional positive regard and encouraging group members to comply with group rules and guidelines. Therapist assisted group member with identifying and implementing healthier coping strategies.     Response:?Patient attended class in person. Patient participated in completion of check in form. Patient on check in form answered no to question \"currently or since last group meeting,?have you had any suicidal thoughts or plan or intent to hurt yourself”, and patient also answered no to question of \"currently or since your last group meeting, have you had any homicidal thoughts or plan or intent to hurt others\".  Patient participated fully in group discussion.  Patient identified some goals that he started with in the beginning and how they have changed to more to a direct approach in his recovery.  Patient identified some support systems that he utilizes when he is struggling with relapse justification.  The patient identified some helpful coping skills " that he has found beneficial.  Patient was able to provide helpful feedback and words of encouragement other members of the group    On a 1:10 Scale (0-none, 10-high):    Anxiety: 3  Depression: 0  Cravings: 0    Assessment     Diagnoses and all orders for this visit:    1. Opioid use disorder, severe, in early remission (Primary)    2. Cigarette nicotine dependence without complication             Progress toward goal: At goal    Functional Status: Moderate impairment     Prognosis: Good with Ongoing Treatment     Mental Status Exam:   Hygiene:   good  Cooperation:  Cooperative  Eye Contact:  Good  Psychomotor Behavior:  Appropriate  Affect:  Appropriate  Mood: normal  Speech:  Normal  Thought Process:  Linear  Thought Content:  Mood congruent  Suicidal:  None  Homicidal:  None  Hallucinations:  None  Delusion:  None  Memory:  Intact  Orientation:  Person  Reliability:  fair  Insight:  Fair  Judgement:  Fair  Impulse Control:  Fair  Physical/Medical Issues:  No      Assessment:  Engaged in activity/Process and self-disclosed: Yes  Affect:Appropriate   Applies topic to self: Yes  Able to give and receive feedback: Yes    Urinalysis: Negative on prelim results dated 10/2/2023  Labs:   Last Urine Toxicity  More data exists         Latest Ref Rng & Units 6/20/2023 6/13/2023   LAST URINE TOXICITY RESULTS   Amphetamine, Urine Qual Negative Negative  Negative    Barbiturates Screen, Urine Negative Negative  Negative    Benzodiazepine Screen, Urine Negative Negative  Negative    Buprenorphine, Screen, Urine Negative Negative  Negative    Cocaine Screen, Urine Negative Negative  Negative    Methadone Screen , Urine Negative Negative  Negative    Methamphetamine, Ur Negative Negative  Negative         Self-Reported days since last use: 199 days    12-Step attendance: 0 meetings    Plan:   Patient will continue in weekly group psychotherapy sessions and individual outpatient psychotherapy sessions every 3-4 weeks and will  continue in self-help meetings and seek additional treatment if necessary following completion.    Patient will continue in IOP Phase two and three group.      Safety plan has previously been discussed with patient.     EWA Collier  [unfilled]  08:00 EDT

## 2023-10-05 ENCOUNTER — OFFICE VISIT (OUTPATIENT)
Dept: PSYCHIATRY | Facility: CLINIC | Age: 38
End: 2023-10-05
Payer: MEDICAID

## 2023-10-05 DIAGNOSIS — F11.21 OPIOID USE DISORDER, SEVERE, IN EARLY REMISSION: Primary | ICD-10-CM

## 2023-10-05 DIAGNOSIS — F17.210 CIGARETTE NICOTINE DEPENDENCE WITHOUT COMPLICATION: ICD-10-CM

## 2023-10-05 NOTE — PROGRESS NOTES
"IOP PHASE II / Phase III GROUP NOTE    Name: Taye Ahmadi  Date: October 5, 2023    Time in:?4:30   Time out:?5:30   Participants: 9     Data:?1?hour group therapy session (Check in, AA daily reflection and goal setting)     Clinical Maneuvering/Interventions:?Therapist utilized a person-centered approach to build rapport with group member.?Therapist implemented motivational interviewing techniques to assist client with exploring and resolving ambivalence associated with commitment to change behaviors related to substance use and addiction.?Therapist applied cognitive behavioral strategies to facilitate identification of maladaptive patterns of thinking and behavior that contribute to client's risk for continued substance use and relapse. Therapist employed group interaction activities to build rapport among group members, promote sobriety, and emphasize relapse prevention.?Therapist promoted a safe nonjudgmental environment by providing group members with unconditional positive regard and encouraging group members to comply with group rules and guidelines. Therapist assisted group member with identifying and implementing healthier coping strategies.      Response:?Patient attended class in person. Patient participated in completion of check in form. Patient on check in form answered no to question \"currently or since last group meeting,?have you had any suicidal thoughts or plan or intent to hurt yourself”, and patient also answered no to question of \"currently or since your last group meeting, have you had any homicidal thoughts or plan or intent to hurt others\".  Patient identified his goals during discussion and how they motivate his sobriety.  Patient identified his reasons for sobriety and support systems which will help him in transitioning to phase 3 of CD-IOP.  Patient identified his intention to join peer support upon graduating [facility].    On a 1:10 Scale (0-none, 10-high):    Anxiety: 3  Depression: " 0  Cravings: 0    Assessment     Diagnoses and all orders for this visit:    1. Opioid use disorder, severe, in early remission (Primary)    2. Cigarette nicotine dependence without complication             Progress toward goal: At goal    Functional Status: Moderate impairment     Prognosis: Good with Ongoing Treatment     Mental Status Exam:   Hygiene:   good  Cooperation:  Cooperative  Eye Contact:  Good  Psychomotor Behavior:  Appropriate  Affect:  Appropriate  Mood: normal  Speech:  Normal  Thought Process:  Linear  Thought Content:  Mood congruent  Suicidal:  None  Homicidal:  None  Hallucinations:  None  Delusion:  None  Memory:  Intact  Orientation:  Person  Reliability:  fair  Insight:  Fair  Judgement:  Fair  Impulse Control:  Fair  Physical/Medical Issues:  No      Assessment:  Engaged in activity/Process and self-disclosed: Yes  Affect:Appropriate   Applies topic to self: Yes  Able to give and receive feedback: Yes    Urinalysis: Negative confirmed results dated 10/02/2023  Labs:   Last Urine Toxicity  More data exists         Latest Ref Rng & Units 6/20/2023 6/13/2023   LAST URINE TOXICITY RESULTS   Amphetamine, Urine Qual Negative Negative  Negative    Barbiturates Screen, Urine Negative Negative  Negative    Benzodiazepine Screen, Urine Negative Negative  Negative    Buprenorphine, Screen, Urine Negative Negative  Negative    Cocaine Screen, Urine Negative Negative  Negative    Methadone Screen , Urine Negative Negative  Negative    Methamphetamine, Ur Negative Negative  Negative         Self-Reported days since last use: 202 days    12-Step attendance: 0 meetings    Plan:   Patient will continue in weekly group psychotherapy sessions and individual outpatient psychotherapy sessions every 3-4 weeks and will continue in self-help meetings and seek additional treatment if necessary following completion.    Patient will continue in IOP Phase two and three group.      Safety plan has previously been  discussed with patient.     EWA Collier  [unfilled]  18:10 EDT

## 2023-10-09 ENCOUNTER — LAB (OUTPATIENT)
Dept: LAB | Facility: HOSPITAL | Age: 38
End: 2023-10-09
Payer: MEDICAID

## 2023-10-09 ENCOUNTER — OFFICE VISIT (OUTPATIENT)
Dept: PSYCHIATRY | Facility: CLINIC | Age: 38
End: 2023-10-09
Payer: MEDICAID

## 2023-10-09 DIAGNOSIS — F11.20 OPIOID USE DISORDER, SEVERE, DEPENDENCE: ICD-10-CM

## 2023-10-09 DIAGNOSIS — F17.210 CIGARETTE NICOTINE DEPENDENCE WITHOUT COMPLICATION: ICD-10-CM

## 2023-10-09 DIAGNOSIS — F11.21 OPIOID USE DISORDER, SEVERE, IN EARLY REMISSION: Primary | ICD-10-CM

## 2023-10-09 LAB
ETHANOL BLD-MCNC: <10 MG/DL (ref 0–10)
ETHANOL UR QL: <0.01 %

## 2023-10-09 PROCEDURE — 80307 DRUG TEST PRSMV CHEM ANLYZR: CPT

## 2023-10-09 PROCEDURE — 82077 ASSAY SPEC XCP UR&BREATH IA: CPT

## 2023-10-09 PROCEDURE — 36415 COLL VENOUS BLD VENIPUNCTURE: CPT

## 2023-10-10 NOTE — PROGRESS NOTES
"[facility] PHASE II / Phase III GROUP NOTE    Name: Taye Ahmadi  Date: October 9, 2023    Time in:?4:30   Time out:?5:30   Participants: 9     Data:?1?hour group therapy session (Check in, goal setting, and coping skill activity)     Clinical Maneuvering/Interventions:?Therapist utilized a person-centered approach to build rapport with group member.?Therapist implemented motivational interviewing techniques to assist client with exploring and resolving ambivalence associated with commitment to change behaviors related to substance use and addiction.?Therapist applied cognitive behavioral strategies to facilitate identification of maladaptive patterns of thinking and behavior that contribute to client's risk for continued substance use and relapse. Therapist employed group interaction activities to build rapport among group members, promote sobriety, and emphasize relapse prevention.?Therapist promoted a safe nonjudgmental environment by providing group members with unconditional positive regard and encouraging group members to comply with group rules and guidelines. Therapist assisted group member with identifying and implementing healthier coping strategies.     Response:?Patient attended class in person. Patient participated in completion of check in form. Patient on check in form answered no to question \"currently or since last group meeting,?have you had any suicidal thoughts or plan or intent to hurt yourself", and patient also answered no to question of \"currently or since your last group meeting, have you had any homicidal thoughts or plan or intent to hurt others\".  Patient participated fully in group discussion.  Patient was able to provide positive feedback to other members of the group.  Patient was able to identify some major support systems that he has on his day-to-day.  Patient identified some worries that he has and challenged them through a coping skill activity during the group discussion.  Patient " was able to identify goals that he has been able to accomplish as well as new goals that he is working towards specially with wanting to be a .    On a 1:10 Scale (0-none, 10-high):    Anxiety: 4  Depression: 0  Cravings: 0    Assessment     Diagnoses and all orders for this visit:    1. Opioid use disorder, severe, in early remission (Primary)    2. Cigarette nicotine dependence without complication             Progress toward goal: At goal    Functional Status: Moderate impairment     Prognosis: Good with Ongoing Treatment     Mental Status Exam:   Hygiene:   good  Cooperation:  Cooperative  Eye Contact:  Good  Psychomotor Behavior:  Appropriate  Affect:  Appropriate  Mood: normal  Speech:  Normal  Thought Process:  Linear  Thought Content:  Mood congruent  Suicidal:  None  Homicidal:  None  Hallucinations:  None  Delusion:  None  Memory:  Intact  Orientation:  Person  Reliability:  fair  Insight:  Fair  Judgement:  Fair  Impulse Control:  Fair  Physical/Medical Issues:  No      Assessment:  Engaged in activity/Process and self-disclosed: Yes  Affect:Appropriate   Applies topic to self: Yes  Able to give and receive feedback: Yes    Urinalysis: Negative on prelim results dated 10/9/2023  Labs:   Last Urine Toxicity  More data exists         Latest Ref Rng & Units 6/20/2023 6/13/2023   LAST URINE TOXICITY RESULTS   Amphetamine, Urine Qual Negative Negative  Negative    Barbiturates Screen, Urine Negative Negative  Negative    Benzodiazepine Screen, Urine Negative Negative  Negative    Buprenorphine, Screen, Urine Negative Negative  Negative    Cocaine Screen, Urine Negative Negative  Negative    Methadone Screen , Urine Negative Negative  Negative    Methamphetamine, Ur Negative Negative  Negative         Self-Reported days since last use: 206 days    12-Step attendance: 0 meetings    Plan:   Patient will continue in weekly group psychotherapy sessions and individual outpatient psychotherapy  sessions every 3-4 weeks and will continue in self-help meetings and seek additional treatment if necessary following completion.    Patient will continue in IOP Phase two and three group.      Safety plan has previously been discussed with patient.     EWA Collier  [unfilled]  14:16 EDT

## 2023-10-16 ENCOUNTER — OFFICE VISIT (OUTPATIENT)
Dept: PSYCHIATRY | Facility: CLINIC | Age: 38
End: 2023-10-16
Payer: MEDICAID

## 2023-10-16 DIAGNOSIS — F11.21 OPIOID USE DISORDER, SEVERE, IN EARLY REMISSION: Primary | ICD-10-CM

## 2023-10-16 DIAGNOSIS — F17.210 CIGARETTE NICOTINE DEPENDENCE WITHOUT COMPLICATION: ICD-10-CM

## 2023-10-17 NOTE — PROGRESS NOTES
"[facility] PHASE II / Phase III GROUP NOTE    Name: Taye Ahmadi  Date: October 16, 2023    Time in:?4:30   Time out:?5:30   Participants: 8     Data: 1?hour group therapy session (Check in, daily reflection, changing the channel activity, coping skills)     Clinical Maneuvering/Interventions:?Therapist utilized a person-centered, Motivation interviewing, and CBT approaches to build rapport, exploring and resolving ambivalence associated with commitment to change behaviors related to substance use with and addiction, group member.?Therapist implemented motivational interviewing techniques to assist client with ,facilitate identification of maladaptive patterns of thinking and behavior that contribute to client's risk for continued substance use and relapse.?Therapist employed group interaction activities to build rapport among group members, promote sobriety, and emphasize relapse prevention.?Therapist promoted a safe nonjudgmental environment by providing group members with unconditional positive regard and encouraging group members to comply with group rules and guidelines. Therapist assisted group member with identifying and implementing healthier coping strategies.     Response:?Patient attended class in person. Patient participated in completion of check in form. Patient on check in form answered no to question \"currently or since last group meeting,?have you had any suicidal thoughts or plan or intent to hurt yourself”, and patient also answered no to question of \"currently or since your last group meeting, have you had any homicidal thoughts or plan or intent to hurt others\".  Patient participated fully in group discussion.  Patient provided a \"for the group to help them with their sobriety.  Patient was able to provide good insight on how that \"was beneficial to him and how he hoped to help other members of the group.  Patient identified some good insight to the daily reflection and how he has built a good " support system for himself.  Patient identified some channels that he used to get stuck on when he was under substance use and the channels that he uses in his thinking now to help him with maintaining his recovery.  Patient was able to identify couple coping skills such as breathing walking away and talking to someone has been very helpful to him when he is struggling with negative thoughts of himself or situations that would usually lead him to use.    On a 1:10 Scale (0-none, 10-high):    Anxiety: 2  Depression: 0  Cravings: 0    Assessment     Diagnoses and all orders for this visit:    1. Opioid use disorder, severe, in early remission (Primary)    2. Cigarette nicotine dependence without complication             Progress toward goal: At goal    Functional Status: Moderate impairment     Prognosis: Good with Ongoing Treatment     Mental Status Exam:   Hygiene:   good  Cooperation:  Cooperative  Eye Contact:  Good  Psychomotor Behavior:  Appropriate  Affect:  Appropriate  Mood: normal  Speech:  Normal  Thought Process:  Linear  Thought Content:  Mood congruent  Suicidal:  None  Homicidal:  None  Hallucinations:  None  Delusion:  None  Memory:  Intact  Orientation:  Person  Reliability:  fair  Insight:  Fair  Judgement:  Fair  Impulse Control:  Fair  Physical/Medical Issues:  No      Assessment:  Engaged in activity/Process and self-disclosed: Yes  Affect:Appropriate   Applies topic to self: Yes  Able to give and receive feedback: Yes    Urinalysis: Negative confirmed results 10/9/2023  Labs:   Last Urine Toxicity  More data exists         Latest Ref Rng & Units 6/20/2023 6/13/2023   LAST URINE TOXICITY RESULTS   Amphetamine, Urine Qual Negative Negative  Negative    Barbiturates Screen, Urine Negative Negative  Negative    Benzodiazepine Screen, Urine Negative Negative  Negative    Buprenorphine, Screen, Urine Negative Negative  Negative    Cocaine Screen, Urine Negative Negative  Negative    Methadone Screen ,  Urine Negative Negative  Negative    Methamphetamine, Ur Negative Negative  Negative         Self-Reported days since last use: 213 days    12-Step attendance: 0 meetings    Plan:   Patient will continue in weekly group psychotherapy sessions and individual outpatient psychotherapy sessions every 3-4 weeks and will continue in self-help meetings and seek additional treatment if necessary following completion.    Patient will continue in IOP Phase two and three group.      Safety plan has previously been discussed with patient.     EWA Collier  [unfilled]  17:40 EDT

## 2023-10-18 ENCOUNTER — LAB (OUTPATIENT)
Dept: LAB | Facility: HOSPITAL | Age: 38
End: 2023-10-18
Payer: MEDICAID

## 2023-10-18 DIAGNOSIS — F11.20 OPIOID USE DISORDER, SEVERE, DEPENDENCE: ICD-10-CM

## 2023-10-18 LAB
ETHANOL BLD-MCNC: <10 MG/DL (ref 0–10)
ETHANOL UR QL: <0.01 %

## 2023-10-18 PROCEDURE — 80307 DRUG TEST PRSMV CHEM ANLYZR: CPT

## 2023-10-18 PROCEDURE — 82077 ASSAY SPEC XCP UR&BREATH IA: CPT

## 2023-10-18 PROCEDURE — 36415 COLL VENOUS BLD VENIPUNCTURE: CPT

## 2023-10-23 ENCOUNTER — OFFICE VISIT (OUTPATIENT)
Dept: PSYCHIATRY | Facility: CLINIC | Age: 38
End: 2023-10-23
Payer: MEDICAID

## 2023-10-23 ENCOUNTER — LAB (OUTPATIENT)
Dept: LAB | Facility: HOSPITAL | Age: 38
End: 2023-10-23
Payer: MEDICAID

## 2023-10-23 DIAGNOSIS — F17.210 CIGARETTE NICOTINE DEPENDENCE WITHOUT COMPLICATION: ICD-10-CM

## 2023-10-23 DIAGNOSIS — F11.21 OPIOID USE DISORDER, SEVERE, IN EARLY REMISSION: Primary | ICD-10-CM

## 2023-10-23 DIAGNOSIS — F11.20 OPIOID USE DISORDER, SEVERE, DEPENDENCE: ICD-10-CM

## 2023-10-23 LAB
AMPHETAMINES SPEC-MCNC: NEGATIVE NG/ML
BARBITURATES SERPLBLD QL: NEGATIVE UG/ML
BENZODIAZ BLD QL: NEGATIVE NG/ML
BUPRENORPHINE BLD QL SCN: NEGATIVE NG/ML
CANNABINOIDS BLD QL SCN: NEGATIVE NG/ML
CARISOPRODOL+MEPROB BLD QL SCN: NEGATIVE UG/ML
COCAINE+BZE SERPLBLD QL SCN: NEGATIVE NG/ML
ETHANOL BLD-MCNC: <10 MG/DL (ref 0–10)
ETHANOL UR QL: <0.01 %
FENTANYL BLD QL SCN: NEGATIVE NG/ML
GABAPENTIN SERPLBLD QL SCN: NEGATIVE UG/ML
MEPERIDINE SERPLBLD QL SCN: NEGATIVE NG/ML
METHADONE BLD QL SCN: NEGATIVE NG/ML
OPIATES BLD QL SCN: NEGATIVE NG/ML
OXYCODONE+OXYMORPHONE SERPLBLD QL SCN: NEGATIVE NG/ML
PCP BLD QL SCN: NEGATIVE NG/ML
PROPOXYPH BLD QL SCN: NEGATIVE NG/ML
TRAMADOL BLD QL SCN: NEGATIVE NG/ML

## 2023-10-23 PROCEDURE — 36415 COLL VENOUS BLD VENIPUNCTURE: CPT

## 2023-10-23 PROCEDURE — 90853 GROUP PSYCHOTHERAPY: CPT | Performed by: COUNSELOR

## 2023-10-23 PROCEDURE — 1160F RVW MEDS BY RX/DR IN RCRD: CPT | Performed by: COUNSELOR

## 2023-10-23 PROCEDURE — 1159F MED LIST DOCD IN RCRD: CPT | Performed by: COUNSELOR

## 2023-10-23 PROCEDURE — 82077 ASSAY SPEC XCP UR&BREATH IA: CPT

## 2023-10-23 PROCEDURE — 80307 DRUG TEST PRSMV CHEM ANLYZR: CPT

## 2023-10-24 NOTE — PROGRESS NOTES
"[facility] PHASE II / Phase III GROUP NOTE    Name: Taye Ahmadi  Date: October 23, 2023    Time in:?4:30   Time out:?5:30   Participants: 10       Data: 1?hour group therapy session (Check in, just for today reading, and relapse justification triggers)     Clinical Maneuvering/Interventions:?Therapist utilized a person-centered, Motivation interviewing, and CBT approaches to build rapport, exploring and resolving ambivalence associated with commitment to change behaviors related to substance use with and addiction, group member.?Therapist implemented motivational interviewing techniques to assist client with ,facilitate identification of maladaptive patterns of thinking and behavior that contribute to client's risk for continued substance use and relapse.?Therapist employed group interaction activities to build rapport among group members, promote sobriety, and emphasize relapse prevention.?Therapist promoted a safe nonjudgmental environment by providing group members with unconditional positive regard and encouraging group members to comply with group rules and guidelines. Therapist assisted group member with identifying and implementing healthier coping strategies.     Response:?Patient attended class in person. Patient participated in completion of check in form. Patient on check in form answered no to question \"currently or since last group meeting,?have you had any suicidal thoughts or plan or intent to hurt yourself”, and patient also answered no to question of \"currently or since your last group meeting, have you had any homicidal thoughts or plan or intent to hurt others\".  Patient participated fully in group discussion.  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  Patient was able to identify some relapse justification " triggers that he struggled with in the past and how he has used his coping skills and support systems to help him overcome them.  Patient was able to resonate with the just for the day reading and identifying how his anger is sometimes based around him trying to control his situation.  Patient was able to identify that he has been utilizing coping skills such as walking away and taking deep breaths and reaching out to his support system to help him with his anger when he is dealing with individuals or situations that he is trying to control subconsciously.    On a 1:10 Scale (0-none, 10-high):    Anxiety: 2  Depression: 0  Cravings: 0    Assessment     Diagnoses and all orders for this visit:    1. Opioid use disorder, severe, in early remission (Primary)    2. Cigarette nicotine dependence without complication             Progress toward goal: At goal    Functional Status: Moderate impairment     Prognosis: Good with Ongoing Treatment     Mental Status Exam:   Hygiene:   good  Cooperation:  Cooperative  Eye Contact:  Good  Psychomotor Behavior:  Appropriate  Affect:  Appropriate  Mood: normal  Speech:  Normal  Thought Process:  Linear  Thought Content:  Mood congruent  Suicidal:  None  Homicidal:  None  Hallucinations:  None  Delusion:  None  Memory:  Intact  Orientation:  Person  Reliability:  fair  Insight:  Fair  Judgement:  Fair  Impulse Control:  Fair  Physical/Medical Issues:  No      Assessment:  Engaged in activity/Process and self-disclosed: Yes  Affect:Appropriate   Applies topic to self: Yes  Able to give and receive feedback: Yes    Urinalysis: Negative on prelim results dated 10/23/2023  Labs:   Last Urine Toxicity  More data exists         Latest Ref Rng & Units 6/20/2023 6/13/2023   LAST URINE TOXICITY RESULTS   Amphetamine, Urine Qual Negative Negative  Negative    Barbiturates Screen, Urine Negative Negative  Negative    Benzodiazepine Screen, Urine Negative Negative  Negative    Buprenorphine,  Screen, Urine Negative Negative  Negative    Cocaine Screen, Urine Negative Negative  Negative    Methadone Screen , Urine Negative Negative  Negative    Methamphetamine, Ur Negative Negative  Negative         Self-Reported days since last use: 220 days    12-Step attendance: 1 meetings    Plan:   Patient will continue in weekly group psychotherapy sessions and individual outpatient psychotherapy sessions every 3-4 weeks and will continue in self-help meetings and seek additional treatment if necessary following completion.    Patient will continue in IOP Phase two and three group.      Safety plan has previously been discussed with patient.     EWA Collier  [unfilled]  08:00 EDT

## 2023-10-30 ENCOUNTER — OFFICE VISIT (OUTPATIENT)
Dept: PSYCHIATRY | Facility: CLINIC | Age: 38
End: 2023-10-30
Payer: MEDICAID

## 2023-10-30 DIAGNOSIS — F17.210 CIGARETTE NICOTINE DEPENDENCE WITHOUT COMPLICATION: ICD-10-CM

## 2023-10-30 DIAGNOSIS — F11.21 OPIOID USE DISORDER, SEVERE, IN EARLY REMISSION: Primary | ICD-10-CM

## 2023-10-31 ENCOUNTER — LAB (OUTPATIENT)
Dept: LAB | Facility: HOSPITAL | Age: 38
End: 2023-10-31
Payer: MEDICAID

## 2023-10-31 DIAGNOSIS — F11.20 OPIOID USE DISORDER, SEVERE, DEPENDENCE: ICD-10-CM

## 2023-10-31 LAB
ETHANOL BLD-MCNC: <10 MG/DL (ref 0–10)
ETHANOL UR QL: <0.01 %

## 2023-10-31 PROCEDURE — 36415 COLL VENOUS BLD VENIPUNCTURE: CPT

## 2023-10-31 PROCEDURE — 82077 ASSAY SPEC XCP UR&BREATH IA: CPT

## 2023-10-31 PROCEDURE — 80307 DRUG TEST PRSMV CHEM ANLYZR: CPT

## 2023-11-01 NOTE — PROGRESS NOTES
"[facility] PHASE II / Phase III GROUP NOTE    Name: Taye Ahmadi  Date: October 30, 2023    Time in:?4:30   Time out:?5:30   Participants: 10     Data: 1?hour group therapy session (Check in, coping skills, and health and wellness of the mind)     Clinical Maneuvering/Interventions:?Therapist utilized a person-centered, Motivation interviewing, and CBT approaches to build rapport, exploring and resolving ambivalence associated with commitment to change behaviors related to substance use with and addiction, group member.?Therapist implemented motivational interviewing techniques to assist client with ,facilitate identification of maladaptive patterns of thinking and behavior that contribute to client's risk for continued substance use and relapse.?Therapist employed group interaction activities to build rapport among group members, promote sobriety, and emphasize relapse prevention.?Therapist promoted a safe nonjudgmental environment by providing group members with unconditional positive regard and encouraging group members to comply with group rules and guidelines. Therapist assisted group member with identifying and implementing healthier coping strategies.     Response:?Patient attended class in person. Patient participated in completion of check in form. Patient on check in form answered no to question \"currently or since last group meeting,?have you had any suicidal thoughts or plan or intent to hurt yourself”, and patient also answered no to question of \"currently or since your last group meeting, have you had any homicidal thoughts or plan or intent to hurt others\".  Patient participated fully in group discussion.  Patient expressed some struggle with some anxiety over the past week.  Patient identified some supports and coping skills that he has been utilizing to help him with his anxiety.  Patient identified some healthy and wellness mindset activities that he has been practicing and want to continue " practicing to help him with maintaining his sobriety.    On a 1:10 Scale (0-none, 10-high):    Anxiety: 2  Depression: 0  Cravings: 0    Assessment     Diagnoses and all orders for this visit:    1. Opioid use disorder, severe, in early remission (Primary)    2. Cigarette nicotine dependence without complication             Progress toward goal: At goal    Functional Status: Moderate impairment     Prognosis: Good with Ongoing Treatment     Mental Status Exam:   Hygiene:   good  Cooperation:  Cooperative  Eye Contact:  Good  Psychomotor Behavior:  Appropriate  Affect:  Appropriate  Mood: normal  Speech:  Normal  Thought Process:  Linear  Thought Content:  Mood congruent  Suicidal:  None  Homicidal:  None  Hallucinations:  None  Delusion:  None  Memory:  Intact  Orientation:  Person  Reliability:  fair  Insight:  Fair  Judgement:  Fair  Impulse Control:  Fair  Physical/Medical Issues:  No      Assessment:  Engaged in activity/Process and self-disclosed: Yes  Affect:Appropriate   Applies topic to self: Yes  Able to give and receive feedback: Yes    Urinalysis: Negative confirmed on results dated 10/23/23  Labs:   Last Urine Toxicity  More data exists         Latest Ref Rng & Units 6/20/2023 6/13/2023   LAST URINE TOXICITY RESULTS   Amphetamine, Urine Qual Negative Negative  Negative    Barbiturates Screen, Urine Negative Negative  Negative    Benzodiazepine Screen, Urine Negative Negative  Negative    Buprenorphine, Screen, Urine Negative Negative  Negative    Cocaine Screen, Urine Negative Negative  Negative    Methadone Screen , Urine Negative Negative  Negative    Methamphetamine, Ur Negative Negative  Negative         Self-Reported days since last use: 227 days    12-Step attendance: 1 meetings    Plan:   Patient will continue in weekly group psychotherapy sessions and individual outpatient psychotherapy sessions every 3-4 weeks and will continue in self-help meetings and seek additional treatment if necessary  following completion.    Patient will continue in IOP Phase two and three group.      Safety plan has previously been discussed with patient.     EWA Collier  [unfilled]  07:09 EDT

## 2023-11-06 ENCOUNTER — OFFICE VISIT (OUTPATIENT)
Dept: PSYCHIATRY | Facility: CLINIC | Age: 38
End: 2023-11-06
Payer: MEDICAID

## 2023-11-06 DIAGNOSIS — F11.21 OPIOID USE DISORDER, SEVERE, IN EARLY REMISSION: Primary | ICD-10-CM

## 2023-11-07 NOTE — PROGRESS NOTES
"[facility] PHASE II / Phase III GROUP NOTE    Name: Taye Ahmadi  Date: November 6, 2023    Time in:?4:30   Time out:?5:30   Participants: 10      Data: 1?hour group therapy session (Check in, daily reflection reading, and Holiday activity)     Clinical Maneuvering/Interventions:?Therapist utilized a person-centered, Motivation interviewing, and CBT approaches to build rapport, exploring and resolving ambivalence associated with commitment to change behaviors related to substance use with and addiction, group member.?Therapist implemented motivational interviewing techniques to assist client with ,facilitate identification of maladaptive patterns of thinking and behavior that contribute to client's risk for continued substance use and relapse.?Therapist employed group interaction activities to build rapport among group members, promote sobriety, and emphasize relapse prevention.?Therapist promoted a safe nonjudgmental environment by providing group members with unconditional positive regard and encouraging group members to comply with group rules and guidelines. Therapist assisted group member with identifying and implementing healthier coping strategies.     Response:?Patient attended class in person. Patient participated in completion of check in form. Patient on check in form answered no to question \"currently or since last group meeting,?have you had any suicidal thoughts or plan or intent to hurt yourself”, and patient also answered no to question of \"currently or since your last group meeting, have you had any homicidal thoughts or plan or intent to hurt others\".  Patient participated fully in group discussion.  Patient provided some good insight to the daily reflection reading and how it has played factors in his life.  Patient identified that humility is important to his growth and finding the right steps and reaching his goals.  Patient expressed some insight with some family traditions that he enjoys " participating with and how it helps him and his family process major events in her lives and allows for growth in his recovery.      On a 1:10 Scale (0-none, 10-high):    Anxiety: 2  Depression: 1  Cravings: 0    Assessment     Diagnoses and all orders for this visit:    1. Opioid use disorder, severe, in early remission (Primary)             Progress toward goal: At goal    Functional Status: Moderate impairment     Prognosis: Good with Ongoing Treatment     Mental Status Exam:   Hygiene:   good  Cooperation:  Cooperative  Eye Contact:  Good  Psychomotor Behavior:  Appropriate  Affect:  Appropriate  Mood: normal  Speech:  Normal  Thought Process:  Linear  Thought Content:  Mood congruent  Suicidal:  None  Homicidal:  None  Hallucinations:  None  Delusion:  None  Memory:  Intact  Orientation:  Person  Reliability:  fair  Insight:  Fair  Judgement:  Fair  Impulse Control:  Fair  Physical/Medical Issues:  No      Assessment:  Engaged in activity/Process and self-disclosed: Yes  Affect:Appropriate   Applies topic to self: Yes  Able to give and receive feedback: Yes    Urinalysis: Negative on confirmation results 10/31/23  Labs:   Last Urine Toxicity  More data exists         Latest Ref Rng & Units 6/20/2023 6/13/2023   LAST URINE TOXICITY RESULTS   Amphetamine, Urine Qual Negative Negative  Negative    Barbiturates Screen, Urine Negative Negative  Negative    Benzodiazepine Screen, Urine Negative Negative  Negative    Buprenorphine, Screen, Urine Negative Negative  Negative    Cocaine Screen, Urine Negative Negative  Negative    Methadone Screen , Urine Negative Negative  Negative    Methamphetamine, Ur Negative Negative  Negative         Self-Reported days since last use: 234 days    12-Step attendance: 3 days    Plan:   Patient will continue in weekly group psychotherapy sessions and individual outpatient psychotherapy sessions every 3-4 weeks and will continue in self-help meetings and seek additional treatment if  necessary following completion.    Patient will continue in IOP Phase two and three group.      Safety plan has previously been discussed with patient.     EWA Collier  [unfilled]  10:31 EST

## 2023-11-08 ENCOUNTER — LAB (OUTPATIENT)
Dept: LAB | Facility: HOSPITAL | Age: 38
End: 2023-11-08
Payer: MEDICAID

## 2023-11-08 DIAGNOSIS — F11.20 OPIOID USE DISORDER, SEVERE, DEPENDENCE: ICD-10-CM

## 2023-11-08 LAB
ETHANOL BLD-MCNC: 17 MG/DL (ref 0–10)
ETHANOL UR QL: 0.02 %

## 2023-11-08 PROCEDURE — 80307 DRUG TEST PRSMV CHEM ANLYZR: CPT

## 2023-11-08 PROCEDURE — 36415 COLL VENOUS BLD VENIPUNCTURE: CPT

## 2023-11-08 PROCEDURE — 82077 ASSAY SPEC XCP UR&BREATH IA: CPT

## 2023-11-09 ENCOUNTER — TELEPHONE (OUTPATIENT)
Dept: PSYCHIATRY | Facility: CLINIC | Age: 38
End: 2023-11-09
Payer: MEDICAID

## 2023-11-09 ENCOUNTER — LAB (OUTPATIENT)
Dept: LAB | Facility: HOSPITAL | Age: 38
End: 2023-11-09
Payer: MEDICAID

## 2023-11-09 DIAGNOSIS — F11.20 OPIOID USE DISORDER, SEVERE, DEPENDENCE: ICD-10-CM

## 2023-11-09 LAB
AMPHET+METHAMPHET UR QL: NEGATIVE
AMPHETAMINES UR QL: NEGATIVE
BARBITURATES UR QL SCN: NEGATIVE
BENZODIAZ UR QL SCN: NEGATIVE
BUPRENORPHINE SERPL-MCNC: NEGATIVE NG/ML
CANNABINOIDS SERPL QL: NEGATIVE
COCAINE UR QL: NEGATIVE
METHADONE UR QL SCN: NEGATIVE
OPIATES UR QL: NEGATIVE
OXYCODONE UR QL SCN: NEGATIVE
PCP UR QL SCN: NEGATIVE
TRICYCLICS UR QL SCN: NEGATIVE

## 2023-11-09 PROCEDURE — 80306 DRUG TEST PRSMV INSTRMNT: CPT

## 2023-11-09 NOTE — TELEPHONE ENCOUNTER
Patient called in question results in labs. He said that ethanol was a point <17 states he doesn't drink and wants to figure out why this level is showing. He said that he came from work straight to the lab and is wondering what it could be.

## 2023-11-09 NOTE — TELEPHONE ENCOUNTER
Typically this can be from taking a liquid medication. They often have alcohol base. Also mouthwash. He can come in a do a urine test if he would like.

## 2023-11-13 ENCOUNTER — OFFICE VISIT (OUTPATIENT)
Dept: PSYCHIATRY | Facility: CLINIC | Age: 38
End: 2023-11-13
Payer: MEDICAID

## 2023-11-13 ENCOUNTER — LAB (OUTPATIENT)
Dept: LAB | Facility: HOSPITAL | Age: 38
End: 2023-11-13
Payer: MEDICAID

## 2023-11-13 DIAGNOSIS — F17.210 CIGARETTE NICOTINE DEPENDENCE WITHOUT COMPLICATION: ICD-10-CM

## 2023-11-13 DIAGNOSIS — F11.21 OPIOID USE DISORDER, SEVERE, IN EARLY REMISSION: Primary | ICD-10-CM

## 2023-11-13 LAB
ETHANOL BLD-MCNC: <10 MG/DL (ref 0–10)
ETHANOL UR QL: <0.01 %

## 2023-11-13 PROCEDURE — 36415 COLL VENOUS BLD VENIPUNCTURE: CPT

## 2023-11-13 PROCEDURE — 82077 ASSAY SPEC XCP UR&BREATH IA: CPT

## 2023-11-13 PROCEDURE — 80307 DRUG TEST PRSMV CHEM ANLYZR: CPT

## 2023-11-14 NOTE — PROGRESS NOTES
"[facility] PHASE II / Phase III GROUP NOTE    Name: Taye Ahmadi  Date: November 13, 2023    Time in:?4:30   Time out:?5:30   Participants: 10     Data: 1?hour group therapy session (Check in, scenarios, and willpower)     Clinical Maneuvering/Interventions:?Therapist utilized a person-centered, Motivation interviewing, and CBT approaches to build rapport, exploring and resolving ambivalence associated with commitment to change behaviors related to substance use with and addiction, group member.?Therapist implemented motivational interviewing techniques to assist client with ,facilitate identification of maladaptive patterns of thinking and behavior that contribute to client's risk for continued substance use and relapse.?Therapist employed group interaction activities to build rapport among group members, promote sobriety, and emphasize relapse prevention.?Therapist promoted a safe nonjudgmental environment by providing group members with unconditional positive regard and encouraging group members to comply with group rules and guidelines. Therapist assisted group member with identifying and implementing healthier coping strategies.     Response:?Patient attended class in person. Patient participated in completion of check in form. Patient on check in form answered no to question \"currently or since last group meeting,?have you had any suicidal thoughts or plan or intent to hurt yourself”, and patient also answered no to question of \"currently or since your last group meeting, have you had any homicidal thoughts or plan or intent to hurt others\".  Patient participated fully in group discussion.  Patient provided some good insight and words of encouragement to other members of the group.  Patient identified some goals that he plans to continue to work towards in his life to maintain his recovery through the use of willpower.  Patient was able to identify some good responses and techniques that he can utilize in some " relapse situation from the scenario activities.    On a 1:10 Scale (0-none, 10-high):    Anxiety: 2  Depression: 0  Cravings: 0    Assessment     Diagnoses and all orders for this visit:    1. Opioid use disorder, severe, in early remission (Primary)    2. Cigarette nicotine dependence without complication             Progress toward goal: At goal    Functional Status: Moderate impairment     Prognosis: Good with Ongoing Treatment     Mental Status Exam:   Hygiene:   good  Cooperation:  Cooperative  Eye Contact:  Good  Psychomotor Behavior:  Appropriate  Affect:  Appropriate  Mood: normal  Speech:  Normal  Thought Process:  Linear  Thought Content:  Mood congruent  Suicidal:  None  Homicidal:  None  Hallucinations:  None  Delusion:  None  Memory:  Intact  Orientation:  Person  Reliability:  fair  Insight:  Fair  Judgement:  Fair  Impulse Control:  Fair  Physical/Medical Issues:  No      Assessment:  Engaged in activity/Process and self-disclosed: Yes  Affect:Appropriate   Applies topic to self: Yes  Able to give and receive feedback: Yes    Urinalysis: Negative on prelim results dated 11/13/2023  Labs:   Last Urine Toxicity  More data exists         Latest Ref Rng & Units 11/9/2023 6/20/2023   LAST URINE TOXICITY RESULTS   Amphetamine, Urine Qual Negative Negative  Negative    Barbiturates Screen, Urine Negative Negative  Negative    Benzodiazepine Screen, Urine Negative Negative  Negative    Buprenorphine, Screen, Urine Negative Negative  Negative    Cocaine Screen, Urine Negative Negative  Negative    Methadone Screen , Urine Negative Negative  Negative    Methamphetamine, Ur Negative Negative  Negative         Self-Reported days since last use: 241 days    12-Step attendance: 1 meeting    Plan:   Patient will continue in weekly group psychotherapy sessions and individual outpatient psychotherapy sessions every 3-4 weeks and will continue in self-help meetings and seek additional treatment if necessary following  completion.    Patient will continue in IOP Phase two and three group.      Safety plan has previously been discussed with patient.     EWA Collier  [unfilled]  08:26 EST

## 2023-11-15 LAB
1OH-MIDAZOLAM UR CFM-MCNC: NEGATIVE NG/ML
6MAM UR CFM-MCNC: NEGATIVE NG/ML
7AMINOCLONAZEPAM UR CFM-MCNC: NEGATIVE NG/ML
7AMINOCLONAZEPAM UR CFM-MCNC: NEGATIVE NG/ML
A-OH ALPRAZ UR CFM-MCNC: NEGATIVE NG/ML
ALPRAZ UR CFM-MCNC: NEGATIVE NG/ML
AMOBARBITAL UR CFM-MCNC: NEGATIVE NG/ML
AMPHET UR CFM-MCNC: NEGATIVE NG/ML
AMPHETAMINES SPEC-MCNC: NEGATIVE NG/ML
BARBITURATES SERPLBLD QL: NEGATIVE UG/ML
BENZODIAZ BLD QL: NEGATIVE NG/ML
BUPRENORPHINE BLD QL SCN: NEGATIVE NG/ML
BUPRENORPHINE UR-MCNC: NEGATIVE NG/ML
BUTABARBITAL UR CFM-MCNC: NEGATIVE NG/ML
BUTALBITAL UR CFM-MCNC: NEGATIVE NG/ML
BZE UR CFM-MCNC: NEGATIVE NG/ML
CANNABINOIDS BLD QL SCN: NEGATIVE NG/ML
CARBOXYTHC UR CFM-MCNC: NEGATIVE NG/ML
CARISOPRODOL UR CFM-MCNC: NEGATIVE NG/ML
CARISOPRODOL+MEPROB BLD QL SCN: NEGATIVE UG/ML
COCAINE+BZE SERPLBLD QL SCN: NEGATIVE NG/ML
CODEINE UR CFM-MCNC: NEGATIVE NG/ML
CREATININE: 63.2 MG/DL
DIAZEPAM UR CFM-MCNC: NEGATIVE NG/ML
EDDP UR CFM-MCNC: NEGATIVE NG/ML
ETHYL GLUCURONIDE UR CFM-MCNC: NEGATIVE NG/ML
FENTANYL BLD QL SCN: NEGATIVE NG/ML
FENTANYL UR-MCNC: NEGATIVE NG/ML
GABAPENTIN SERPLBLD QL SCN: NEGATIVE UG/ML
GABAPENTIN UR CFM-MCNC: NEGATIVE NG/ML
HYDROCODONE UR CFM-MCNC: NEGATIVE NG/ML
HYDROMORPHONE UR CFM-MCNC: NEGATIVE NG/ML
LORAZEPAM UR CFM-MCNC: NEGATIVE NG/ML
MDMA UR CFM-MCNC: NEGATIVE NG/ML
ME-PHENIDATE UR CFM-MCNC: NEGATIVE NG/ML
MEPERIDINE SERPLBLD QL SCN: NEGATIVE NG/ML
METHADONE BLD QL SCN: NEGATIVE NG/ML
METHADONE UR CFM-MCNC: NEGATIVE NG/ML
METHAMPHET UR CFM-MCNC: NEGATIVE NG/ML
MIDAZOLAM UR CFM-MCNC: NEGATIVE NG/ML
MORPHINE UR CFM-MCNC: NEGATIVE NG/ML
NORBUPRENORPHINE UR CFM-MCNC: NEGATIVE NG/ML
NORDIAZEPAM UR CFM-MCNC: NEGATIVE NG/ML
NORFENTANYL UR CFM-MCNC: NEGATIVE NG/ML
NORHYDROCODONE UR CFM-MCNC: NEGATIVE NG/ML
NOROXYCODONE UR CFM-MCNC: NEGATIVE NG/ML
OPIATES BLD QL SCN: NEGATIVE NG/ML
OXAZEPAM CTO UR CFM-MCNC: NEGATIVE NG/ML
OXYCODONE SERPL-MCNC: NEGATIVE NG/ML
OXYCODONE+OXYMORPHONE SERPLBLD QL SCN: NEGATIVE NG/ML
OXYMORPHONE SERPL-MCNC: NEGATIVE NG/ML
PCP BLD QL SCN: NEGATIVE NG/ML
PCP UR CFM-MCNC: NEGATIVE NG/ML
PH: 6.3 (ref 4.5–9)
PHENOBARB UR CFM-MCNC: NEGATIVE NG/ML
PHENTERMINE: NEGATIVE NG/ML
PPAA UR CFM-MCNC: NEGATIVE NG/ML
PREGABALIN UR CFM-MCNC: NEGATIVE NG/ML
PROPOXYPH BLD QL SCN: NEGATIVE NG/ML
SECOBARBITAL UR CFM-MCNC: NEGATIVE NG/ML
SPECIFIC GRAVITY: 1.01 (ref 1–1.03)
TAPENTADOL UR CFM-MCNC: NEGATIVE NG/ML
TEMAZEPAM UR CFM-MCNC: NEGATIVE NG/ML
TRAMADOL BLD QL SCN: NEGATIVE NG/ML
TRAMADOL: NEGATIVE NG/ML
ZOLPIDEM PHENYL-4-CARB UR CFM-MCNC: NEGATIVE NG/ML
ZOLPIDEM UR CFM-MCNC: NEGATIVE NG/ML

## 2023-11-16 ENCOUNTER — DOCUMENTATION (OUTPATIENT)
Dept: PSYCHIATRY | Facility: CLINIC | Age: 38
End: 2023-11-16
Payer: MEDICAID

## 2023-11-16 NOTE — PROGRESS NOTES
[facility]  [address], [address]  [phone]    CHEMICAL DEPENDENCY   INTENSIVE OUTPATIENT PROGRAM    PHASE II and III   DISCHARGE SUMMARY        ATTENDING: ELLIS Mane   THERAPIST: EWA Collier     DATE OF ADMISSION: 05/01/2023    DATE OF DISCHARGE: 11/13/2023     REASON FOR ADMISSION: Taye Ahmadi was referred by Self and admitted to IOP Phase I for Opioid use disorder, severe, dependence.      SUMMARY OF CARE, TREATMENT, and SERVICES PROVIDED: Taye Ahmadi was assessed and determined to meet CD-IOP level of care on 05/01/2023.  Pt began IOP on 05/02/2023 and attended 24 phase 1 classes with 0 absences. Pt began phase 2 of CD-IOP on 06/29/2023 and attended 25 classes with 0 absences. Pt started phase 3 of CD-IOP on 10/9/23 with 0 absences. Pt attended a total of 56 CD-IOP classes since starting that program. Since starting phase 2 and 3 Taye Ahmadi has failed 0 screens. The patient was successful in the program and completed all requirements.     AFTERCARE PLAN:  Taye Ahmadi completed [facility] Chemical Dependency IOP Phase II on 10/05/2023 and Phase III on 11/13/23. Pt was encouraged to continue recovery/spiritual support group meetings and daily communication with sponsor/[facility] support system.      Current Outpatient Medications:     albuterol sulfate  (90 Base) MCG/ACT inhaler, Inhale 2 puffs Every 6 (Six) Hours As Needed for Wheezing or Shortness of Air., Disp: 18 g, Rfl: 0    benzonatate (TESSALON) 100 MG capsule, Take 1 capsule by mouth 3 (Three) Times a Day As Needed for Cough., Disp: 30 capsule, Rfl: 0    doxycycline (VIBRAMYCIN) 100 MG capsule, Take 1 capsule by mouth 2 (Two) Times a Day., Disp: 14 capsule, Rfl: 0    predniSONE (DELTASONE) 20 MG tablet, Take 1 tablet by mouth 2 (Two) Times a Day., Disp: 10 tablet, Rfl: 0     [unfilled]    PROGNOSIS: good with continued care       EWA Collier

## 2024-05-26 ENCOUNTER — HOSPITAL ENCOUNTER (EMERGENCY)
Facility: HOSPITAL | Age: 39
Discharge: HOME | End: 2024-05-27
Attending: STUDENT IN AN ORGANIZED HEALTH CARE EDUCATION/TRAINING PROGRAM
Payer: COMMERCIAL

## 2024-05-26 DIAGNOSIS — M54.12 CERVICAL RADICULOPATHY: Primary | ICD-10-CM

## 2024-05-26 PROCEDURE — 99282 EMERGENCY DEPT VISIT SF MDM: CPT

## 2024-05-27 VITALS
BODY MASS INDEX: 28.44 KG/M2 | HEIGHT: 72 IN | HEART RATE: 79 BPM | SYSTOLIC BLOOD PRESSURE: 140 MMHG | WEIGHT: 210 LBS | OXYGEN SATURATION: 98 % | DIASTOLIC BLOOD PRESSURE: 92 MMHG | RESPIRATION RATE: 16 BRPM | TEMPERATURE: 98 F

## 2024-05-27 PROCEDURE — 96372 THER/PROPH/DIAG INJ SC/IM: CPT

## 2024-05-27 PROCEDURE — 25010000002 KETOROLAC TROMETHAMINE PER 15 MG: Performed by: STUDENT IN AN ORGANIZED HEALTH CARE EDUCATION/TRAINING PROGRAM

## 2024-05-27 RX ORDER — METHYLPREDNISOLONE 4 MG/1
TABLET ORAL
Qty: 21 TABLET | Refills: 0 | Status: SHIPPED | OUTPATIENT
Start: 2024-05-27

## 2024-05-27 RX ORDER — KETOROLAC TROMETHAMINE 30 MG/ML
15 INJECTION, SOLUTION INTRAMUSCULAR; INTRAVENOUS ONCE
Status: COMPLETED | OUTPATIENT
Start: 2024-05-27 | End: 2024-05-27

## 2024-05-27 RX ADMIN — KETOROLAC TROMETHAMINE 15 MG: 30 INJECTION, SOLUTION INTRAMUSCULAR; INTRAVENOUS at 01:09

## 2024-05-27 NOTE — ED PROVIDER NOTES
EMERGENCY DEPARTMENT ENCOUNTER    Pt Name: Taye Ahmadi  MRN: [id number]  Pt :   1985  Room Number:  01SF/01  Date of encounter:  2024  PCP: Merry Fuentes MD  ED Provider: Micky Suarez MD    Historian: Patient      HPI:  Chief Complaint: Neck pain        Context: Taye Ahmadi is a 38 y.o. male who presents to the ED for neck pain.  Patient reports left-sided neck pain ongoing for the past several days.  He states pain is mostly left-sided radiates down the left arm with some associated tingling in the fingers.  He states several weeks ago he tweaked his neck while he was lifting heavy objects.  He denies any history of overt neck trauma or prior injury to the neck.  Denies major medical problems denies IV drug use or alcohol use.  No fevers.  No chest pain.      PAST MEDICAL HISTORY  History reviewed. No pertinent past medical history.      PAST SURGICAL HISTORY  Past Surgical History:   Procedure Laterality Date    APPENDECTOMY      KNEE ARTHROSCOPY W/ ACL RECONSTRUCTION Right     KNEE SURGERY Right          FAMILY HISTORY  History reviewed. No pertinent family history.      SOCIAL HISTORY  Social History     Socioeconomic History    Marital status:    Tobacco Use    Smoking status: Every Day     Current packs/day: 0.50     Types: Cigarettes    Smokeless tobacco: Never   Vaping Use    Vaping status: Never Used   Substance and Sexual Activity    Alcohol use: Not Currently    Drug use: Not Currently    Sexual activity: Defer         ALLERGIES  Ibuprofen        REVIEW OF SYSTEMS  Systems reviewed and negative      PHYSICAL EXAM    I have reviewed the triage vital signs and nursing notes.    ED Triage Vitals [24 2309]   Temp Heart Rate Resp BP SpO2   97.9 °F (36.6 °C) 76 18 148/89 100 %      Temp src Heart Rate Source Patient Position BP Location FiO2 (%)   Oral Monitor Sitting Left arm --       Physical Exam  Constitutional:       General: He is not in acute  distress.  Neck:      Comments: Left paraspinal tenderness palpation in the lower cervical spine  Cardiovascular:      Rate and Rhythm: Normal rate and regular rhythm.      Pulses: Normal pulses.   Pulmonary:      Effort: Pulmonary effort is normal. No respiratory distress.   Chest:      Chest wall: No tenderness.   Musculoskeletal:      Cervical back: Neck supple.      Comments: Tenderness about the left trapezius muscle no overlying skin findings.  Tolerates full range of motion of the left shoulder as well as elbow flexion/extension.  Good  strength of the left upper extremity.  Sensation is intact with reported tingling mostly in the median nerve distribution.   Neurological:      Mental Status: He is alert and oriented to person, place, and time.      Cranial Nerves: No cranial nerve deficit.         LAB RESULTS  No results found for this or any previous visit (from the past 24 hour(s)).    If labs were ordered, I independently reviewed the results and considered them in treating the patient.        RADIOLOGY  No Radiology Exams Resulted Within Past 24 Hours    PROCEDURES    Procedures    No orders to display       MEDICATIONS GIVEN IN ER    Medications   ketorolac (TORADOL) injection 15 mg (has no administration in time range)         MEDICAL DECISION MAKING, PROGRESS, and CONSULTS    All labs, if obtained, have been independently reviewed by me.  All radiology studies, if obtained, have been reviewed by me and the radiologist dictating the report.  All EKG's, if obtained, have been independently viewed and interpreted by me.      Discussion below represents my analysis of pertinent findings related to patient's condition, differential diagnosis, treatment plan and final disposition.                         Differential diagnosis:    Neck pain, strain, radiculopathy, others.      Additional sources:    - Discussed/ obtained information from independent historians:      - External (non-ED) record review:  Outpatient visit 2/23/2024 from [facility]    - Chronic or social conditions impacting care:      - Shared decision making:        Orders placed during this visit:  No orders of the defined types were placed in this encounter.        Additional orders considered but not ordered:      ED Course:    Patient is a 38-year-old male presenting with left-sided neck pain.  He also reported some tingling down the left shoulder and arm.  This is suggestive of cervical radiculopathy.  No red flags on examination no numbness no weakness of the affected extremity.  Pain is paraspinal with no midline tenderness.  He is afebrile.  No lesions.  No chest pain to suggest ACS.  Previous history of opioid use disorder denies IV drug use.  Considered imaging however felt low utility given no focal neurological deficits and no midline tenderness.  Will treat with short course of oral steroid, multimodal nonopioid analgesia as well as prompt outpatient follow-up.  Return precautions instructed                Consultants:      Shared Decision Making:  After my consideration of clinical presentation and any laboratory/radiology studies obtained, I discussed the findings with the patient/patient representative who is in agreement with the treatment plan and the final disposition.   Risks and benefits of discharge and/or observation/admission were discussed.         AS OF 01:00 EDT VITALS:    BP - 148/89  HR - 76  TEMP - 97.9 °F (36.6 °C) (Oral)  O2 SATS - 100%                  DIAGNOSIS  Final diagnoses:   Cervical radiculopathy         DISPOSITION  ED Disposition       ED Disposition   Discharge    Condition   Stable    Comment   --                   Please note that portions of this document were completed with voice recognition software.        Micky Suarez MD  06/01/24 0700

## 2024-05-27 NOTE — DISCHARGE INSTRUCTIONS
Take steroid as prescribed to help with pain  You may supplement with other over the counter pain medicines as needed